# Patient Record
Sex: FEMALE | Race: WHITE | Employment: UNEMPLOYED | URBAN - METROPOLITAN AREA
[De-identification: names, ages, dates, MRNs, and addresses within clinical notes are randomized per-mention and may not be internally consistent; named-entity substitution may affect disease eponyms.]

---

## 2018-03-28 ENCOUNTER — HOSPITAL ENCOUNTER (OUTPATIENT)
Dept: PREADMISSION TESTING | Age: 58
Discharge: HOME | DRG: 454 | End: 2018-03-28
Payer: MEDICARE

## 2018-03-28 ENCOUNTER — ANESTHESIA EVENT (OUTPATIENT)
Dept: SURGERY | Age: 58
DRG: 454 | End: 2018-03-28
Payer: MEDICARE

## 2018-03-28 LAB
ALBUMIN SERPL-MCNC: 3.9 G/DL (ref 3.4–5)
ALBUMIN/GLOB SERPL: 1.6 {RATIO} (ref 0.8–1.7)
ALP SERPL-CCNC: 129 U/L (ref 45–117)
ALT SERPL-CCNC: 20 U/L (ref 13–56)
ANION GAP SERPL CALC-SCNC: 4 MMOL/L (ref 3–18)
APPEARANCE UR: CLEAR
APTT PPP: 31.5 SEC (ref 23–36.4)
AST SERPL-CCNC: 14 U/L (ref 15–37)
BACTERIA URNS QL MICRO: NEGATIVE /HPF
BASOPHILS # BLD: 0 K/UL (ref 0–0.06)
BASOPHILS NFR BLD: 0 % (ref 0–2)
BILIRUB SERPL-MCNC: 0.2 MG/DL (ref 0.2–1)
BILIRUB UR QL: NEGATIVE
BUN SERPL-MCNC: 16 MG/DL (ref 7–18)
BUN/CREAT SERPL: 24 (ref 12–20)
CALCIUM SERPL-MCNC: 9.3 MG/DL (ref 8.5–10.1)
CAOX CRY URNS QL MICRO: ABNORMAL
CHLORIDE SERPL-SCNC: 104 MMOL/L (ref 100–108)
CO2 SERPL-SCNC: 32 MMOL/L (ref 21–32)
COLOR UR: ABNORMAL
CREAT SERPL-MCNC: 0.67 MG/DL (ref 0.6–1.3)
DIFFERENTIAL METHOD BLD: ABNORMAL
EOSINOPHIL # BLD: 0.4 K/UL (ref 0–0.4)
EOSINOPHIL NFR BLD: 7 % (ref 0–5)
EPITH CASTS URNS QL MICRO: ABNORMAL /LPF (ref 0–5)
ERYTHROCYTE [DISTWIDTH] IN BLOOD BY AUTOMATED COUNT: 16.2 % (ref 11.6–14.5)
GLOBULIN SER CALC-MCNC: 2.5 G/DL (ref 2–4)
GLUCOSE SERPL-MCNC: 78 MG/DL (ref 74–99)
GLUCOSE UR STRIP.AUTO-MCNC: NEGATIVE MG/DL
HCT VFR BLD AUTO: 35.4 % (ref 35–45)
HGB BLD-MCNC: 10.9 G/DL (ref 12–16)
HGB UR QL STRIP: NEGATIVE
INR PPP: 1 (ref 0.8–1.2)
KETONES UR QL STRIP.AUTO: ABNORMAL MG/DL
LEUKOCYTE ESTERASE UR QL STRIP.AUTO: ABNORMAL
LYMPHOCYTES # BLD: 1.7 K/UL (ref 0.9–3.6)
LYMPHOCYTES NFR BLD: 28 % (ref 21–52)
MCH RBC QN AUTO: 27.6 PG (ref 24–34)
MCHC RBC AUTO-ENTMCNC: 30.8 G/DL (ref 31–37)
MCV RBC AUTO: 89.6 FL (ref 74–97)
MONOCYTES # BLD: 0.4 K/UL (ref 0.05–1.2)
MONOCYTES NFR BLD: 8 % (ref 3–10)
NEUTS SEG # BLD: 3.3 K/UL (ref 1.8–8)
NEUTS SEG NFR BLD: 57 % (ref 40–73)
NITRITE UR QL STRIP.AUTO: NEGATIVE
PH UR STRIP: 5 [PH] (ref 5–8)
PLATELET # BLD AUTO: 327 K/UL (ref 135–420)
PMV BLD AUTO: 9 FL (ref 9.2–11.8)
POTASSIUM SERPL-SCNC: 3.9 MMOL/L (ref 3.5–5.5)
PROT SERPL-MCNC: 6.4 G/DL (ref 6.4–8.2)
PROT UR STRIP-MCNC: NEGATIVE MG/DL
PROTHROMBIN TIME: 12.7 SEC (ref 11.5–15.2)
RBC # BLD AUTO: 3.95 M/UL (ref 4.2–5.3)
RBC #/AREA URNS HPF: ABNORMAL /HPF (ref 0–5)
SODIUM SERPL-SCNC: 140 MMOL/L (ref 136–145)
SP GR UR REFRACTOMETRY: >1.03 (ref 1–1.03)
UROBILINOGEN UR QL STRIP.AUTO: 1 EU/DL (ref 0.2–1)
WBC # BLD AUTO: 5.8 K/UL (ref 4.6–13.2)
WBC URNS QL MICRO: ABNORMAL /HPF (ref 0–4)

## 2018-03-28 PROCEDURE — 85730 THROMBOPLASTIN TIME PARTIAL: CPT | Performed by: NEUROLOGICAL SURGERY

## 2018-03-28 PROCEDURE — 87086 URINE CULTURE/COLONY COUNT: CPT | Performed by: NEUROLOGICAL SURGERY

## 2018-03-28 PROCEDURE — 87077 CULTURE AEROBIC IDENTIFY: CPT | Performed by: NEUROLOGICAL SURGERY

## 2018-03-28 PROCEDURE — 86901 BLOOD TYPING SEROLOGIC RH(D): CPT | Performed by: NEUROLOGICAL SURGERY

## 2018-03-28 PROCEDURE — 81001 URINALYSIS AUTO W/SCOPE: CPT | Performed by: NEUROLOGICAL SURGERY

## 2018-03-28 PROCEDURE — 85025 COMPLETE CBC W/AUTO DIFF WBC: CPT | Performed by: NEUROLOGICAL SURGERY

## 2018-03-28 PROCEDURE — 87186 SC STD MICRODIL/AGAR DIL: CPT | Performed by: NEUROLOGICAL SURGERY

## 2018-03-28 PROCEDURE — 80053 COMPREHEN METABOLIC PANEL: CPT | Performed by: NEUROLOGICAL SURGERY

## 2018-03-28 PROCEDURE — 86920 COMPATIBILITY TEST SPIN: CPT | Performed by: NEUROLOGICAL SURGERY

## 2018-03-28 PROCEDURE — 85610 PROTHROMBIN TIME: CPT | Performed by: NEUROLOGICAL SURGERY

## 2018-03-28 PROCEDURE — 36415 COLL VENOUS BLD VENIPUNCTURE: CPT | Performed by: NEUROLOGICAL SURGERY

## 2018-03-28 NOTE — PERIOP NOTES
PAT - SURGICAL PRE-ADMISSION INSTRUCTIONS    NAME:  Tramaine Lewis                                                          TODAY'S DATE:  3/28/2018    SURGERY DATE:  3/29/2018                                  SURGERY ARRIVAL TIME:   0630    1. Do NOT eat or drink anything, including candy or gum, after MIDNIGHT on 03/28/18 , unless you have specific instructions from your Surgeon or Anesthesia Provider to do so. 2. No smoking on the day of surgery. 3. No alcohol 24 hours prior to the day of surgery. 4. No recreational drugs for one week prior to the day of surgery. 5. Leave all valuables, including money/purse, at home. 6. Remove all jewelry, nail polish, makeup (including mascara); no lotions, powders, deodorant, or perfume/cologne/after shave. 7. Glasses/Contact lenses and Dentures may be worn to the hospital.  They will be removed prior to surgery. 8. Call your doctor if symptoms of a cold or illness develop within 24 ours prior to surgery. 9. AN ADULT MUST DRIVE YOU HOME AFTER OUTPATIENT SURGERY. 10. If you are having an OUTPATIENT procedure, please make arrangements for a responsible adult to be with you for 24 hours after your surgery. 11. If you are admitted to the hospital, you will be assigned to a bed after surgery is complete. Normally a family member will not be able to see you until you are in your assigned bed. 15. Family is encouraged to accompany you to the hospital.  We ask visitors in the treatment area to be limited to ONE person at a time to ensure patient privacy. EXCEPTIONS WILL BE MADE AS NEEDED. 15. Children under 12 are discouraged from entering the treatment area and need to be supervised by an adult when in the waiting room. Special Instructions: Take these medications the morning of surgery with a sip of water:  Toprol , Cymbalta, Oxycodone    Patient Prep:    use CHG solution    These surgical instructions were reviewed with Demi during the PAT visit.    A printed copy of the instructions was provided to [facility]. Directions: On the morning of surgery, please go to the [address] [facility]. Enter the building from the [facility] entrance, 1st floor (next to the Emergency Room entrance). Take the elevator to the 2nd floor. Sign in at the Registration Desk.     If you have any questions and/or concerns, please do not hesitate to call:  (Prior to the day of surgery)  [facility] unit:  [phone]  (Day of surgery)  [facility] unit:  [phone]

## 2018-03-29 ENCOUNTER — APPOINTMENT (OUTPATIENT)
Dept: GENERAL RADIOLOGY | Age: 58
DRG: 454 | End: 2018-03-29
Attending: NEUROLOGICAL SURGERY
Payer: MEDICARE

## 2018-03-29 ENCOUNTER — HOSPITAL ENCOUNTER (INPATIENT)
Age: 58
LOS: 12 days | Discharge: HOME HEALTH | DRG: 454 | End: 2018-04-10
Attending: NEUROLOGICAL SURGERY | Admitting: NEUROLOGICAL SURGERY
Payer: MEDICARE

## 2018-03-29 ENCOUNTER — ANESTHESIA (OUTPATIENT)
Dept: SURGERY | Age: 58
DRG: 454 | End: 2018-03-29
Payer: MEDICARE

## 2018-03-29 DIAGNOSIS — G89.29 CHRONIC MIDLINE LOW BACK PAIN WITH SCIATICA, SCIATICA LATERALITY UNSPECIFIED: ICD-10-CM

## 2018-03-29 DIAGNOSIS — M54.42 CHRONIC MIDLINE LOW BACK PAIN WITH BILATERAL SCIATICA: Primary | ICD-10-CM

## 2018-03-29 DIAGNOSIS — M51.37 DEGENERATION OF LUMBAR OR LUMBOSACRAL INTERVERTEBRAL DISC: ICD-10-CM

## 2018-03-29 DIAGNOSIS — M54.40 CHRONIC MIDLINE LOW BACK PAIN WITH SCIATICA, SCIATICA LATERALITY UNSPECIFIED: ICD-10-CM

## 2018-03-29 DIAGNOSIS — M54.41 CHRONIC MIDLINE LOW BACK PAIN WITH BILATERAL SCIATICA: Primary | ICD-10-CM

## 2018-03-29 DIAGNOSIS — G89.29 CHRONIC MIDLINE LOW BACK PAIN WITH BILATERAL SCIATICA: Primary | ICD-10-CM

## 2018-03-29 PROBLEM — M54.16 LUMBAR RADICULOPATHY: Status: ACTIVE | Noted: 2018-03-29

## 2018-03-29 PROBLEM — M54.14 RADICULOPATHY, THORACIC REGION: Status: ACTIVE | Noted: 2018-03-29

## 2018-03-29 PROBLEM — M40.30: Status: ACTIVE | Noted: 2018-03-29

## 2018-03-29 LAB
HCT VFR BLD AUTO: 25.8 % (ref 35–45)
HGB BLD-MCNC: 8.4 G/DL (ref 12–16)

## 2018-03-29 PROCEDURE — 77030004391 HC BUR FLUT MEDT -C: Performed by: NEUROLOGICAL SURGERY

## 2018-03-29 PROCEDURE — 74011250636 HC RX REV CODE- 250/636: Performed by: ANESTHESIOLOGY

## 2018-03-29 PROCEDURE — 0SH304Z INSERTION OF INTERNAL FIXATION DEVICE INTO LUMBOSACRAL JOINT, OPEN APPROACH: ICD-10-PCS | Performed by: NEUROLOGICAL SURGERY

## 2018-03-29 PROCEDURE — C1713 ANCHOR/SCREW BN/BN,TIS/BN: HCPCS | Performed by: NEUROLOGICAL SURGERY

## 2018-03-29 PROCEDURE — 77030035236 HC SUT PDS STRATFX BARB J&J -B: Performed by: NEUROLOGICAL SURGERY

## 2018-03-29 PROCEDURE — 77030003028 HC SUT VCRL J&J -A: Performed by: NEUROLOGICAL SURGERY

## 2018-03-29 PROCEDURE — 0SP004Z REMOVAL OF INTERNAL FIXATION DEVICE FROM LUMBAR VERTEBRAL JOINT, OPEN APPROACH: ICD-10-PCS | Performed by: NEUROLOGICAL SURGERY

## 2018-03-29 PROCEDURE — 72100 X-RAY EXAM L-S SPINE 2/3 VWS: CPT

## 2018-03-29 PROCEDURE — 77030031359 HC BLD BN MILL DISP STRY -E: Performed by: NEUROLOGICAL SURGERY

## 2018-03-29 PROCEDURE — 77030034694 HC SCPL CANADY PLSM DISP USMD -E: Performed by: NEUROLOGICAL SURGERY

## 2018-03-29 PROCEDURE — 74011250636 HC RX REV CODE- 250/636: Performed by: NURSE ANESTHETIST, CERTIFIED REGISTERED

## 2018-03-29 PROCEDURE — 77030003029 HC SUT VCRL J&J -B: Performed by: NEUROLOGICAL SURGERY

## 2018-03-29 PROCEDURE — 77030018719 HC DRSG PTCH ANTIMIC J&J -A: Performed by: NEUROLOGICAL SURGERY

## 2018-03-29 PROCEDURE — 77030002946 HC SUT NRLN J&J -B: Performed by: NEUROLOGICAL SURGERY

## 2018-03-29 PROCEDURE — 74011000272 HC RX REV CODE- 272: Performed by: NEUROLOGICAL SURGERY

## 2018-03-29 PROCEDURE — 77030018836 HC SOL IRR NACL ICUM -A: Performed by: NEUROLOGICAL SURGERY

## 2018-03-29 PROCEDURE — 0RPA04Z REMOVAL OF INTERNAL FIXATION DEVICE FROM THORACOLUMBAR VERTEBRAL JOINT, OPEN APPROACH: ICD-10-PCS | Performed by: NEUROLOGICAL SURGERY

## 2018-03-29 PROCEDURE — 74011250637 HC RX REV CODE- 250/637: Performed by: NEUROLOGICAL SURGERY

## 2018-03-29 PROCEDURE — 77030012406 HC DRN WND PENRS BARD -A: Performed by: NEUROLOGICAL SURGERY

## 2018-03-29 PROCEDURE — 77030011640 HC PAD GRND REM COVD -A: Performed by: NEUROLOGICAL SURGERY

## 2018-03-29 PROCEDURE — 77030002987 HC SUT PROL J&J -B: Performed by: NEUROLOGICAL SURGERY

## 2018-03-29 PROCEDURE — 0SG00KJ FUSION OF LUMBAR VERTEBRAL JOINT WITH NONAUTOLOGOUS TISSUE SUBSTITUTE, POSTERIOR APPROACH, ANTERIOR COLUMN, OPEN APPROACH: ICD-10-PCS | Performed by: NEUROLOGICAL SURGERY

## 2018-03-29 PROCEDURE — 0RH604Z INSERTION OF INTERNAL FIXATION DEVICE INTO THORACIC VERTEBRAL JOINT, OPEN APPROACH: ICD-10-PCS | Performed by: NEUROLOGICAL SURGERY

## 2018-03-29 PROCEDURE — P9016 RBC LEUKOCYTES REDUCED: HCPCS | Performed by: NEUROLOGICAL SURGERY

## 2018-03-29 PROCEDURE — 74011000250 HC RX REV CODE- 250: Performed by: NEUROLOGICAL SURGERY

## 2018-03-29 PROCEDURE — 77030029372 HC ADH SKN CLSR PRINEO J&J -C: Performed by: NEUROLOGICAL SURGERY

## 2018-03-29 PROCEDURE — 0SH004Z INSERTION OF INTERNAL FIXATION DEVICE INTO LUMBAR VERTEBRAL JOINT, OPEN APPROACH: ICD-10-PCS | Performed by: NEUROLOGICAL SURGERY

## 2018-03-29 PROCEDURE — 74011000250 HC RX REV CODE- 250

## 2018-03-29 PROCEDURE — 77030018846 HC SOL IRR STRL H20 ICUM -A: Performed by: NEUROLOGICAL SURGERY

## 2018-03-29 PROCEDURE — 0QH304Z INSERTION OF INTERNAL FIXATION DEVICE INTO LEFT PELVIC BONE, OPEN APPROACH: ICD-10-PCS | Performed by: NEUROLOGICAL SURGERY

## 2018-03-29 PROCEDURE — 01NB0ZZ RELEASE LUMBAR NERVE, OPEN APPROACH: ICD-10-PCS | Performed by: NEUROLOGICAL SURGERY

## 2018-03-29 PROCEDURE — 74011250636 HC RX REV CODE- 250/636

## 2018-03-29 PROCEDURE — 76060000046 HC ANESTHESIA 7.5 TO 8 HR: Performed by: NEUROLOGICAL SURGERY

## 2018-03-29 PROCEDURE — 77030003160 HC GRFT DURA COLGN MEDT -E: Performed by: NEUROLOGICAL SURGERY

## 2018-03-29 PROCEDURE — 77030005518 HC CATH URETH FOL 2W BARD -B: Performed by: NEUROLOGICAL SURGERY

## 2018-03-29 PROCEDURE — 77030033138 HC SUT PGA STRATFX J&J -B: Performed by: NEUROLOGICAL SURGERY

## 2018-03-29 PROCEDURE — 85018 HEMOGLOBIN: CPT | Performed by: NEUROLOGICAL SURGERY

## 2018-03-29 PROCEDURE — P9047 ALBUMIN (HUMAN), 25%, 50ML: HCPCS

## 2018-03-29 PROCEDURE — 00UT0KZ SUPPLEMENT SPINAL MENINGES WITH NONAUTOLOGOUS TISSUE SUBSTITUTE, OPEN APPROACH: ICD-10-PCS | Performed by: NEUROLOGICAL SURGERY

## 2018-03-29 PROCEDURE — 0QH204Z INSERTION OF INTERNAL FIXATION DEVICE INTO RIGHT PELVIC BONE, OPEN APPROACH: ICD-10-PCS | Performed by: NEUROLOGICAL SURGERY

## 2018-03-29 PROCEDURE — 0SG10K1 FUSION OF 2 OR MORE LUMBAR VERTEBRAL JOINTS WITH NONAUTOLOGOUS TISSUE SUBSTITUTE, POSTERIOR APPROACH, POSTERIOR COLUMN, OPEN APPROACH: ICD-10-PCS | Performed by: NEUROLOGICAL SURGERY

## 2018-03-29 PROCEDURE — 76010000182 HC OR TIME 7.5 TO 8 HR INTENSV-TIER 1: Performed by: NEUROLOGICAL SURGERY

## 2018-03-29 PROCEDURE — 77030032490 HC SLV COMPR SCD KNE COVD -B: Performed by: NEUROLOGICAL SURGERY

## 2018-03-29 PROCEDURE — 77030020271 HC MISC IMPL NEURO: Performed by: NEUROLOGICAL SURGERY

## 2018-03-29 PROCEDURE — 77030020256 HC SOL INJ NACL 0.9%  500ML

## 2018-03-29 PROCEDURE — 0RHA04Z INSERTION OF INTERNAL FIXATION DEVICE INTO THORACOLUMBAR VERTEBRAL JOINT, OPEN APPROACH: ICD-10-PCS | Performed by: NEUROLOGICAL SURGERY

## 2018-03-29 PROCEDURE — 74011000258 HC RX REV CODE- 258

## 2018-03-29 PROCEDURE — 0RP604Z REMOVAL OF INTERNAL FIXATION DEVICE FROM THORACIC VERTEBRAL JOINT, OPEN APPROACH: ICD-10-PCS | Performed by: NEUROLOGICAL SURGERY

## 2018-03-29 PROCEDURE — 77030037731 HC PTTY BN HEMOSRB ABRY -H: Performed by: NEUROLOGICAL SURGERY

## 2018-03-29 PROCEDURE — 76210000002 HC OR PH I REC 3 TO 3.5 HR: Performed by: NEUROLOGICAL SURGERY

## 2018-03-29 PROCEDURE — 77030012602 HC SPNG PTTY NEUR J&J -B: Performed by: NEUROLOGICAL SURGERY

## 2018-03-29 PROCEDURE — 77030014650 HC SEAL MTRX FLOSEL BAXT -C: Performed by: NEUROLOGICAL SURGERY

## 2018-03-29 PROCEDURE — 74011250637 HC RX REV CODE- 250/637: Performed by: NURSE ANESTHETIST, CERTIFIED REGISTERED

## 2018-03-29 PROCEDURE — 77030029716 HC SEAL SPN HEMSTAT DURASL KT INLC -F: Performed by: NEUROLOGICAL SURGERY

## 2018-03-29 PROCEDURE — 77030018723 HC ELCTRD BLD COVD -A: Performed by: NEUROLOGICAL SURGERY

## 2018-03-29 PROCEDURE — 36415 COLL VENOUS BLD VENIPUNCTURE: CPT | Performed by: NEUROLOGICAL SURGERY

## 2018-03-29 PROCEDURE — 65610000006 HC RM INTENSIVE CARE

## 2018-03-29 PROCEDURE — 77030018673: Performed by: NEUROLOGICAL SURGERY

## 2018-03-29 PROCEDURE — 77010033678 HC OXYGEN DAILY

## 2018-03-29 PROCEDURE — 74011250636 HC RX REV CODE- 250/636: Performed by: NEUROLOGICAL SURGERY

## 2018-03-29 PROCEDURE — 77030029099 HC BN WAX SSPC -A: Performed by: NEUROLOGICAL SURGERY

## 2018-03-29 PROCEDURE — 77030033263 HC DRSG MEPILEX 16-48IN BORD MOLN -B

## 2018-03-29 DEVICE — CREO® THREADED 7.5 X 45MM POLYAXIAL SCREW
Type: IMPLANTABLE DEVICE | Site: SPINE LUMBAR | Status: FUNCTIONAL
Brand: CREO

## 2018-03-29 DEVICE — THREADED LOCKING CAP, CREO
Type: IMPLANTABLE DEVICE | Site: SPINE LUMBAR | Status: FUNCTIONAL
Brand: CREO

## 2018-03-29 DEVICE — SCREW SPNL DIA5.5MM OPN TULIP LOK RELINE: Type: IMPLANTABLE DEVICE | Site: SPINE LUMBAR | Status: FUNCTIONAL

## 2018-03-29 DEVICE — SCREW SPNL POST THORACOLUMBOSACRAL LCK CLOSE TULIP RELINE: Type: IMPLANTABLE DEVICE | Site: SPINE LUMBAR | Status: FUNCTIONAL

## 2018-03-29 DEVICE — IMPLANTABLE DEVICE: Type: IMPLANTABLE DEVICE | Site: SPINE LUMBAR | Status: FUNCTIONAL

## 2018-03-29 DEVICE — 5.5MM CURVED ROD, TITANIUM ALLOY, 55MM LENGTH
Type: IMPLANTABLE DEVICE | Site: SPINE LUMBAR | Status: FUNCTIONAL
Brand: CREO

## 2018-03-29 DEVICE — CREO® THREADED 6.5 X 40MM POLYAXIAL SCREW
Type: IMPLANTABLE DEVICE | Site: SPINE LUMBAR | Status: FUNCTIONAL
Brand: CREO

## 2018-03-29 DEVICE — 5.5MM CURVED ROD, TITANIUM ALLOY, 85MM LENGTH
Type: IMPLANTABLE DEVICE | Site: SPINE LUMBAR | Status: FUNCTIONAL
Brand: CREO

## 2018-03-29 DEVICE — CREO® THREADED 6.5 X 50MM POLYAXIAL SCREW
Type: IMPLANTABLE DEVICE | Site: SPINE LUMBAR | Status: FUNCTIONAL
Brand: CREO

## 2018-03-29 DEVICE — GRAFT BONE SUB 5CC CA PHOS VIT E ACETT HEMSTAT COHESIVE: Type: IMPLANTABLE DEVICE | Site: SPINE LUMBAR | Status: FUNCTIONAL

## 2018-03-29 DEVICE — CREO® THREADED 5.5 X 45MM POLYAXIAL SCREW
Type: IMPLANTABLE DEVICE | Site: SPINE LUMBAR | Status: FUNCTIONAL
Brand: CREO

## 2018-03-29 DEVICE — 5.5MM CURVED ROD, TITANIUM ALLOY, 75MM LENGTH
Type: IMPLANTABLE DEVICE | Site: SPINE LUMBAR | Status: FUNCTIONAL
Brand: CREO

## 2018-03-29 DEVICE — CREO® THREADED 6.5 X 45MM POLYAXIAL SCREW
Type: IMPLANTABLE DEVICE | Site: SPINE LUMBAR | Status: FUNCTIONAL
Brand: CREO

## 2018-03-29 DEVICE — DURA 62100 SUBSTITUTE DUREPAIR 2X2IN NCE
Type: IMPLANTABLE DEVICE | Site: SPINE LUMBAR | Status: FUNCTIONAL
Brand: DUREPAIR®

## 2018-03-29 RX ORDER — LORAZEPAM 2 MG/ML
1 INJECTION INTRAMUSCULAR
Status: COMPLETED | OUTPATIENT
Start: 2018-03-29 | End: 2018-03-29

## 2018-03-29 RX ORDER — FAMOTIDINE 20 MG/1
20 TABLET, FILM COATED ORAL ONCE
Status: COMPLETED | OUTPATIENT
Start: 2018-03-29 | End: 2018-03-29

## 2018-03-29 RX ORDER — CYCLOBENZAPRINE HCL 10 MG
10 TABLET ORAL
Status: DISCONTINUED | OUTPATIENT
Start: 2018-03-29 | End: 2018-03-29 | Stop reason: SDUPTHER

## 2018-03-29 RX ORDER — ADHESIVE BANDAGE
30 BANDAGE TOPICAL DAILY
Status: DISCONTINUED | OUTPATIENT
Start: 2018-03-30 | End: 2018-04-10 | Stop reason: HOSPADM

## 2018-03-29 RX ORDER — MORPHINE SULFATE 10 MG/ML
INJECTION, SOLUTION INTRAMUSCULAR; INTRAVENOUS AS NEEDED
Status: DISCONTINUED | OUTPATIENT
Start: 2018-03-29 | End: 2018-03-29 | Stop reason: HOSPADM

## 2018-03-29 RX ORDER — SODIUM CHLORIDE 0.9 % (FLUSH) 0.9 %
5-10 SYRINGE (ML) INJECTION AS NEEDED
Status: DISCONTINUED | OUTPATIENT
Start: 2018-03-29 | End: 2018-04-10 | Stop reason: HOSPADM

## 2018-03-29 RX ORDER — LORAZEPAM 2 MG/ML
INJECTION INTRAMUSCULAR
Status: COMPLETED
Start: 2018-03-29 | End: 2018-03-29

## 2018-03-29 RX ORDER — OXYCODONE HYDROCHLORIDE 5 MG/1
20 TABLET ORAL
Status: CANCELLED | OUTPATIENT
Start: 2018-03-29

## 2018-03-29 RX ORDER — MORPHINE SULFATE 4 MG/ML
INJECTION, SOLUTION INTRAMUSCULAR; INTRAVENOUS
Status: COMPLETED
Start: 2018-03-29 | End: 2018-03-29

## 2018-03-29 RX ORDER — CYCLOBENZAPRINE HCL 10 MG
10 TABLET ORAL
Status: DISCONTINUED | OUTPATIENT
Start: 2018-03-29 | End: 2018-04-10 | Stop reason: HOSPADM

## 2018-03-29 RX ORDER — MORPHINE SULFATE 2 MG/ML
2 INJECTION, SOLUTION INTRAMUSCULAR; INTRAVENOUS
Status: DISCONTINUED | OUTPATIENT
Start: 2018-03-29 | End: 2018-03-29

## 2018-03-29 RX ORDER — ONDANSETRON 2 MG/ML
4 INJECTION INTRAMUSCULAR; INTRAVENOUS ONCE
Status: DISCONTINUED | OUTPATIENT
Start: 2018-03-29 | End: 2018-03-29 | Stop reason: HOSPADM

## 2018-03-29 RX ORDER — METOPROLOL SUCCINATE 25 MG/1
25 TABLET, EXTENDED RELEASE ORAL DAILY
Status: DISCONTINUED | OUTPATIENT
Start: 2018-03-30 | End: 2018-03-31

## 2018-03-29 RX ORDER — MORPHINE SULFATE 4 MG/ML
2 INJECTION, SOLUTION INTRAMUSCULAR; INTRAVENOUS
Status: DISCONTINUED | OUTPATIENT
Start: 2018-03-29 | End: 2018-03-29 | Stop reason: HOSPADM

## 2018-03-29 RX ORDER — VANCOMYCIN HYDROCHLORIDE 1 G/20ML
INJECTION, POWDER, LYOPHILIZED, FOR SOLUTION INTRAVENOUS AS NEEDED
Status: DISCONTINUED | OUTPATIENT
Start: 2018-03-29 | End: 2018-03-29 | Stop reason: HOSPADM

## 2018-03-29 RX ORDER — CLINDAMYCIN PHOSPHATE 900 MG/50ML
900 INJECTION INTRAVENOUS EVERY 8 HOURS
Status: COMPLETED | OUTPATIENT
Start: 2018-03-29 | End: 2018-04-06

## 2018-03-29 RX ORDER — DEXAMETHASONE SODIUM PHOSPHATE 4 MG/ML
INJECTION, SOLUTION INTRA-ARTICULAR; INTRALESIONAL; INTRAMUSCULAR; INTRAVENOUS; SOFT TISSUE
Status: COMPLETED
Start: 2018-03-29 | End: 2018-03-29

## 2018-03-29 RX ORDER — SODIUM CHLORIDE 0.9 % (FLUSH) 0.9 %
5-10 SYRINGE (ML) INJECTION EVERY 8 HOURS
Status: DISCONTINUED | OUTPATIENT
Start: 2018-03-29 | End: 2018-04-10 | Stop reason: HOSPADM

## 2018-03-29 RX ORDER — MORPHINE SULFATE 10 MG/ML
5 INJECTION, SOLUTION INTRAMUSCULAR; INTRAVENOUS
Status: CANCELLED | OUTPATIENT
Start: 2018-03-29

## 2018-03-29 RX ORDER — ONDANSETRON 2 MG/ML
4 INJECTION INTRAMUSCULAR; INTRAVENOUS
Status: DISCONTINUED | OUTPATIENT
Start: 2018-03-29 | End: 2018-04-10 | Stop reason: HOSPADM

## 2018-03-29 RX ORDER — SUCCINYLCHOLINE CHLORIDE 20 MG/ML
INJECTION INTRAMUSCULAR; INTRAVENOUS AS NEEDED
Status: DISCONTINUED | OUTPATIENT
Start: 2018-03-29 | End: 2018-03-29 | Stop reason: HOSPADM

## 2018-03-29 RX ORDER — FENTANYL CITRATE 50 UG/ML
INJECTION, SOLUTION INTRAMUSCULAR; INTRAVENOUS AS NEEDED
Status: DISCONTINUED | OUTPATIENT
Start: 2018-03-29 | End: 2018-03-29 | Stop reason: HOSPADM

## 2018-03-29 RX ORDER — PROPOFOL 10 MG/ML
VIAL (ML) INTRAVENOUS
Status: DISCONTINUED | OUTPATIENT
Start: 2018-03-29 | End: 2018-03-29 | Stop reason: HOSPADM

## 2018-03-29 RX ORDER — CEFAZOLIN SODIUM 2 G/50ML
SOLUTION INTRAVENOUS
Status: DISCONTINUED
Start: 2018-03-29 | End: 2018-03-29

## 2018-03-29 RX ORDER — CLINDAMYCIN PHOSPHATE 900 MG/50ML
900 INJECTION INTRAVENOUS ONCE
Status: COMPLETED | OUTPATIENT
Start: 2018-03-29 | End: 2018-03-29

## 2018-03-29 RX ORDER — FENTANYL CITRATE 50 UG/ML
50 INJECTION, SOLUTION INTRAMUSCULAR; INTRAVENOUS AS NEEDED
Status: COMPLETED | OUTPATIENT
Start: 2018-03-29 | End: 2018-03-29

## 2018-03-29 RX ORDER — DEXAMETHASONE SODIUM PHOSPHATE 4 MG/ML
4 INJECTION, SOLUTION INTRA-ARTICULAR; INTRALESIONAL; INTRAMUSCULAR; INTRAVENOUS; SOFT TISSUE EVERY 8 HOURS
Status: COMPLETED | OUTPATIENT
Start: 2018-03-29 | End: 2018-03-30

## 2018-03-29 RX ORDER — ACETAMINOPHEN 325 MG/1
650 TABLET ORAL
Status: DISCONTINUED | OUTPATIENT
Start: 2018-03-29 | End: 2018-04-10 | Stop reason: HOSPADM

## 2018-03-29 RX ORDER — MIDAZOLAM HYDROCHLORIDE 1 MG/ML
INJECTION, SOLUTION INTRAMUSCULAR; INTRAVENOUS AS NEEDED
Status: DISCONTINUED | OUTPATIENT
Start: 2018-03-29 | End: 2018-03-29 | Stop reason: HOSPADM

## 2018-03-29 RX ORDER — SODIUM CHLORIDE 9 MG/ML
INJECTION, SOLUTION INTRAVENOUS
Status: DISCONTINUED | OUTPATIENT
Start: 2018-03-29 | End: 2018-03-29 | Stop reason: HOSPADM

## 2018-03-29 RX ORDER — SODIUM CHLORIDE, SODIUM LACTATE, POTASSIUM CHLORIDE, CALCIUM CHLORIDE 600; 310; 30; 20 MG/100ML; MG/100ML; MG/100ML; MG/100ML
25 INJECTION, SOLUTION INTRAVENOUS CONTINUOUS
Status: DISCONTINUED | OUTPATIENT
Start: 2018-03-29 | End: 2018-03-29 | Stop reason: HOSPADM

## 2018-03-29 RX ORDER — DEXAMETHASONE SODIUM PHOSPHATE 4 MG/ML
INJECTION, SOLUTION INTRA-ARTICULAR; INTRALESIONAL; INTRAMUSCULAR; INTRAVENOUS; SOFT TISSUE AS NEEDED
Status: DISCONTINUED | OUTPATIENT
Start: 2018-03-29 | End: 2018-03-29 | Stop reason: HOSPADM

## 2018-03-29 RX ORDER — CLINDAMYCIN PHOSPHATE 900 MG/50ML
INJECTION INTRAVENOUS
Status: COMPLETED
Start: 2018-03-29 | End: 2018-03-29

## 2018-03-29 RX ORDER — SODIUM CHLORIDE, SODIUM LACTATE, POTASSIUM CHLORIDE, CALCIUM CHLORIDE 600; 310; 30; 20 MG/100ML; MG/100ML; MG/100ML; MG/100ML
INJECTION, SOLUTION INTRAVENOUS
Status: DISCONTINUED | OUTPATIENT
Start: 2018-03-29 | End: 2018-03-29 | Stop reason: HOSPADM

## 2018-03-29 RX ORDER — PROPOFOL 10 MG/ML
INJECTION, EMULSION INTRAVENOUS AS NEEDED
Status: DISCONTINUED | OUTPATIENT
Start: 2018-03-29 | End: 2018-03-29 | Stop reason: HOSPADM

## 2018-03-29 RX ORDER — SODIUM CHLORIDE 0.9 % (FLUSH) 0.9 %
5-10 SYRINGE (ML) INJECTION AS NEEDED
Status: DISCONTINUED | OUTPATIENT
Start: 2018-03-29 | End: 2018-03-29 | Stop reason: HOSPADM

## 2018-03-29 RX ORDER — DULOXETIN HYDROCHLORIDE 60 MG/1
60 CAPSULE, DELAYED RELEASE ORAL 2 TIMES DAILY
Status: DISCONTINUED | OUTPATIENT
Start: 2018-03-29 | End: 2018-04-10 | Stop reason: HOSPADM

## 2018-03-29 RX ORDER — ALBUMIN HUMAN 250 G/1000ML
SOLUTION INTRAVENOUS AS NEEDED
Status: DISCONTINUED | OUTPATIENT
Start: 2018-03-29 | End: 2018-03-29 | Stop reason: HOSPADM

## 2018-03-29 RX ORDER — LIDOCAINE HYDROCHLORIDE 10 MG/ML
0.1 INJECTION INFILTRATION; PERINEURAL AS NEEDED
Status: DISCONTINUED | OUTPATIENT
Start: 2018-03-29 | End: 2018-03-29 | Stop reason: HOSPADM

## 2018-03-29 RX ORDER — LIDOCAINE HYDROCHLORIDE 20 MG/ML
INJECTION, SOLUTION EPIDURAL; INFILTRATION; INTRACAUDAL; PERINEURAL AS NEEDED
Status: DISCONTINUED | OUTPATIENT
Start: 2018-03-29 | End: 2018-03-29 | Stop reason: HOSPADM

## 2018-03-29 RX ORDER — SODIUM CHLORIDE 9 MG/ML
250 INJECTION, SOLUTION INTRAVENOUS AS NEEDED
Status: DISCONTINUED | OUTPATIENT
Start: 2018-03-29 | End: 2018-04-10 | Stop reason: HOSPADM

## 2018-03-29 RX ORDER — REMIFENTANIL HYDROCHLORIDE 1 MG/ML
INJECTION, POWDER, LYOPHILIZED, FOR SOLUTION INTRAVENOUS
Status: DISCONTINUED | OUTPATIENT
Start: 2018-03-29 | End: 2018-03-29 | Stop reason: HOSPADM

## 2018-03-29 RX ORDER — ONDANSETRON 2 MG/ML
INJECTION INTRAMUSCULAR; INTRAVENOUS AS NEEDED
Status: DISCONTINUED | OUTPATIENT
Start: 2018-03-29 | End: 2018-03-29 | Stop reason: HOSPADM

## 2018-03-29 RX ORDER — SODIUM CHLORIDE, SODIUM LACTATE, POTASSIUM CHLORIDE, CALCIUM CHLORIDE 600; 310; 30; 20 MG/100ML; MG/100ML; MG/100ML; MG/100ML
75 INJECTION, SOLUTION INTRAVENOUS CONTINUOUS
Status: DISPENSED | OUTPATIENT
Start: 2018-03-29 | End: 2018-03-29

## 2018-03-29 RX ORDER — NALOXONE HYDROCHLORIDE 0.4 MG/ML
0.4 INJECTION, SOLUTION INTRAMUSCULAR; INTRAVENOUS; SUBCUTANEOUS AS NEEDED
Status: DISCONTINUED | OUTPATIENT
Start: 2018-03-29 | End: 2018-04-10 | Stop reason: HOSPADM

## 2018-03-29 RX ADMIN — CLINDAMYCIN PHOSPHATE 900 MG: 18 INJECTION, SOLUTION INTRAMUSCULAR; INTRAVENOUS at 22:10

## 2018-03-29 RX ADMIN — Medication 5 MCG/KG/MIN: at 09:25

## 2018-03-29 RX ADMIN — LORAZEPAM 1 MG: 2 INJECTION INTRAMUSCULAR; INTRAVENOUS at 17:20

## 2018-03-29 RX ADMIN — HYDROMORPHONE HYDROCHLORIDE: 10 INJECTION, SOLUTION INTRAMUSCULAR; INTRAVENOUS; SUBCUTANEOUS at 17:34

## 2018-03-29 RX ADMIN — PROPOFOL 150 MG: 10 INJECTION, EMULSION INTRAVENOUS at 08:46

## 2018-03-29 RX ADMIN — DEXAMETHASONE SODIUM PHOSPHATE 4 MG: 4 INJECTION, SOLUTION INTRAMUSCULAR; INTRAVENOUS at 20:59

## 2018-03-29 RX ADMIN — FENTANYL CITRATE 25 MCG: 50 INJECTION, SOLUTION INTRAMUSCULAR; INTRAVENOUS at 15:57

## 2018-03-29 RX ADMIN — MORPHINE SULFATE 2 MG: 4 INJECTION, SOLUTION INTRAMUSCULAR; INTRAVENOUS at 17:23

## 2018-03-29 RX ADMIN — SODIUM CHLORIDE: 9 INJECTION, SOLUTION INTRAVENOUS at 15:09

## 2018-03-29 RX ADMIN — MORPHINE SULFATE 3 MG: 10 INJECTION, SOLUTION INTRAMUSCULAR; INTRAVENOUS at 12:49

## 2018-03-29 RX ADMIN — SODIUM CHLORIDE, SODIUM LACTATE, POTASSIUM CHLORIDE, AND CALCIUM CHLORIDE: 600; 310; 30; 20 INJECTION, SOLUTION INTRAVENOUS at 09:20

## 2018-03-29 RX ADMIN — ALBUMIN HUMAN 250 ML: 250 SOLUTION INTRAVENOUS at 11:21

## 2018-03-29 RX ADMIN — SODIUM CHLORIDE, SODIUM LACTATE, POTASSIUM CHLORIDE, AND CALCIUM CHLORIDE 25 ML/HR: 600; 310; 30; 20 INJECTION, SOLUTION INTRAVENOUS at 07:26

## 2018-03-29 RX ADMIN — FENTANYL CITRATE 50 MCG: 50 INJECTION, SOLUTION INTRAMUSCULAR; INTRAVENOUS at 16:47

## 2018-03-29 RX ADMIN — CLINDAMYCIN PHOSPHATE 900 MG: 900 INJECTION INTRAVENOUS at 08:42

## 2018-03-29 RX ADMIN — SODIUM CHLORIDE, SODIUM LACTATE, POTASSIUM CHLORIDE, CALCIUM CHLORIDE: 600; 310; 30; 20 INJECTION, SOLUTION INTRAVENOUS at 14:06

## 2018-03-29 RX ADMIN — MORPHINE SULFATE 2 MG: 4 INJECTION, SOLUTION INTRAMUSCULAR; INTRAVENOUS at 17:13

## 2018-03-29 RX ADMIN — SODIUM CHLORIDE, SODIUM LACTATE, POTASSIUM CHLORIDE, CALCIUM CHLORIDE: 600; 310; 30; 20 INJECTION, SOLUTION INTRAVENOUS at 11:12

## 2018-03-29 RX ADMIN — LORAZEPAM 1 MG: 2 INJECTION INTRAMUSCULAR; INTRAVENOUS at 17:35

## 2018-03-29 RX ADMIN — FENTANYL CITRATE 100 MCG: 50 INJECTION, SOLUTION INTRAMUSCULAR; INTRAVENOUS at 08:46

## 2018-03-29 RX ADMIN — ALBUMIN HUMAN 250 ML: 250 SOLUTION INTRAVENOUS at 13:54

## 2018-03-29 RX ADMIN — Medication 10 ML: at 21:05

## 2018-03-29 RX ADMIN — SUCCINYLCHOLINE CHLORIDE 100 MG: 20 INJECTION INTRAMUSCULAR; INTRAVENOUS at 08:46

## 2018-03-29 RX ADMIN — SODIUM CHLORIDE, SODIUM LACTATE, POTASSIUM CHLORIDE, CALCIUM CHLORIDE: 600; 310; 30; 20 INJECTION, SOLUTION INTRAVENOUS at 08:50

## 2018-03-29 RX ADMIN — MORPHINE SULFATE 4 MG: 10 INJECTION, SOLUTION INTRAMUSCULAR; INTRAVENOUS at 10:02

## 2018-03-29 RX ADMIN — MORPHINE SULFATE 2 MG: 4 INJECTION, SOLUTION INTRAMUSCULAR; INTRAVENOUS at 17:03

## 2018-03-29 RX ADMIN — DEXAMETHASONE SODIUM PHOSPHATE 10 MG: 4 INJECTION, SOLUTION INTRA-ARTICULAR; INTRALESIONAL; INTRAMUSCULAR; INTRAVENOUS; SOFT TISSUE at 08:48

## 2018-03-29 RX ADMIN — DEXAMETHASONE SODIUM PHOSPHATE 10 MG: 4 INJECTION, SOLUTION INTRA-ARTICULAR; INTRALESIONAL; INTRAMUSCULAR; INTRAVENOUS; SOFT TISSUE at 15:31

## 2018-03-29 RX ADMIN — MORPHINE SULFATE 2 MG: 4 INJECTION, SOLUTION INTRAMUSCULAR; INTRAVENOUS at 16:53

## 2018-03-29 RX ADMIN — Medication 10 ML: at 21:06

## 2018-03-29 RX ADMIN — MIDAZOLAM HYDROCHLORIDE 2 MG: 1 INJECTION, SOLUTION INTRAMUSCULAR; INTRAVENOUS at 08:42

## 2018-03-29 RX ADMIN — FENTANYL CITRATE 25 MCG: 50 INJECTION, SOLUTION INTRAMUSCULAR; INTRAVENOUS at 16:02

## 2018-03-29 RX ADMIN — FENTANYL CITRATE 50 MCG: 50 INJECTION, SOLUTION INTRAMUSCULAR; INTRAVENOUS at 16:42

## 2018-03-29 RX ADMIN — MORPHINE SULFATE 3 MG: 10 INJECTION, SOLUTION INTRAMUSCULAR; INTRAVENOUS at 09:39

## 2018-03-29 RX ADMIN — ONDANSETRON 4 MG: 2 INJECTION INTRAMUSCULAR; INTRAVENOUS at 15:31

## 2018-03-29 RX ADMIN — LIDOCAINE HYDROCHLORIDE 50 MG: 20 INJECTION, SOLUTION EPIDURAL; INFILTRATION; INTRACAUDAL; PERINEURAL at 08:46

## 2018-03-29 RX ADMIN — DULOXETINE HYDROCHLORIDE 60 MG: 60 CAPSULE, DELAYED RELEASE ORAL at 20:58

## 2018-03-29 RX ADMIN — SODIUM CHLORIDE: 9 INJECTION, SOLUTION INTRAVENOUS at 12:12

## 2018-03-29 RX ADMIN — CYCLOBENZAPRINE HYDROCHLORIDE 10 MG: 10 TABLET, FILM COATED ORAL at 20:58

## 2018-03-29 RX ADMIN — FAMOTIDINE 20 MG: 20 TABLET ORAL at 07:15

## 2018-03-29 RX ADMIN — SODIUM CHLORIDE, SODIUM LACTATE, POTASSIUM CHLORIDE, AND CALCIUM CHLORIDE: 600; 310; 30; 20 INJECTION, SOLUTION INTRAVENOUS at 14:26

## 2018-03-29 RX ADMIN — FENTANYL CITRATE 50 MCG: 50 INJECTION, SOLUTION INTRAMUSCULAR; INTRAVENOUS at 16:07

## 2018-03-29 RX ADMIN — REMIFENTANIL HYDROCHLORIDE 0.03 MCG/KG/MIN: 1 INJECTION, POWDER, LYOPHILIZED, FOR SOLUTION INTRAVENOUS at 09:25

## 2018-03-29 NOTE — BRIEF OP NOTE
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  Postoperative Diagnosis: thoracic lumbar radiculopathy   m54.16  m54.14  -flat back syndrome junctional failure  Procedure(s):  exposure of previous t10 - l3 fusion/ removal of hardware l3 pedicle subtraction osteotomy  instrumentation ilium tto t10, bilateral duralrepair,  Surgeon(s) and Role:     * Mena Heredia MD - Assisting     * Stevie Sunshine MD - Primary         Assistant Staff: None      Surgical Staff:  Circ-1: Alexia Dias RN  Circ-2: Ross Troo RN  Circ-Relief: Britni Guevara RN  Scrub Tech-1: Johanna Neely; Gabriel Pryor  Scrub Tech-Relief: Sheri Sullivan  Surg Asst-1: Pushpa Leone  Event Time In   Incision Start 0278   Incision Close      Anesthesia: General   Estimated Blood Loss: 1145  Specimens: * No specimens in log *   Findings: flatten spine   Complications: none  Implants:   Implant Name Type Inv.  Item Serial No.  Lot No. LRB No. Used Action   [employer] Graft Bone   [id number] [employer] N/A N/A 1 Implanted   [employer] Graft Bone   [id number] [employer] N/A N/A 1 Implanted   [employer] Graft Bone   [id number] [employer] N/A N/A 1 Implanted   [employer] Graft Bone   [id number]  N/A N/A 1 Implanted   PUTTY BNE HEMOSTAT RESORB 5CC -- MONTAGE - [id number]  PUTTY BNE HEMOSTAT RESORB 5CC -- MONTAGE [id number] [employer] [id number] N/A 1 Implanted   GRAFT DURA CLLGN DURPAIR 2X2IN --  - [id number]  GRAFT DURA CLLGN DURPAIR 2X2IN --   [employer] [id number] N/A 1 Implanted   PUTTY BNE HEMOSTAT RESORB 5CC -- MONTAGE - [id number]  PUTTY BNE HEMOSTAT RESORB 5CC -- MONTAGE [id number] [employer] [id number] N/A 1 Implanted   [place] Bone Graft Strip   N/A [employer] [id number] N/A 1 Implanted                1 Implanted   fluids: 5000 crystalloid, 500 albumin  375 autologous

## 2018-03-29 NOTE — PERIOP NOTES
PACU Summary  Patient arrived to PACU at   Bedside/Verbal report received from [address] CRNA and FREYA Uribe RN  Vitals:    03/29/18 1727 03/29/18 1735 03/29/18 1742 03/29/18 1757   BP: 113/79  114/57 109/77   Pulse: (!) 102 (!) 103 (!) 111 (!) 106   Resp: 16 19 16 17   Temp:       SpO2: 93% 94% 94% 99%   Weight:       Height:         Cardiac rhythm: Sinus Tachycardia     Lines and Drains  Peripheral Intravenous Line:    Chavira Cather:    Wound  Wound Spine (Active)   DRESSING STATUS Clean, dry, and intact 3/29/2018  5:00 PM   DRESSING TYPE Topical skin adhesive/glue 3/29/2018  5:00 PM   Incision site well approximated? Yes 3/29/2018  5:00 PM   Drainage Amount  Scant 3/29/2018  5:00 PM   Drainage Color Sanguinous 3/29/2018  5:00 PM   Wound Odor None 3/29/2018  5:00 PM   Number of days:0            Intake and Output    Intake/Output Summary (Last 24 hours) at 03/29/18 1831  Last data filed at 03/29/18 1811   Gross per 24 hour   Intake             5375 ml   Output             1750 ml   Net             3625 ml     Bedside report given to [employer], RN at [facility].         Anh Quintana RN

## 2018-03-29 NOTE — PERIOP NOTES
Dr. Tiara Cotto paged regarding pt's cont signs of severe pain despite Dilaudid PCA. According to pt's  and sister when pt was at [address] [facility] did not work. Dr. Harvey Night notified. Pt rests quietly but appears to have spasm and wakes up thrashing about. Will cont to monitor. Awaiting return call from Dr. Tiara Cotto. 1927  Dr. Tiara Cotto had not returned call despite 2 pages. Checked with neurovascular answering service and was informed Dr. Marylene Birkenhead was [place] on call for tonight. Awaiting Dr. Sofia Pino return call. Pt's  updated to same. Karlis Force Dr. Marylene Birkenhead returned my call. Given update on pt's pain control situation. Stated at this time he is not going to increase her pain medication. Will cont to monitor.

## 2018-03-29 NOTE — ANESTHESIA PREPROCEDURE EVALUATION
Anesthetic History     Increased risk of difficult airway          Review of Systems / Medical History  Patient summary reviewed and pertinent labs reviewed    Pulmonary  Within defined limits                 Neuro/Psych   Within defined limits           Cardiovascular    Hypertension: well controlled        Dysrhythmias       Exercise tolerance: >4 METS     GI/Hepatic/Renal  Within defined limits              Endo/Other        Arthritis     Other Findings   Comments: Documentation of current medication  Current medications obtained, documented and obtained? YES      Risk Factors for Postoperative nausea/vomiting:       History of postoperative nausea/vomiting? NO       Female? YES       Motion sickness? NO       Intended opioid administration for postoperative analgesia? YES      Smoking Abstinence:  Current Smoker? NO  Elective Surgery? YES  Seen preoperatively by anesthesiologist or proxy prior to day of surgery? YES  Pt abstained from smoking 24 hours prior to anesthesia?  N/A    Preventive care/screening for High Blood Pressure:  Aged 18 years and older: YES  Screened for high blood pressure: YES  Patients with high blood pressure referred to primary care provider   for BP management: YES                 Physical Exam    Airway  Mallampati: III  TM Distance: 4 - 6 cm  Neck ROM: decreased range of motion   Mouth opening: Normal     Cardiovascular    Rhythm: regular  Rate: normal         Dental  No notable dental hx       Pulmonary  Breath sounds clear to auscultation               Abdominal  GI exam deferred       Other Findings            Anesthetic Plan    ASA: 2  Anesthesia type: general          Induction: Intravenous  Anesthetic plan and risks discussed with: Patient

## 2018-03-29 NOTE — INTERVAL H&P NOTE
H&P Update:  Byron Anderson was seen and examined. History and physical has been reviewed. The patient has been examined.  There have been no significant clinical changes since the completion of the originally dated History and Physical.    Signed By: Ventura Giles MD     March 29, 2018 7:46 AM

## 2018-03-29 NOTE — PERIOP NOTES
ORALIAB for Ancef from Dr. Abran Pina. N Allergy.   [facility] Hermelinda Mathew

## 2018-03-29 NOTE — IP AVS SNAPSHOT
Cindy Lane 
 
 
 [address] [place] [address] [id number] [place] [address] Patient: Ross Hollins MRN: [id number] About your hospitalization You were admitted on:  March 29, 2018 You last received care in the:  [address] [place] [address],[address] You were discharged on:  April 10, 2018 Why you were hospitalized Your primary diagnosis was:  Lumbar Radiculopathy Your diagnoses also included:  Radiculopathy, Thoracic Region, Acquired Flat Back Syndrome Follow-up Information Follow up With Details Comments Contact Info [address] [facility] ([facility])   [address] [place], [place] [address] [phone] Slava Leach MD Schedule an appointment as soon as possible for a visit As needed [address] [place] [address] [phone] Shira Lu MD Schedule an appointment as soon as possible for a visit in 2 weeks  915 [facility] [address] [phone] Discharge Orders None A check rosendo indicates which time of day the medication should be taken. My Medications START taking these medications Instructions Each Dose to Equal  
 Morning Noon Evening Bedtime  
 gabapentin 400 mg capsule Commonly known as:  NEURONTIN Your last dose was: Your next dose is: Take 2 Caps by mouth three (3) times daily. 800 mg HYDROmorphone 8 mg tablet Commonly known as:  DILAUDID Your last dose was: Your next dose is: Take 1 Tab by mouth every eight (8) hours as needed. Max Daily Amount: 24 mg.  
 8 mg  
    
   
   
   
  
 naloxone 0.4 mg/mL injection Commonly known as:  [employer] Your last dose was: Your next dose is:     
   
   
 Take 1 mL by inhalation as needed for Other (Give if breaths per minute are less than 10, may repeat dose in 15 min and contact MD).  
 0.4 mg  
    
   
   
   
  
  
 CHANGE how you take these medications Instructions Each Dose to Equal  
 Morning Noon Evening Bedtime  
 cyclobenzaprine 10 mg tablet Commonly known as:  FLEXERIL What changed:  reasons to take this Your last dose was: Your next dose is: Take 1 Tab by mouth three (3) times daily as needed for Muscle Spasm(s). Indications: Muscle Spasm  
 10 mg  
    
   
   
   
  
 oxyCODONE IR 30 mg immediate release tablet Commonly known as:  Bg Benson What changed:   
- medication strength 
- how much to take - when to take this 
- reasons to take this Your last dose was: Your next dose is: Take 1 Tab by mouth every four (4) hours as needed. Max Daily Amount: 180 mg.  
 30 mg CONTINUE taking these medications Instructions Each Dose to Equal  
 Morning Noon Evening Bedtime CYMBALTA 60 mg capsule Generic drug:  DULoxetine Your last dose was: Your next dose is: Take 60 mg by mouth two (2) times a day. Indications: NEUROPATHIC PAIN  
 60 mg  
    
   
   
   
  
 TOPROL XL 25 mg XL tablet Generic drug:  metoprolol succinate Your last dose was: Your next dose is: Take 25 mg by mouth daily. Indications: Heart palpitation 25 mg Where to Get Your Medications Information on where to get these meds will be given to you by the nurse or doctor. ! Ask your nurse or doctor about these medications  
  cyclobenzaprine 10 mg tablet  
 gabapentin 400 mg capsule HYDROmorphone 8 mg tablet  
 naloxone 0.4 mg/mL injection  
 oxyCODONE IR 30 mg immediate release tablet Opioid Education  Prescription Opioids: What You Need to Know: 
 
Prescription opioids can be used to help relieve moderate-to-severe pain and are often prescribed following a surgery or injury, or for certain health conditions. These medications can be an important part of treatment but also come with serious risks. Opioids are strong pain medicines. Examples include hydrocodone, oxycodone, fentanyl, and morphine. Heroin is an example of an illegal opioid. It is important to work with your health care provider to make sure you are getting the safest, most effective care. WHAT ARE THE RISKS AND SIDE EFFECTS OF OPIOID USE? Prescription opioids carry serious risks of addiction and overdose, especially with prolonged use. An opioid overdose, often marked by slow breathing, can cause sudden death. The use of prescription opioids can have a number of side effects as well, even when taken as directed. · Tolerance-meaning you might need to take more of a medication for the same pain relief · Physical dependence-meaning you have symptoms of withdrawal when the medication is stopped. Withdrawal symptoms can include nausea, sweating, chills, diarrhea, stomach cramps, and muscle aches. Withdrawal can last up to several weeks, depending on which drug you took and how long you took it. · Increased sensitivity to pain · Constipation · Nausea, vomiting, and dry mouth · Sleepiness and dizziness · Confusion · Depression · Low levels of testosterone that can result in lower sex drive, energy, and strength · Itching and sweating RISKS ARE GREATER WITH:      
· History of drug misuse, substance use disorder, or overdose · Mental health conditions (such as depression or anxiety) · Sleep apnea · Older age (72 years or older) · Pregnancy Avoid alcohol while taking prescription opioids. Also, unless specifically advised by your health care provider, medications to avoid include: · Benzodiazepines (such as Xanax or Valium) · Muscle relaxants (such as Soma or Flexeril) · Hypnotics (such as Ambien or Lunesta) · Other prescription opioids KNOW YOUR OPTIONS Talk to your health care provider about ways to manage your pain that don't involve prescription opioids. Some of these options may actually work better and have fewer risks and side effects. Options may include: 
· Pain relievers such as acetaminophen, ibuprofen, and naproxen · Some medications that are also used for depression or seizures · Physical therapy and exercise · Counseling to help patients learn how to cope better with triggers of pain and stress. · Application of heat or cold compress · Massage therapy · Relaxation techniques Be Informed Make sure you know the name of your medication, how much and how often to take it, and its potential risks & side effects. IF YOU ARE PRESCRIBED OPIOIDS FOR PAIN: 
· Never take opioids in greater amounts or more often than prescribed. Remember the goal is not to be pain-free but to manage your pain at a tolerable level. · Follow up with your primary care provider to: · Work together to create a plan on how to manage your pain. · Talk about ways to help manage your pain that don't involve prescription opioids. · Talk about any and all concerns and side effects. · Help prevent misuse and abuse. · Never sell or share prescription opioids · Help prevent misuse and abuse. · Store prescription opioids in a secure place and out of reach of others (this may include visitors, children, friends, and family). · Safely dispose of unused/unwanted prescription opioids: Find your community drug take-back program or your pharmacy mail-back program, or flush them down the toilet, following guidance from the Food and Drug Administration (www.fda.gov/Drugs/ResourcesForYou). · Visit www.cdc.gov/drugoverdose to learn about the risks of opioid abuse and overdose. · If you believe you may be struggling with addiction, tell your health care provider and ask for guidance or call [facility] at 4-004-382-VSKL. Discharge Instructions Learning About Degenerative Disc Disease What is degenerative disc disease? Degenerative disc disease is not really a disease. It's a term used to describe the normal changes in your spinal discs as you age. Spinal discs are small, spongy discs that separate the bones (vertebrae) that make up the spine. The discs act as shock absorbers for the spine, so it can flex, bend, and twist. 
Degenerative disc disease can take place in one or more places along the spine. It most often occurs in the discs in the lower back and the neck. What causes it? As we age, our spinal discs break down, or degenerate. This breakdown causes the symptoms of degenerative disc disease in some people. When the discs break down, they can lose fluid and dry out, and their outer layers can have tiny cracks or tears. This leads to less padding and less space between the bones in the spine. The body reacts to this by making bony growths on the spine called bone spurs. These spurs can press on the spinal nerve roots or spinal cord. This can cause pain and can affect how well the nerves work. What are the symptoms? Many people with degenerative disc disease have no pain. But others have severe pain or other symptoms that limit their activities. Some of the most common symptoms are: 
· Pain in the back or neck. Where the pain occurs depends on which discs are affected. · Pain that gets worse when you move, such as bending over, reaching up, or twisting. · Pain that may occur in the rear end (buttocks), arm, or leg if a nerve is pinched. · Numbness or tingling in your arm or leg. How is it diagnosed? A doctor can often diagnose degenerative disc disease while doing a physical exam. If your exam shows no signs of a serious condition, imaging tests (such as an X-ray) aren't likely to help your doctor find the cause of your symptoms. Sometimes degenerative disc disease is found when an X-ray is taken for another reason, such as an injury or other health problem. But even if the doctor finds degenerative disc disease, that doesn't always mean that you will have symptoms. How is it treated? There are several things you can do at home to manage pain from this problem. · To relieve pain, use ice or heat (whichever feels better) on the affected area. ¨ Put ice or a cold pack on the area for 10 to 20 minutes at a time. Put a thin cloth between the ice and your skin. ¨ Put a warm water bottle, a heating pad set on low, or a warm cloth on your back. Put a thin cloth between the heating pad and your skin. Do not go to sleep with a heating pad on your skin. · Ask your doctor if you can take acetaminophen (such as Tylenol) or nonsteroidal anti-inflammatory drugs, such as ibuprofen or naproxen. Your doctor can prescribe stronger medicines if needed. Be safe with medicines. Read and follow all instructions on the label. · Get some exercise every day. Exercise is one of the best ways to help your back feel better and stay better. It's best to start each exercise slowly. You may notice a little soreness, and that's okay. But if an exercise makes your pain worse, stop doing it. Here are things you can try: ¨ Walking. It's the simplest and maybe the best activity for your back. It gets your blood moving and helps your muscles stay strong. ¨ Exercises that gently stretch and strengthen your stomach, back, and leg muscles. The stronger those muscles are, the better they're able to protect your back. If you have constant or severe pain in your back or spine, you may need other treatments, such as physical therapy. In some cases, your doctor may suggest surgery. Follow-up care is a key part of your treatment and safety.  Be sure to make and go to all appointments, and call your doctor if you are having problems. It's also a good idea to know your test results and keep a list of the medicines you take. Where can you learn more? Go to http://sridhar-neftali.info/. Enter M281 in the search box to learn more about \"Learning About Degenerative Disc Disease. \" Current as of: March 21, 2017 Content Version: 11.5 © 1956-1230 [employer]. Care instructions adapted under license by [employer] (which disclaims liability or warranty for this information). If you have questions about a medical condition or this instruction, always ask your healthcare professional. [place] [address] any warranty or liability for your use of this information. Gabapentin (By mouth) Gabapentin (young-a-PEN-tin) Treats seizures and pain caused by shingles. Brand Name(s): ACTIVE-PAC with Gabapentin, Convenience Marcin, Cyclo/Richy 10/300 Pack, DIRECTV, Richy-V, Gralise, Gralise Starter Pack, Neurontin, [employer] Kit There may be other brand names for this medicine. When This Medicine Should Not Be Used: This medicine is not right for everyone. Do not use it if you had an allergic reaction to gabapentin. How to Use This Medicine:  
Capsule, Liquid, Tablet · Take your medicine as directed. Your dose may need to be changed several times to find what works best for you. If you have epilepsy, do not allow more than 12 hours to pass between doses. · Capsule: Swallow the capsule whole with plenty of water. Do not open, crush, or chew it. · Gralise® tablet: Swallow the tablet whole . Do not crush, break, or chew it. · Neurontin® tablet: If you break a tablet into 2 pieces, use the second half as your next dose. If you don't use it within 28 days, throw it away. · Measure the oral liquid medicine with a marked measuring spoon, oral syringe, or medicine cup. · This medicine should come with a Medication Guide. Ask your pharmacist for a copy if you do not have one. · Missed dose: Take a dose as soon as you remember. If it is almost time for your next dose, wait until then and take a regular dose. Do not take extra medicine to make up for a missed dose. · Store the medicine in a closed container at room temperature, away from heat, moisture, and direct light. Store the Neurontin® oral liquid in the refrigerator. Do not freeze. Drugs and Foods to Avoid: Ask your doctor or pharmacist before using any other medicine, including over-the-counter medicines, vitamins, and herbal products. · Some medicines can affect how gabapentin works. Tell your doctor if you also use any of the following: ¨ Hydrocodone ¨ Morphine · If you take an antacid, wait at least 2 hours before you take gabapentin. · Tell your doctor if you use anything else that makes you sleepy. Some examples are allergy medicine, narcotic pain medicine, and alcohol. Warnings While Using This Medicine: · Tell your doctor if you are pregnant or breastfeeding, or if you have kidney problems or are receiving dialysis. Tell your doctor if you have a history of depression or mental health problems. · This medicine may increase depression or thoughts of suicide. Tell your doctor right away if you start to feel more depressed or think about hurting yourself. · This medicine may cause a serious allergic reaction called multiorgan hypersensitivity, which can damage organs and be life-threatening. · Do not stop using this medicine suddenly. Your doctor will need to slowly decrease your dose before you stop it completely. If you take this medicine to prevent seizures, your seizures may return or occur more often if you stop this medicine suddenly. · This medicine may make you dizzy or drowsy. Do not drive or do anything else that could be dangerous until you know how this medicine affects you.  
· Tell any doctor or dentist who treats you that you are using this medicine. This medicine may affect certain medical test results. · Your doctor will check your progress and the effects of this medicine at regular visits. Keep all appointments. · Keep all medicine out of the reach of children. Never share your medicine with anyone. Possible Side Effects While Using This Medicine:  
Call your doctor right away if you notice any of these side effects: · Allergic reaction: Itching or hives, swelling in your face or hands, swelling or tingling in your mouth or throat, chest tightness, trouble breathing · Behavior problems, aggression, restlessness, trouble concentrating, moodiness (especially in children) · Blistering, peeling, red skin rash · Change in how much or how often you urinate, bloody or cloudy urine, 
· Chest pain, fast heartbeat, trouble breathing · Dark urine or pale stools, nausea, vomiting, loss of appetite, stomach pain, yellow skin or eyes · Fever, rash, swollen or tender glands in the neck, armpit, or groin · Problems with coordination, shakiness, unsteadiness · Rapid weight gain, swelling in your hands, ankles, or feet · Unusual moods or behaviors, thoughts of hurting yourself, feeling depressed If you notice these less serious side effects, talk with your doctor: · Dizziness, drowsiness, sleepiness, tiredness If you notice other side effects that you think are caused by this medicine, tell your doctor. Call your doctor for medical advice about side effects. You may report side effects to FDA at 4-753-FDA-2419 © 2017 [facility] Information is for End User's use only and may not be sold, redistributed or otherwise used for commercial purposes. The above information is an  only. It is not intended as medical advice for individual conditions or treatments. Talk to your doctor, nurse or pharmacist before following any medical regimen to see if it is safe and effective for you. Back Pain: Care Instructions Your Care Instructions Back pain has many possible causes. It is often related to problems with muscles and ligaments of the back. It may also be related to problems with the nerves, discs, or bones of the back. Moving, lifting, standing, sitting, or sleeping in an awkward way can strain the back. Sometimes you don't notice the injury until later. Arthritis is another common cause of back pain. Although it may hurt a lot, back pain usually improves on its own within several weeks. Most people recover in 12 weeks or less. Using good home treatment and being careful not to stress your back can help you feel better sooner. Follow-up care is a key part of your treatment and safety. Be sure to make and go to all appointments, and call your doctor if you are having problems. It's also a good idea to know your test results and keep a list of the medicines you take. How can you care for yourself at home? · Sit or lie in positions that are most comfortable and reduce your pain. Try one of these positions when you lie down: ¨ Lie on your back with your knees bent and supported by large pillows. ¨ Lie on the floor with your legs on the seat of a sofa or chair. Sherle Plants on your side with your knees and hips bent and a pillow between your legs. ¨ Lie on your stomach if it does not make pain worse. · Do not sit up in bed, and avoid soft couches and twisted positions. Bed rest can help relieve pain at first, but it delays healing. Avoid bed rest after the first day of back pain. · Change positions every 30 minutes. If you must sit for long periods of time, take breaks from sitting. Get up and walk around, or lie in a comfortable position. · Try using a heating pad on a low or medium setting for 15 to 20 minutes every 2 or 3 hours. Try a warm shower in place of one session with the heating pad. · You can also try an ice pack for 10 to 15 minutes every 2 to 3 hours. Put a thin cloth between the ice pack and your skin. · Take pain medicines exactly as directed. ¨ If the doctor gave you a prescription medicine for pain, take it as prescribed. ¨ If you are not taking a prescription pain medicine, ask your doctor if you can take an over-the-counter medicine. · Take short walks several times a day. You can start with 5 to 10 minutes, 3 or 4 times a day, and work up to longer walks. Walk on level surfaces and avoid hills and stairs until your back is better. · Return to work and other activities as soon as you can. Continued rest without activity is usually not good for your back. · To prevent future back pain, do exercises to stretch and strengthen your back and stomach. Learn how to use good posture, safe lifting techniques, and proper body mechanics. When should you call for help? Call your doctor now or seek immediate medical care if: 
? · You have new or worsening numbness in your legs. ? · You have new or worsening weakness in your legs. (This could make it hard to stand up.) ? · You lose control of your bladder or bowels. ? Watch closely for changes in your health, and be sure to contact your doctor if: 
? · Your pain gets worse. ? · You are not getting better after 2 weeks. Where can you learn more? Go to http://sridhar-neftali.info/. Enter Y647 in the search box to learn more about \"Back Pain: Care Instructions. \" Current as of: March 21, 2017 Content Version: 11.4 © 7674-1458 [employer]. Care instructions adapted under license by [employer] (which disclaims liability or warranty for this information). If you have questions about a medical condition or this instruction, always ask your healthcare professional. [address] any warranty or liability for your use of this information. Cyclobenzaprine (Flexeril, Amrix, Fexmid, FusePaq Tabradol) - (By mouth) Why this medicine is used:  
Treats pain and stiffness caused by muscle spasms. Contact a nurse or doctor right away if you have: · Anxiety, restlessness, twitching · Seeing or hearing things that are not there · Fast, pounding, or uneven heartbeat · Fever, sweating, nausea, vomiting, diarrhea Common side effects: · Dry mouth · Dizziness, drowsiness © 2017 [address] Information is for End User's use only and may not be sold, redistributed or otherwise used for commercial purposes. Hydromorphone (By mouth) Hydromorphone (lav-edma-FXZ-fone) Treats moderate to severe pain. This medicine is a narcotic pain reliever. Brand Name(s): Dilaudid, Exalgo There may be other brand names for this medicine. When This Medicine Should Not Be Used: This medicine is not right for everyone. Do not use it if you had an allergic reaction to hydromorphone or sulfites, or if you have severe lung or breathing problems, or stomach or bowel blockage (including paralytic ileus). How to Use This Medicine:  
Long Acting Capsule, Liquid, Tablet, Long Acting Tablet · Take your medicine as directed. Your dose may need to be changed several times to find what works best for you. An overdose can be dangerous. Follow directions carefully so you do not get too much medicine at one time. · Oral liquid: Measure the oral liquid medicine with a marked measuring spoon, oral syringe, or medicine cup. If you get any of the oral liquid on your skin, rinse it with cool water right away. · Swallow the extended-release capsule whole. Do not crush, break, or chew it. · Swallow the extended-release tablet whole. Do not crush, break, or chew it. · If you take the extended-release tablet, part of the tablet may pass into your stools. This is normal and is nothing to worry about. · Hydromorphone extended-release capsules or tablets work differently than regular hydromorphone tablets, even at the same dose. Do not switch from one form to another unless your doctor tells you to. · This medicine should come with a Medication Guide. Ask your pharmacist for a copy if you do not have one. · Missed dose:  
¨ Extended-release capsules or tablets: If you miss a dose, skip the missed dose and take your next dose at the usual time the next day. Do not double doses. ¨ Oral liquid: Take a dose as soon as you remember. If it is almost time for your next dose, wait until then and take a regular dose. Do not take extra medicine to make up for a missed dose. · Store the medicine in a closed container at room temperature, away from heat, moisture, and direct light. Store the medicine in a safe and secure place. Do not throw unused medicine in the trash. Ask your pharmacist about the best way to dispose of medicine you do not use. Drugs and Foods to Avoid: Ask your doctor or pharmacist before using any other medicine, including over-the-counter medicines, vitamins, and herbal products. · Do not use this medicine if you are using or have used an MAO inhibitor within the past 14 days. · Some medicines can affect how hydromorphone works. Tell your doctor if you are using any of the following: ¨ Blood pressure medicine ¨ Diuretic (water pill) ¨ Medicine to treat depression ¨ Phenothiazine medicine · Do not drink alcohol while you are using this medicine. · Tell your doctor if you use anything else that makes you sleepy. Some examples are allergy medicine, narcotic pain medicine, and alcohol. Tell your doctor if you are also using buprenorphine, butorphanol, nalbuphine, pentazocine, or a muscle relaxer. Warnings While Using This Medicine: · Tell your doctor if you are pregnant or breastfeeding, or if you have kidney disease, liver disease, lung disease or breathing problems (such as asthma or COPD), an underactive thyroid, adrenal problems, cystic fibrosis, pancreas problems, gallbladder problems, an enlarged prostate, trouble urinating, or stomach or bowel problems.  Tell your doctor if you have a history of head injury, brain tumor, seizures, depression, or alcohol or drug abuse. · This medicine may cause the following problems: 
¨ High risk of overdose, which can lead to death ¨ Respiratory depression (serious breathing problem that can be life-threatening) ¨ Serotonin syndrome, when used with certain medicines · This medicine can be habit-forming. Do not use more than your prescribed dose. Call your doctor if you think your medicine is not working. · Do not stop using this medicine suddenly. Your doctor will need to slowly decrease your dose before you stop it completely. · This medicine may make you dizzy, drowsy, or lightheaded. Do not drive or do anything else that could be dangerous until you know how this medicine affects you. Sit or lie down if you feel dizzy. Stand up carefully. · This medicine could cause infertility. Talk with your doctor before using this medicine if you plan to have children. · This medicine may cause constipation, especially with long-term use. Ask your doctor if you should use a laxative to prevent and treat constipation. · Keep all medicine out of the reach of children. Never share your medicine with anyone. Possible Side Effects While Using This Medicine:  
Call your doctor right away if you notice any of these side effects: · Allergic reaction: Itching or hives, swelling in your face or hands, swelling or tingling in your mouth or throat, chest tightness, trouble breathing · Anxiety, restlessness, fast heartbeat, fever, sweating, muscle spasms, twitching, nausea, vomiting, diarrhea, seeing or hearing things that are not there · Blue lips, fingernails, or skin · Extreme dizziness or weakness, shallow breathing, slow or uneven heartbeat, sweating, cold or clammy skin, seizures · Severe confusion, lightheadedness, dizziness, fainting · Severe constipation, stomach pain, or vomiting · Trouble breathing or slow breathing If you notice these less serious side effects, talk with your doctor: · Mild constipation, nausea, or vomiting · Mild sleepiness or tiredness If you notice other side effects that you think are caused by this medicine, tell your doctor. Call your doctor for medical advice about side effects. You may report side effects to FDA at 4-389-FDA-6398 © 2017 [address] Information is for End User's use only and may not be sold, redistributed or otherwise used for commercial purposes. The above information is an  only. It is not intended as medical advice for individual conditions or treatments. Talk to your doctor, nurse or pharmacist before following any medical regimen to see if it is safe and effective for you. Oxycodone, Rapid Release (ETH-Oxydose, Oxy IR, Roxicodone) - (By mouth) Why this medicine is used:  
Treats moderate to severe pain. This medicine is a narcotic pain reliever. Contact a nurse or doctor right away if you have: 
· Fast or slow heart beat, shallow breathing, blue lips, skin or fingernails · Anxiety, restlessness, fever, sweating, muscle spasms, twitching, seeing or hearing things that are not there · Extreme weakness, shallow breathing, slow heartbeat · Severe confusion, lightheadedness, dizziness, fainting · Sweating or cold, clammy skin, seizures · Severe constipation, stomach pain, nausea, vomiting Common side effects: · Mild constipation · Sleepiness, tiredness © 2017 [address] Information is for End User's use only and may not be sold, redistributed or otherwise used for commercial purposes. Learning About Naloxone for Opioid Overdose What is naloxone? Opioids are strong pain medicines. Examples include hydrocodone, oxycodone, fentanyl, and morphine. Heroin is an example of an illegal opioid. Taking too much of an opioid can cause death. An overdose is an emergency. Naloxone is a medicine that reverses the effects of an overdose. If you take it soon enough after an overdose, it can save your life. Naloxone comes in a rescue kit you can carry with you. You may hear it called a Narcan kit for short. The rescue kit may contain: · The medicine. · Syringes and needles. · A nasal adapter for the syringes. · A separate nasal spray device. Your doctor can give you a prescription for a rescue kit and show you how to use it. In some places you can buy Narcan kits without a prescription. When is naloxone used? Naloxone is used when a person shows signs of an opioid overdose. A person may have overdosed if he or she is: · Sleepy or hard to wake up. · Confused. · Not breathing normally. Make sure your family and friends know about these signs of an overdose. If someone appears to have overdosed, call 911. A drug overdose is an emergency. How do you use it? If you overdose, you may not be able to give yourself the medicine. So it's very important to teach friends and family how and when to give it to you. Rescue kits come with instructions. There are two ways to give the medicine. Many rescue kits can be used for either method. The methods are: · Injection with needle and syringe. ¨ Training may be needed in order to use this method correctly. ¨ Some rescue kits come with automatic injectors that don't require special training. ¨ The medicine can be injected through clothing. · Nasal spray. ¨ Many rescue kits come with a nasal adapter. It attaches to the syringe and turns the medicine into a mist. 
¨ The mist is sprayed into the nose of a person who has overdosed. The person needs to be lying down when the mist is sprayed. Overdose symptoms may return a few minutes after the first dose from the rescue kit. If that happens, a second dose is needed. Rescue kits come with two doses for that reason. Keep your rescue kit with you always.  You never know when you might need it. 
If you think you or someone else may have overdosed but you're not sure, use the kit anyway. Aside from going through withdrawal, which may be uncomfortable, there is no downside to using this medicine. Always go to the emergency room after using naloxone. Doctors will want to make sure the overdose has been reversed. Follow-up care is a key part of your treatment and safety. Be sure to make and go to all appointments, and call your doctor if you are having problems. It's also a good idea to know your test results and keep a list of the medicines you take. Where can you learn more? Go to http://sridhar-neftali.info/. Enter K333 in the search box to learn more about \"Learning About Naloxone for Opioid Overdose. \" Current as of: November 3, 2016 Content Version: 11.4 © 7044-9899 [employer]. Care instructions adapted under license by [employer] (which disclaims liability or warranty for this information). If you have questions about a medical condition or this instruction, always ask your healthcare professional. [place] [address] any warranty or liability for your use of this information. Patient armband removed and shredded [email or web address] Announcement We are excited to announce that we are making your provider's discharge notes available to you in [email or web address]. You will see these notes when they are completed and signed by the physician that discharged you from your recent hospital stay. If you have any questions or concerns about any information you see in [email or web address], please call the Health Information Department where you were seen or reach out to your Primary Care Provider for more information about your plan of care. Introducing [facility]! [facility] introduces [email or web address] patient portal. Now you can access parts of your medical record, email your doctor's office, and request medication refills online. 1. In your internet browser, go to [email or web address] 2. Click on the First Time User? Click Here link in the Sign In box. You will see the New Member Sign Up page. 3. Enter your [facility] Access Code exactly as it appears below. You will not need to use this code after youve completed the sign-up process. If you do not sign up before the expiration date, you must request a new code. · [facility] Access Code: [email or web address] Expires: 6/25/2018  4:16 PM 
 
4. Enter the last four digits of your Social Security Number (xxxx) and Date of Birth (mm/dd/yyyy) as indicated and click Submit. You will be taken to the next sign-up page. 5. Create a [employer] [place] [employer] ID. This will be your [facility] login ID and cannot be changed, so think of one that is secure and easy to remember. 6. Create a [facility] password. You can change your password at any time. 7. Enter your Password Reset Question and Answer. This can be used at a later time if you forget your password. 8. Enter your e-mail address. You will receive e-mail notification when new information is available in [address] 9. Click Sign Up. You can now view and download portions of your medical record. 10. Click the Download Summary menu link to download a portable copy of your medical information. If you have questions, please visit the Frequently Asked Questions section of the [facility] website. Remember, [facility] is NOT to be used for urgent needs. For medical emergencies, dial 911. Now available from your iPhone and Android! Introducing Bryant Noel As a [employer] patient, I wanted to make you aware of our electronic visit tool called Bryant Noel. [employer] 24/7 allows you to connect within minutes with a medical provider 24 hours a day, seven days a week via a mobile device or tablet or logging into a secure website from your computer. You can access Bryant Noel from anywhere in the [place]. A virtual visit might be right for you when you have a simple condition and feel like you just dont want to get out of bed, or cant get away from work for an appointment, when your regular [facility] provider is not available (evenings, weekends or holidays), or when youre out of town and need minor care. Electronic visits cost only $49 and if the [employer] 24/7 provider determines a prescription is needed to treat your condition, one can be electronically transmitted to a nearby pharmacy*. Please take a moment to enroll today if you have not already done so. The enrollment process is free and takes just a few minutes. To enroll, please download the FIRE1/Sensorist alessandra to your tablet or phone, or visit [email or web address] to enroll on your computer. And, as an [address] [place] [address] patient with a [employer] account, the results of your visits will be scanned into your electronic medical record and your primary care provider will be able to view the scanned results. We urge you to continue to see your regular [facility] provider for your ongoing medical care. And while your primary care provider may not be the one available when you seek a [facility] virtual visit, the peace of mind you get from getting a real diagnosis real time can be priceless. For more information on [facility], view our Frequently Asked Questions (FAQs) at [email or web address]. Sincerely, 
 
Rani Garcia MD 
Chief Medical Officer [employer] *:  certain medications cannot be prescribed via [facility] Unresulted Labs-Please follow up with your PCP about these lab tests Order Current Status XR SPINE LUMB 2 OR 3 V In process Providers Seen During Your Hospitalization Provider Specialty Primary office phone Osmar Mansfield MD Neurosurgery [phone] Your Primary Care Physician (PCP) Primary Care Physician Office Phone Office Fax [place] [phone] You are allergic to the following Allergen Reactions Pcn (Penicillins) Rash As a child. Recent Documentation Height Weight BMI OB Status Smoking Status 1.676 m 58.4 kg 20.79 kg/m2 Postmenopausal Never Smoker Emergency Contacts Name Discharge Info Relation Home Work Mobile Steven Ann DISCHARGE CAREGIVER [3] Spouse [3]   [phone] Veldasha Slot CAREGIVER [3] Sister [23]   [phone] Patient Belongings The following personal items are in your possession at time of discharge: 
  Dental Appliances: None  Visual Aid: None      Home Medications: None   Jewelry: Other (comment)  Clothing: Jacket/Coat, Footwear, Pants, Shirt, Socks, Undergarments    Other Valuables: Purse, Cell Phone Discharge Instructions Attachments/References LUMBAR SPINAL FUSION: POST-OP (ENGLISH) Patient Handouts Lumbar Spinal Fusion: What to Expect at Home Your Recovery After surgery, you can expect your back to feel stiff and sore. You may have trouble sitting or standing in one position for very long and may need pain medicine in the weeks after your surgery. It may take 4 to 6 weeks to get back to doing simple activities, such as light housework. It may take 6 months to a year for your back to get better completely. You may need to wear a back brace while your back heals. And your doctor may have you go to physical therapy. If your job doesn't require physical labor, you will probably be able to go back to work after 1 or 2 months. If your job involves light physical labor, it may take 3 to 6 months. Most people whose jobs involved heavy labor can never return to those jobs. This care sheet gives you a general idea about how long it will take for you to recover. But each person recovers at a different pace. Follow the steps below to get better as quickly as possible. How can you care for yourself at home? Activity ? · Rest when you feel tired. Getting enough sleep will help you recover. ? · Try to walk each day. Start by walking a little more than you did the day before. Bit by bit, increase the amount you walk. Walking boosts blood flow and helps prevent pneumonia and constipation. Walking may also decrease your muscle soreness after surgery. ? · If advised by your doctor, you may need to avoid lifting anything that would cause excessive strain on your back. This may include a child, heavy grocery bags and milk containers, a heavy briefcase or backpack, cat litter or dog food bags, or a vacuum . ? · Avoid strenuous activities, such as bicycle riding, jogging, weight lifting, or aerobic exercise, until your doctor says it is okay. ? · Do not drive for 2 to 4 weeks after your surgery or until your doctor says it is okay. ? · Avoid riding in a car for more than 30 minutes at a time for 2 to 4 weeks after surgery. If you must ride in a car for a longer distance, stop often to walk and stretch your legs. ? · Try to change your position about every 30 minutes while sitting or standing. This will help decrease your back pain while you are healing. ? · You will probably need to take at least 4 to 6 weeks off from work. It depends on the type of work you do and how you feel. ? · You may have sex as soon as you feel able, but avoid positions that put stress on your back or cause pain. Diet ? · You can eat your normal diet. If your stomach is upset, try bland, low-fat foods like plain rice, broiled chicken, toast, and yogurt. ? · Drink plenty of fluids (unless your doctor tells you not to). ? · You may notice that your bowel movements are not regular right after your surgery. This is common. Try to avoid constipation and straining with bowel movements. You may want to take a fiber supplement every day.  If you have not had a bowel movement after a couple of days, ask your doctor about taking a mild laxative. Medicines ? · Be safe with medicines. Take pain medicines exactly as directed. ¨ If the doctor gave you a prescription medicine for pain, take it as prescribed. ¨ If you are not taking a prescription pain medicine, ask your doctor if you can take an over-the-counter medicine. ? · If your doctor prescribed antibiotics, take them as directed. Do not stop taking them just because you feel better. You need to take the full course of antibiotics. ? · If you think your pain medicine is making you sick to your stomach: 
¨ Take your medicine after meals (unless your doctor has told you not to). ¨ Ask your doctor for a different pain medicine. Incision care ? · You will be given specific instructions about how to care for the cuts (incisions) the doctor made. The instructions will depend on the type of materials used to close the cut. Exercise ? · Do back exercises as instructed by your doctor. ? · Your doctor may advise you to work with a physical therapist to improve the strength and flexibility of your back. Other instructions ? · To reduce stiffness and help sore muscles, use a warm water bottle, a heating pad set on low, or a warm cloth on your back. Do not put heat right over the incision. Do not go to sleep with a heating pad on your skin. Follow-up care is a key part of your treatment and safety. Be sure to make and go to all appointments, and call your doctor if you are having problems. It's also a good idea to know your test results and keep a list of the medicines you take. When should you call for help? Call 911 anytime you think you may need emergency care. For example, call if: 
? · You passed out (lost consciousness). ? · You have sudden chest pain and shortness of breath, or you cough up blood. ? · You are unable to move a leg at all. ?Call your doctor now or seek immediate medical care if: 
? · You have pain that does not get better after you take pain pills. ? · You have new or worse symptoms in your legs or buttocks. Symptoms may include: ¨ Numbness or tingling. ¨ Weakness. ¨ Pain. ? · You lose bladder or bowel control. ? · You have loose stitches, or your incision comes open. ? · You have blood or fluid draining from the incision. ? · You have signs of infection, such as: 
¨ Increased pain, swelling, warmth, or redness. ¨ Pus draining from the incision. ¨ A fever. ? Watch closely for any changes in your health, and be sure to contact your doctor if: 
? · You do not have a bowel movement after taking a laxative. ? · You are not getting better as expected. Where can you learn more? Go to http://sridhar-neftali.info/. Enter V944 in the search box to learn more about \"Lumbar Spinal Fusion: What to Expect at Home. \" Current as of: March 21, 2017 Content Version: 11.4 © 2743-3897 SynapticMash. Care instructions adapted under license by [employer] (which disclaims liability or warranty for this information). If you have questions about a medical condition or this instruction, always ask your healthcare professional. [address] any warranty or liability for your use of this information. Please provide this summary of care documentation to your next provider. Signatures-by signing, you are acknowledging that this After Visit Summary has been reviewed with you and you have received a copy. Patient Signature:  ____________________________________________________________ Date:  ____________________________________________________________  
  
Cristal Solis Provider Signature:  ____________________________________________________________ Date:  ____________________________________________________________

## 2018-03-29 NOTE — CONSULTS
[facility]  Hospitalist Division    Consult Note    Patient: Marie Mota MRN: [id number]  [facility]: [id number]    YOB: 1960  Age: 60 y.o. Sex: female    DOA: 3/29/2018 LOS:  LOS: 0 days        Requesting Physician:  Dr. Ibeth Funk  Reason for Consultation:  Medical Management    Chief Complaint:  Thoracic Lumbar Radiculopathy    Assessment/Plan     Patient Active Problem List   Diagnosis Code    Chronic low back pain M54.5, G89.29    Degeneration of lumbar or lumbosacral intervertebral disc M51.37    Encounter for long-term (current) use of high-risk medication Z79.899    Myalgia and myositis, unspecified [id number]    Spasm of muscle M62.838    Lumbar stenosis M48.061    Hypertension I10    Osteoarthritis of right hip M16.11    Osteoarthritis of left hip M16.12    Lumbar radiculopathy M54.16    Radiculopathy, thoracic region M54.14    Acquired flat back syndrome M40.30       A/P:  Thoracic Lumbar Radiculopathy  - s/p exposure of previous T10-L3 fusion / removal of hardware L3 pedicle subtraction osteotomy instrumentaion ilium tto t10, bilateral dural repair  - IVF's  - Neuro checks every hour  - complete bedrest, flat in bed   - drain care Q8H  - suspect pain control will be an issue- pt was in pain management PTA  - Narcan PRN  - Clindamycin TID  - Decadron 4 mg IV TID  - Encourage pulmonary toilet, incentive spirometer    Hx of heart palpitations  - on Metoprolol - XL 25 mg daily    DVT Prophylaxis  - SCD's BLE  - anticoagulation contraindicated - neuraxial anesthesia has been administered      We appreciate the consultation for medical management and appreciate being able to be involved with their care during hospitalization.     HPI:     Marie Mota is a 60 y.o. female with a hx of lumbar radiculopathy, chronic back pain and flat back syndrome. She is followed by Dr. Ibeth Funk, last seen by Dr. Jenifer Hopper on 2/21/2018 for presurgical consultation.   Pt with report of pain in two regions of her back, mid thoracic pain described as aching, burning and stabbing and then a low lumbar pain that is roughly in the \"L4-L5 area. \"  Pt has prior hx of cervical fusion by Dr. Melany Finley from C4-C5 down to C6-C7, hx of thoracolumbar fusion from T11 to L2. She's had ALIF at L5-S1 and lumbar laminectomy at L2-L3. Spinal pelvic parameters pelvic tilts 27 degrees with pelvic incidence of 61 degrees and lumbar lordorsis of 43 for a mismatch of 18 degrees. Pt is followed by Dr. Elda Noriega for pain management. Pt examined shortly after being sent to PACU- moaning out in pain. Per nurse, EBL 1135 ml, received 325 ml back as well as IV Albumin. For stat CBC. Past Medical History:   Diagnosis Date    Acute sinusitis     Adverse effect of anesthesia     chronic pain patient with difficulty controlling pain postoperatively    Arrhythmia     palpatation    Chronic pain     low back    DDD (degenerative disc disease)     Heart palpitations     Neuropathy     Osteoarthritis of left hip 2/4/2016    Osteoarthritis of right hip 8/30/2015       Past Surgical History:   Procedure Laterality Date   [facility] SURGERY  1985,1987,2002,2005 ,,[id number]    x8    HX BUNIONECTOMY Left 2011    HX DILATION AND CURETTAGE      HX ORTHOPAEDIC  9-2015    right hip replacement    TOTAL HIP ARTHROPLASTY  2016    left hip       History reviewed. No pertinent family history. Social History     Social History    Marital status:      Spouse name: N/A    Number of children: N/A    Years of education: N/A     Social History Main Topics    Smoking status: Never Smoker    Smokeless tobacco: Never Used    Alcohol use No    Drug use: No    Sexual activity: Yes     Birth control/ protection: IUD     Other Topics Concern    None     Social History Narrative       Prior to Admission medications    Medication Sig Start Date End Date Taking?  Authorizing Provider   cyclobenzaprine (FLEXERIL) 10 mg tablet Take 10 mg by mouth three (3) times daily as needed. Indications: MUSCLE SPASM   Yes Historical Provider   DULoxetine (CYMBALTA) 60 mg capsule Take 60 mg by mouth two (2) times a day. Indications: NEUROPATHIC PAIN   Yes Historical Provider   metoprolol-XL (TOPROL XL) 25 mg XL tablet Take 25 mg by mouth daily. Indications: Heart palpitation   Yes Historical Provider   oxyCODONE IR (ROXICODONE) 20 mg immediate release tablet Take 1 Tab by mouth every three (3) hours as needed for Pain. Max Daily Amount: 160 mg. Patient taking differently: Take 30 mg by mouth every four (4) hours. 2/10/16   Rafaela Mason MD       Allergies   Allergen Reactions    Pcn [Penicillins] Rash     As a child. Review of Systems  - fever, - chills, - fatigue, - weight loss, - night sweats   - sore throat, - sinus congestion, - lymphadenopathy, - vision changes  - CP, -  palpitations  - dyspnea on exertion, - dyspnea at rest, - cough, - hemoptysis  - nausea, - vomiting, - diarrhea, - abdominal pain, - reflux, - dysphagia  - dysuria, - hematuria, - urinary frequency  - rash, - pruritis  - back pain, - neck pain, - myalgia, - arthralgia  - H/A, - numbness, - tingling, - weakness, - slurred speech    Physical Exam:      Visit Vitals    /76 (BP 1 Location: Left arm, BP Patient Position: At rest)    Pulse 72    Temp 97.2 °F (36.2 °C)    Resp 16    Ht 5' 6\" (1.676 m)    Wt 58.4 kg (128 lb 12.8 oz)    SpO2 100%    BMI 20.79 kg/m2       Physical Exam:  Gen: In general, this is a well nourished  female, in distress, moaning out in pain, note left and right drain intact  HEENT: Mild facial edema noted presume from surgery  Neck: Supple with midline trachea. CV: tachycardia, without murmur or rub appreciated. Resp: Respirations are unlabored without use of accessory muscles. Lung fields bilaterally without wheezes or rhonchi  Abd: Soft, nontender, nondistended.   Extrem: Extremities are warm, without cyanosis or clubbing.  +1 edema noted to bilateral forearms. No edema BLE. Palpable distal pulses X 4.   Skin: Warm, no visible rashes. Unable to view dressing at this time, significant pain, moaning  Neuro: Patient is alert, oriented, and cooperative. No obvious focal defects. Moves all 4 extremities. Sensation intact.           [facility]  Hospitalist Division  Pager:  [phone]  Office:  [phone]

## 2018-03-29 NOTE — PERIOP NOTES
Dr. Padmini Ramos at bedside. Ordered Dilaudid PCA. D/C oral pain medication at this time. Will order when patient is appropriate to be off the PCA.

## 2018-03-30 LAB
BASOPHILS # BLD: 0 K/UL (ref 0–0.06)
BASOPHILS NFR BLD: 0 % (ref 0–2)
DIFFERENTIAL METHOD BLD: ABNORMAL
EOSINOPHIL # BLD: 0 K/UL (ref 0–0.4)
EOSINOPHIL NFR BLD: 0 % (ref 0–5)
ERYTHROCYTE [DISTWIDTH] IN BLOOD BY AUTOMATED COUNT: 15.5 % (ref 11.6–14.5)
HCT VFR BLD AUTO: 26.5 % (ref 35–45)
HGB BLD-MCNC: 8.7 G/DL (ref 12–16)
LYMPHOCYTES # BLD: 0.9 K/UL (ref 0.9–3.6)
LYMPHOCYTES NFR BLD: 8 % (ref 21–52)
MCH RBC QN AUTO: 27.9 PG (ref 24–34)
MCHC RBC AUTO-ENTMCNC: 32.8 G/DL (ref 31–37)
MCV RBC AUTO: 84.9 FL (ref 74–97)
MONOCYTES # BLD: 0.8 K/UL (ref 0.05–1.2)
MONOCYTES NFR BLD: 7 % (ref 3–10)
NEUTS SEG # BLD: 10.8 K/UL (ref 1.8–8)
NEUTS SEG NFR BLD: 85 % (ref 40–73)
PLATELET # BLD AUTO: 212 K/UL (ref 135–420)
PMV BLD AUTO: 8.1 FL (ref 9.2–11.8)
RBC # BLD AUTO: 3.12 M/UL (ref 4.2–5.3)
WBC # BLD AUTO: 12.5 K/UL (ref 4.6–13.2)

## 2018-03-30 PROCEDURE — 77030027138 HC INCENT SPIROMETER -A

## 2018-03-30 PROCEDURE — 77030020256 HC SOL INJ NACL 0.9%  500ML

## 2018-03-30 PROCEDURE — 74011250637 HC RX REV CODE- 250/637: Performed by: NEUROLOGICAL SURGERY

## 2018-03-30 PROCEDURE — 36415 COLL VENOUS BLD VENIPUNCTURE: CPT | Performed by: NEUROLOGICAL SURGERY

## 2018-03-30 PROCEDURE — 85025 COMPLETE CBC W/AUTO DIFF WBC: CPT | Performed by: NEUROLOGICAL SURGERY

## 2018-03-30 PROCEDURE — 77010033678 HC OXYGEN DAILY

## 2018-03-30 PROCEDURE — 65610000006 HC RM INTENSIVE CARE

## 2018-03-30 PROCEDURE — 74011250636 HC RX REV CODE- 250/636: Performed by: NEUROLOGICAL SURGERY

## 2018-03-30 RX ORDER — OXYCODONE HYDROCHLORIDE 5 MG/1
20 TABLET ORAL
Status: DISCONTINUED | OUTPATIENT
Start: 2018-03-30 | End: 2018-03-31

## 2018-03-30 RX ORDER — OXYCODONE HYDROCHLORIDE 5 MG/1
10 TABLET ORAL
Status: DISCONTINUED | OUTPATIENT
Start: 2018-03-30 | End: 2018-03-30

## 2018-03-30 RX ORDER — GABAPENTIN 300 MG/1
300 CAPSULE ORAL 3 TIMES DAILY
Status: DISCONTINUED | OUTPATIENT
Start: 2018-03-30 | End: 2018-04-01

## 2018-03-30 RX ORDER — SODIUM CHLORIDE 9 MG/ML
100 INJECTION, SOLUTION INTRAVENOUS CONTINUOUS
Status: DISCONTINUED | OUTPATIENT
Start: 2018-03-30 | End: 2018-04-08

## 2018-03-30 RX ADMIN — Medication 10 ML: at 05:05

## 2018-03-30 RX ADMIN — Medication 10 ML: at 13:52

## 2018-03-30 RX ADMIN — OXYCODONE HYDROCHLORIDE 20 MG: 5 TABLET ORAL at 16:24

## 2018-03-30 RX ADMIN — OXYCODONE HYDROCHLORIDE 20 MG: 5 TABLET ORAL at 11:58

## 2018-03-30 RX ADMIN — DULOXETINE HYDROCHLORIDE 60 MG: 60 CAPSULE, DELAYED RELEASE ORAL at 18:35

## 2018-03-30 RX ADMIN — OXYCODONE HYDROCHLORIDE 10 MG: 5 TABLET ORAL at 08:03

## 2018-03-30 RX ADMIN — CLINDAMYCIN PHOSPHATE 900 MG: 18 INJECTION, SOLUTION INTRAMUSCULAR; INTRAVENOUS at 05:04

## 2018-03-30 RX ADMIN — Medication 10 ML: at 22:10

## 2018-03-30 RX ADMIN — GABAPENTIN 300 MG: 300 CAPSULE ORAL at 22:09

## 2018-03-30 RX ADMIN — DEXAMETHASONE SODIUM PHOSPHATE 4 MG: 4 INJECTION, SOLUTION INTRAMUSCULAR; INTRAVENOUS at 05:04

## 2018-03-30 RX ADMIN — CLINDAMYCIN PHOSPHATE 900 MG: 18 INJECTION, SOLUTION INTRAMUSCULAR; INTRAVENOUS at 13:51

## 2018-03-30 RX ADMIN — SODIUM CHLORIDE 100 ML/HR: 900 INJECTION, SOLUTION INTRAVENOUS at 13:51

## 2018-03-30 RX ADMIN — MAGNESIUM HYDROXIDE 30 ML: 400 SUSPENSION ORAL at 09:29

## 2018-03-30 RX ADMIN — DEXAMETHASONE SODIUM PHOSPHATE 4 MG: 4 INJECTION, SOLUTION INTRAMUSCULAR; INTRAVENOUS at 13:51

## 2018-03-30 RX ADMIN — CYCLOBENZAPRINE HYDROCHLORIDE 10 MG: 10 TABLET, FILM COATED ORAL at 05:04

## 2018-03-30 RX ADMIN — OXYCODONE HYDROCHLORIDE 20 MG: 5 TABLET ORAL at 22:09

## 2018-03-30 RX ADMIN — DULOXETINE HYDROCHLORIDE 60 MG: 60 CAPSULE, DELAYED RELEASE ORAL at 09:29

## 2018-03-30 RX ADMIN — CYCLOBENZAPRINE HYDROCHLORIDE 10 MG: 10 TABLET, FILM COATED ORAL at 14:14

## 2018-03-30 RX ADMIN — Medication 10 ML: at 22:11

## 2018-03-30 RX ADMIN — METOPROLOL SUCCINATE 25 MG: 25 TABLET, EXTENDED RELEASE ORAL at 09:29

## 2018-03-30 RX ADMIN — GABAPENTIN 300 MG: 300 CAPSULE ORAL at 15:42

## 2018-03-30 NOTE — ACP (ADVANCE CARE PLANNING)
Patient has designated ____spouse____________________ to participate in his/her discharge plan and to receive any needed information.      Name: Sage Graham  Address: same as pt  Phone number: [phone]

## 2018-03-30 NOTE — PROGRESS NOTES
Medicine Progress Note    Patient: Rylan Roca   Age:  60 y.o.  DOA: 3/29/2018   Admit Dx / CC: thoracic lumbar radiculopathy   m54.16  m54.14  Radiculopathy, thoracic region  Lumbar radiculopathy  Acquired flat back syndrome  LOS:  LOS: 1 day     Assessment/Plan   Active Problems:    Lumbar radiculopathy (3/29/2018)      Radiculopathy, thoracic region (3/29/2018)      Acquired flat back syndrome (3/29/2018)        Additional Plan notes     Thoracic Lumbar Radiculopathy  - s/p exposure of previous T10-L3 fusion / removal of hardware L3 pedicle subtraction osteotomy instrumentaion ilium tto t10, bilateral dural repair  - IVF's  - Neuro checks   - decadron and clinda    HTN  - metoprolol      DISPO   Anticipated Date of Discharge: per primary  Anticipated Disposition (home, SNF) : Home vs snf    Subjective:   Patient seen and examined. Patient is tired but appears in NAD    Objective:     Visit Vitals    BP 96/53    Pulse 89    Temp 100.1 °F (37.8 °C)    Resp 14    Ht 5' 6\" (1.676 m)    Wt 58.4 kg (128 lb 12.8 oz)    SpO2 95%    BMI 20.79 kg/m2       Physical Exam:  General appearance: tired cooperative, no distress, appears stated age  Head: Normocephalic, without obvious abnormality, atraumatic  Neck: supple, trachea midline  Lungs: clear to auscultation bilaterally  Heart: regular rate and rhythm, S1, S2 normal, no murmur, click, rub or gallop  Abdomen: soft, non-tender.  Bowel sounds normal. No masses,  no organomegaly  Extremities: extremities normal, atraumatic, no cyanosis or edema  Skin: Skin color, texture, turgor normal. No rashes or lesions    Intake and Output:  Current Shift:  03/30 0701 - 03/30 1900  In: -   Out: 622 [Urine:362; Drains:260]  Last three shifts:  03/28 1901 - 03/30 0700  In: 5964.2 [P.O.:90; I.V.:5140]  Out: 0113 [Urine:955; Drains:795]    Lab/Data Reviewed:  CMP: No results found for: NA, K, CL, CO2, AGAP, GLU, BUN, CREA, GFRAA, GFRNA, CA, MG, PHOS, ALB, TBIL, TP, ALB, GLOB, AGRAT, SGOT, ALT, GPT  CBC:   Lab Results   Component Value Date/Time    WBC 12.5 03/30/2018 02:15 AM    HGB 8.7 (L) 03/30/2018 02:15 AM    HCT 26.5 (L) 03/30/2018 02:15 AM     03/30/2018 02:15 AM     All Cardiac Markers in the last 24 hours: No results found for: CPK, CK, CKMMB, CKMB, RCK3, CKMBT, CKNDX, CKND1, OMARI, TROPT, TROIQ, ROD, TROPT, TNIPOC, BNP, BNPP    Medications Reviewed:  Current Facility-Administered Medications   Medication Dose Route Frequency    oxyCODONE IR (ROXICODONE) tablet 20 mg  20 mg Oral Q4H PRN    gabapentin (NEURONTIN) capsule 300 mg  300 mg Oral TID    0.9% sodium chloride infusion  100 mL/hr IntraVENous CONTINUOUS    cyclobenzaprine (FLEXERIL) tablet 10 mg  10 mg Oral TID PRN    DULoxetine (CYMBALTA) capsule 60 mg  60 mg Oral BID    metoprolol succinate (TOPROL-XL) XL tablet 25 mg  25 mg Oral DAILY    sodium chloride (NS) flush 5-10 mL  5-10 mL IntraVENous Q8H    sodium chloride (NS) flush 5-10 mL  5-10 mL IntraVENous PRN    acetaminophen (TYLENOL) tablet 650 mg  650 mg Oral Q4H PRN    naloxone (NARCAN) injection 0.4 mg  0.4 mg IntraVENous PRN    ondansetron (ZOFRAN) injection 4 mg  4 mg IntraVENous Q4H PRN    phenol throat spray (CHLORASEPTIC) 1 Spray  1 Spray Oral PRN    magnesium hydroxide (MILK OF MAGNESIA) 400 mg/5 mL oral suspension 30 mL  30 mL Oral DAILY    sodium chloride (NS) flush 5-10 mL  5-10 mL IntraVENous Q8H    sodium chloride (NS) flush 5-10 mL  5-10 mL IntraVENous PRN    HYDROmorphone (PF) (DILAUDID) 30 mg / 30 mL PCA   IntraVENous CONTINUOUS    0.9% sodium chloride infusion 250 mL  250 mL IntraVENous PRN    clindamycin (CLEOCIN) 900mg D5W 50mL IVPB (premix)  900 mg IntraVENous Q8H    influenza vaccine 2017-18 (3 yrs+)(PF) (FLUZONE QUAD/FLUARIX QUAD) injection 0.5 mL  0.5 mL IntraMUSCular PRIOR TO DISCHARGE       Adolfo Estrada MD    March 30, 2018

## 2018-03-30 NOTE — PROGRESS NOTES
Neurosurgery Progress Note;post op day one  As expected pain is very difficult to control, reports diffuse burning, headache soreness diffusely  O; hct 26.5% after one unite of prbc  Vitals are stable. Drain gvinug897 and 55 over the last shift motor function in the lower extremities 5.5  A; doing as expected. P: observe in icu largely because of dose of narcotics being taken       Bedrest for now        Monitor hct.

## 2018-03-30 NOTE — PROGRESS NOTES
Problem: Falls - Risk of  Goal: *Absence of Falls  Document Ivania Fall Risk and appropriate interventions in the flowsheet.    Outcome: Progressing Towards Goal  Fall Risk Interventions:            Medication Interventions: Bed/chair exit alarm    Elimination Interventions: Bed/chair exit alarm

## 2018-03-30 NOTE — PROGRESS NOTES
attempted to conduct an initial consultation and spiritual assessment for Tonya Harrell, who is a 60 y. o.female. However, patient was asleep. Her  was at bedside, and, after introducing myself, I assured him of our prayers and of another visit attempt another time. Patients primary language is: [place]. According to the patients EMR, her Restorationist affiliation is: Non [employer].     The  provided the following interventions:  Initiated a relationship of care and support with patient's . Provided information about Spiritual Care Services. Offered silent prayer and, to the , assurance of continued prayers on patient's behalf. Reviewed chart. Plan:  Chaplains will continue our attempts to connect with patient and then provide pastoral care on an as-needed/requested basis.     [facility] Certified [address] [place] [address]    [phone]

## 2018-03-30 NOTE — CDMP QUERY
Please clarify if this patient is being treated/managed for:    =>Acute blood loss anemia post op  =>Other Explanation of clinical findings  =>Unable to Determine (no explanation of clinical findings)    The medical record reflects the following:    Risk:Extensive sx for Flat back syndrome. thoracolumbar scoliosis. EBL 1100 cc    Clinical Indicators: Preop H/H 10.9/35.8  Post op 3/29- 8.4/25.8  POD 1 3/30- 8.7/26.5    Treatment: 5000 mL of crystalloid, 375 mL of autologous BRAT blood, and 500 mL of albumin. Please clarify and document your clinical opinion in the progress notes and discharge.    Thank you,  [facility],[address], [address] [facility]

## 2018-03-30 NOTE — PROGRESS NOTES
1730 Patient flat in bed for duration of shift. Patient drowsy but opens eyes and follows commands when woken with voice. Patient  continues to experience pain in lower back, hips and sometimes \"burning pain\" in legs. Patient ARGUELLO to command and denies any numbness or tingling. Dilaudid PCA still infusing and  Oxycodone added to pain regimen at start of shift and dosage increased due to continuous pain. Patient is able to turn self in bed with minimal assistance (mostly protecting drains and lines.) Rep brought back brace and educated patient and her sister on how to use. Bedside and Verbal shift change report given to Soco Zabala RN (oncoming nurse) by Lucila Alexander RN   (offgoing nurse). Report included the following information SBAR, Kardex, Intake/Output, MAR and Cardiac Rhythm NS.

## 2018-03-30 NOTE — PROGRESS NOTES
Problem: Falls - Risk of  Goal: *Absence of Falls  Document Ivania Fall Risk and appropriate interventions in the flowsheet.    Outcome: Progressing Towards Goal  Fall Risk Interventions:            Medication Interventions: Bed/chair exit alarm    Elimination Interventions: Bed/chair exit alarm             Problem: Surgical Pathway Day of Surgery  Goal: *Optimal pain control at patient's stated goal  Outcome: Not Met  Pt with chronic, extreme pain

## 2018-03-30 NOTE — OP NOTES
[address] REPORT    Ana Arreguin  MR#: [id number]  : 1960  ACCOUNT #: [de-identified]   DATE OF SERVICE: 2018    PREOPERATIVE DIAGNOSES:  1. Sagittal plane deformity. 2.  Adjacent segment disease. 3.  Intractable back pain. POSTOPERATIVE DIAGNOSES:  1. Sagittal plane deformity. 2.  Adjacent segment disease. 3.  Intractable back pain. PROCEDURE:  1. Removal of T11 to L2 pedicle screw instrumentation. 2.  T10 to S1 pedicle screw instrumentation. 3.  Iliac bolt instrumentation. 4.  Exploration of prior fusion. 5.  L3 pedicle subtraction osteotomy. 6.  Repair of durotomy. 7.  L2 to L4 posterolateral fusion. 8.  Fluoroscopy. SURGEON:  HEBER KEY MD    CO-SURGEON:  Germania Avery MD    ASSISTANT:  Heber llanes and Chaparro Victoria. ANESTHESIA:  General endotracheal.    BLOOD LOSS:  650 mL. FLUIDS:  Please see Dr. Lino Colindres' dictation for fluids. COMPLICATIONS:  Dural dehisc. IMPLANTS:  Globus and Nuvasive. SPECIMEN REMOVED:  none. HISTORY OF PRESENT ILLNESS:  The patient is a 58-year-old female with a history of multiple prior fusions. The most recent fusion was T11 to L2. She has intractable back pain at the proximal junction and in the low back as well. Sagittally, she is out of balance. A decision was made to proceed with sagittal correction and extension above the proximal level. Of note, she did not halo the top screws and there was a proximal junctional kyphosis. She had pain with degenerative changes and responded partially to blocks at this level. PROCEDURE:  After informed consent was obtained, the patient was taken back to the operating room and placed under general anesthesia without event. She was given preoperative antibiotics and a Chavira catheter was placed. Impulse provided within the monitoring service and placed the electrodes. She was placed on a Hai table with the arms in the up position.   All pressure points were padded. Pillows were placed underneath her extended thighs and the knees were slightly flexed. Her back was prepped in the usual manner. The old incision was opened and extended slightly. A subperiosteal takedown was carried out and the old hardware was exposed. The area around the L3, from L2 to L4 was denuded of scar. She had solid fusion mass which was explored and she was solidly fused up through T11, down to S1 with large flowing bone masses. There was no potential to use more limited Smith-Montgomery osteotomies to correct and a decision was made to proceed with pedicle subtraction osteotomies. Holes were drilled for the screws at T10 and then from L4 to S1. The holes were drilled and then a gearshift was used to create the trajectories and then these paths were probed with no evidence of any breach. A 6.5 x 45 mm Globus CREO screws were used for this; 6.5 x 45 mm screws were placed at T10; 6.5 x 40 mm screws were placed at T11 and T12. At L1, a 5.5 x 50 mm screw was placed on the left and a 4.5 x 45 mm screw was placed on the right. At L2, a 6.5 x 45 mm screw was placed at L2 bilaterally. No screws were placed at L3; 6.5 x 50 mm screw was placed on the left at L4 and 6.5 x 45 mm screw was placed on the right; 6.5 x 40 mm screws were placed bilaterally at L5; and 7.5 x 45 mm screws were placed bilaterally at S1. There is excellent purchase at each of the screws and all screws stimulated satisfactorily. AP and lateral fluoroscopy showed good position and instrumentation. The  was slightly lateral on the left at T10. This was angled down. Again, it stimulated with good numbers and the AP then showed very satisfactory screw. Again, there was good purchase of all the screws. The posterior-superior iliac spine was then exposed and this was notched about a cm cephalad. A gearshift was used to create trajectories for the bolts and these were probed.   There was no evidence of any breach, 70 mm probes were chosen. These were placed and countersunk so that they did not protrude past the bone. Offset connectors were placed from the more primary in the wound through the muscle and into the heads. This was locked down with locking caps. The iliac bolts were NuVasive as were the offset connectors. AP and lateral fluoroscopy as well as pelvic inlet views showed good position of this instrumentation. There was excellent purchase. The dura was dissected away from the edge of the L3 and was exceedingly friable. In doing this, dissection for the PSO dural breaches occurred bilaterally. Dura was stripped away and then closed primarily. A small piece of Gelfoam was placed inside the dura to prevent snaring of the nerve roots. There was no evidence of any neural injury. These closed very nicely. At this point, the pedicles were drilled down and removed. The inner portion of vertebral body was decancellated and shaped in a wedge-type fashion. The lateral walls were removed. Provisional rods were placed. The anterior surface of the dura was completely dissected away without issue and then scissors were used to remove this. Lateral fluoroscopy was used to  the adequacy of the removal of the anterior vertebral body. At this point, montage was mixed and then placed on the anterior vertebral body defect. When this was done, the locking caps were slightly loosened and in a controlled manner, the osteotomy closed very nicely with good effect. There was good bony apposition bilaterally and nice closure. The new lei foramen was probed. There was no evidence of any impingement of the nerve roots. Free run EMGs were quiet. Floseal was applied and excess was irrigated away around the dura. DuraSeal was then applied bilaterally and over the top of the dura. Of note, after closure of the dura, even before the DuraSeal with Valsalva, there was no leak.   The wound was copiously irrigated with bacitracin laden irrigation. The bony edges were decorticated from L2 to L4 and Via Form and Troy graft material packed out on this area. Rods were sized, cut, and prebent. They were placed first in the iliac bolts and then  reduced all the way out. Over the last 2 levels, all of the lordosis was bent out so that there was no reductive pressure. Locking caps were placed and tightened a final time just by using the counter torque technique. ASF offset connectors from the [facility] set were used for jump rods bilaterally. These were placed and then the rods were placed. All caps were tightened a final tightness using the torque device. Multiple irrigations were performed throughout the procedure and one was performed just prior to placement of the graft. Vancomycin powder was sprayed over the wound and a deep drain was placed. The mid fascial layer was closed. We had to make a few fascial slits because of the tension on the fascia, but this nicely relaxed it and we were able to close in a watertight fashion with multiple figure-of-eight interrupted 0 Vicryl sutures. The subcutaneous layer was irrigated with bacitracin laden irrigation. Vancomycin powder was sprayed around this part of the wound. The wound was closed in a layered fashion and finally, a 3-0 Stratafix was used to close the skin in a subcuticular fashion. Prineo was applied and allowed to dry. The collin-incisional area was blocked with 0.25% Marcaine with epinephrine. Aspirations were performed before each injection to rule out intravascular placement, which there was none. The patient tolerated the procedure well. AP and lateral fluoroscopy showed good position of instrumentation and nice correction. Free run EMGs were quiet. SSEP and motor evokes were stable and present throughout the case. All screws stimulated satisfactorily.       MD JERMAN Lamas / SANDY  D: 03/29/2018 17:55     T: 03/30/2018 12:29  JOB #: [id number]

## 2018-03-30 NOTE — PROGRESS NOTES
Progress Note    Patient: Jolly Pinzon MRN: [id number]  SSN: [id number]    YOB: 1960  Age: 60 y.o. Sex: female      Admit Date: 3/29/2018    LOS: 1 day     Subjective:   Pain control an issue overnight. Roxicodone titration started this AM.  Had a headache overnight- minimal now. Denies radiating leg pain and weakness. Burning pain in back. Objective:     Vitals:    03/30/18 0600 03/30/18 0700 03/30/18 0800 03/30/18 0900   BP: 140/73 (!) 121/100 130/69 137/65   Pulse: (!) 105 (!) 108 (!) 123 97   Resp: 20 20 25 17   Temp:       SpO2: 97% 96% 98% 94%   Weight:       Height:            Intake and Output:  Current Shift: 03/30 0701 - 03/30 1900  In: -   Out: 140 [Urine:40; Drains:100]  Last three shifts: 03/28 1901 - 03/30 0700  In: 5964.2 [P.O.:90; I.V.:5140]  Out: 5806 [Urine:955; Drains:795]    Physical Exam:   Alert and appropriate. Motor 5/5 for all, sensory intact- no detectable motor deficit or sensory deficit. .  Wound c/d/i- flat. Thighs and calves non-tender to deep palpation. Lab/Data Review:  Recent Results (from the past 12 hour(s))   CBC WITH AUTOMATED DIFF    Collection Time: 03/30/18  2:15 AM   Result Value Ref Range    WBC 12.5 4.6 - 13.2 K/uL    RBC 3.12 (L) 4.20 - 5.30 M/uL    HGB 8.7 (L) 12.0 - 16.0 g/dL    HCT 26.5 (L) 35.0 - 45.0 %    MCV 84.9 74.0 - 97.0 FL    MCH 27.9 24.0 - 34.0 PG    MCHC 32.8 31.0 - 37.0 g/dL    RDW 15.5 (H) 11.6 - 14.5 %    PLATELET 982 393 - 634 K/uL    MPV 8.1 (L) 9.2 - 11.8 FL    NEUTROPHILS 85 (H) 40 - 73 %    LYMPHOCYTES 8 (L) 21 - 52 %    MONOCYTES 7 3 - 10 %    EOSINOPHILS 0 0 - 5 %    BASOPHILS 0 0 - 2 %    ABS. NEUTROPHILS 10.8 (H) 1.8 - 8.0 K/UL    ABS. LYMPHOCYTES 0.9 0.9 - 3.6 K/UL    ABS. MONOCYTES 0.8 0.05 - 1.2 K/UL    ABS. EOSINOPHILS 0.0 0.0 - 0.4 K/UL    ABS.  BASOPHILS 0.0 0.0 - 0.06 K/UL    DF AUTOMATED         Assessment:     Active Problems:    Lumbar radiculopathy (3/29/2018)      Radiculopathy, thoracic region (3/29/2018)      Acquired flat back syndrome (3/29/2018)        Plan:   1. Slowly titrate roxicodone to optimize pain control  2. Following drain outputs  3.   Following H/H  Signed By: Charles العراقي MD     March 30, 2018

## 2018-03-30 NOTE — PROGRESS NOTES
2014 - Pt arrived on unit. Orders, medications, labs, skin, and neuro exam reviewed. 46 - Dr. Garcia Links paged for pt complaints of pain unresolved by all other ordered methods. Order received for Roxicodone 10 mg tablet every 4 hours PRN. 8160 - Report given to Kim Latif RN. Orders, medications, labs, skin, and neuro exam reviewed.

## 2018-03-30 NOTE — MANAGEMENT PLAN
Shireen   Discharge Planning/ Assessment    Reasons for Intervention:   Care Management Interventions  PCP Verified by CM: Yes (Dr Rivera Box)  Last Visit to PCP: 01/28/18  Mode of Transport at Discharge: Other (see comment)  Transition of Care Consult (CM Consult): Discharge Planning  Current Support Network: Lives with Spouse, Own Home (2 stories)  Confirm Follow Up Transport: Family  Plan discussed with Pt/Family/Caregiver: Yes  Discharge Location  Discharge Placement: Home   Interviewed patient. Pt asked myself to speak with  due to pain. Verified demographics listed on face sheet wt; all information correct. Pt with [employer] Medicare. Patient stated their PCP is Dr Rivera Bxo and last appt 3 months ago. Sees Dr Yomaira Reyes for pain management. Also sees Dr Gemma Hoskins. Patient lives in 2 story home with spouse. Reports difficulty with stairs recently  Patient's NOK is spouse, Chicho Rushing at [phone]. Patient independent with ADLs prior to admission. No use of DME prior to admission. Discharge plan : Discussed Rehab, Outpt therapy, HH.  Pt/spouse would like to decide once ambulating  Care Management will continue to follow  Rupesh Pierce RN BSN  Outcomes Manager  Pager # [phone]    High Risk Criteria  [] Yes  [x]No   Physician Referral  [] Yes  []No        Date    Nursing Referral  [] Yes  []No        Date    Patient/Family Request  [] Yes  []No        Date       Resources:    Medicare  [] Yes  []No   Medicaid  [] Yes  []No   No Resources  [] Yes  []No   Private Insurance  [x] Yes  []No    Name/Phone Number    Other  [] Yes  []No        (i.e. Workman's Comp)         Prior Services:    Prior Services  [] Yes  [x]No   Home Health  [] Yes  []No   [address]  [] Yes  []No        Number of [address]  [] Yes  []No       Meals on Wheels  [] Yes  []No   Office on Aging  [] Yes  []No   Transportation Services  [] Yes  []No   Nursing Home  [] Yes  []No        Nursing Home Name    [facility]  [] Yes  []No        [address] Name    Other       Information Source:      Information obtained from  [x] Patient  [] Parent   [] Prosper Grumbling  [] Child  [] Spouse   [] Significant Other/Partner   [] Friend      [] EMS    [] Nursing Home Chart          [] Other:   Chart Review  [x] Yes  []No     Family/Support System:    Patient lives with  [] Alone    [x] Spouse   [] Significant Other  [] Children  [] Caretaker   [] Parent  [] Sibling     [] Other       Other Support System:    Is the patient responsible for care of others  [] Yes  [x]No   Information of person caring for patient on  discharge    Managers financial affairs independently  [x] Yes  []No   If no, explain:      Status Prior to Admission:    Mental Status  [x] Awake  [x] Alert  [x] Oriented  [] Quiet/Calm [] Lethargic/Sedated   [] Disoriented  [] Restless/Anxious  [] Combative   Personal Care  [] Dependent  [x] [address]  [] Requires Assistance   Meal Preparation Ability  [x] Independent   [] Standby Assistance   [] Minimal Assistance   [] Moderate Assistance  [] Maximum Assistance     [] Total Assistance   Chores  [x] Independent with Chores   [] N/A Nursing Home Resident   [] Requires Assistance   Bowel/Bladder  [x] Continent  [] Catheter  [] Incontinent  [] Ostomy Self-Care    [] Urine Diversion Self-Care  [] Maximum Assistance     [] Total Assistance   Number of Persons needed for assistance    DME at home  [] [address]cre, Valentino Ip  [] [address], Straight   [] Commode    [] Bathroom/Grab Bars  [] Hospital Bed  [] Nebulizer  [] Oxygen           [] Raised Toilet Seat  [] Shower Chair  [] Side Rails for Bed   [] Tub Transfer Bench   [] Ada Serdarrell  [] Eamon Blackmon      [] Other:   Vendor      Treatment Presently Receiving:    Current Treatments  [] Chemotherapy  [] Dialysis  [] Insulin  [] IVAB [x] IVF   [] O2  [x] PCA   [] PT   [] RT   [] Tube Feedings   [] Wound Care     Psychosocial Evaluation:    Verbalized Knowledge of Disease Process  [] Patient  []Family   Coping with Disease Process  [] Patient  []Family   Requires Further Counseling Coping with Disease Process  [] Patient  []Family     Identified Projected Needs:    [address]  [] Yes  []No   Transportation  [] Yes  []No   Education  [] Yes  []No        Specific Education     Financial Counseling  [] Yes  []No   Inability to Care for Self/Will Require 24 hour care  [] Yes  []No   Pain Management  [] Yes  []No   Home Infusion Therapy  [] Yes  []No   Oxygen Therapy  [] Yes  []No   DME  [] Yes  []No   Long Term Care Placement  [] Yes  []No   Rehab  [] Yes  []No   Physical Therapy  [x] Yes  []No   Needs Anticipated At This Time  [x] Yes  []No     Intra-Hospital Referral:    [facility]  [] Yes  []No     [] Yes  []No   Patient Representative  [] Yes  []No   Staff for Teaching Needs  [] Yes  []No   Specialty Teaching Needs     Diabetic Educator  [] Yes  []No   Referral for Diabetic Educator Needed  [] Yes  []No  If Yes, place order for Nutritionist or Diabetic Consult     Tentative Discharge Plan:    Home with No Services  [] Yes  [x]No   Home with Home Health Follow-up  [] Yes  []No        If Yes, specify type    Home Care Program  [x] Yes  []No        If Yes, specify type tbd   Meals on Wheels  [] Yes  []No   Office of Aging  [] Yes  []No   NHP  [] Yes  []No   Return to the Nursing Home  [] Yes  []No   Rehab Therapy  [] Yes  []No   Acute Rehab  [] Yes  []No   Subacute Rehab  [] Yes  []No   Private Care  [] Yes  []No   Substance Abuse Referral  [] Yes  []No   Transportation  [] Yes  []No   Chore Service  [] Yes  []No   Inpatient Hospice  [] Yes  []No   OP RT  [] Yes  [] No   OP Hemo  [] Yes  [] No   OP PT  [] Yes  []No   Support Group  [] Yes  []No   Reach to Recovery  [] Yes  []No   OP Oncology Clinic  [] Yes  []No   Clinic Appointment  [] Yes  []No   DME  [] Yes  []No   Comments    Name of D/C Planner or  Given to Patient or Family Silvia Alegria RN BSN  Outcomes Manager  Pager # [phone]   Phone Number         Extension    Date 3/30/2018   Time 0915   If you are discharged home, whom do you designate to participate in your discharge plan and receive any information needed?      Enter name of designee spouse        Phone # of designee         Address of designee         Updated         Patient refused to designate any           individual

## 2018-03-30 NOTE — ANESTHESIA POSTPROCEDURE EVALUATION
Post-Anesthesia Evaluation and Assessment    Patient: Aleksandar Valverde MRN: [id number]  SSN: [id number]    YOB: 1960  Age: 60 y.o. Sex: female       Cardiovascular Function/Vital Signs  Visit Vitals    /81    Pulse (!) 103    Temp 37.1 °C (98.8 °F)    Resp 19    Ht 5' 6\" (1.676 m)    Wt 58.4 kg (128 lb 12.8 oz)    SpO2 100%    BMI 20.79 kg/m2       Patient is status post general anesthesia for Procedure(s):  exposure of previous t10 - l3 fusion/ removal of hardware l3 pedicle subtraction osteotomy instrumentation ilium tto t10, bilateral duralrepair  instrumentation ilium to possible t4. Nausea/Vomiting: None    Postoperative hydration reviewed and adequate. Pain:  Pain Scale 1: Numeric (0 - 10) (03/29/18 2100)  Pain Intensity 1: 10 (03/29/18 2100)   Managed    Neurological Status:   Neuro (WDL): Within Defined Limits (03/29/18 1639)  Neuro  Neurologic State: Eyes open spontaneously (03/29/18 2014)  Orientation Level: Oriented X4 (03/29/18 2014)  Cognition: Follows commands (03/29/18 2014)  Speech: Tavo Keen (03/29/18 2014)  Assessment L Pupil: Brisk;Round (03/29/18 2100)  Size L Pupil (mm): 4 (03/29/18 2100)  Assessment R Pupil: Brisk;Round (03/29/18 2100)  Size R Pupil (mm): 4 (03/29/18 2100)  LUE Motor Response: Purposeful;Spontaneous  (03/29/18 2014)  LLE Motor Response: Purposeful;Spontaneous  (03/29/18 2014)  RUE Motor Response: Purposeful;Spontaneous  (03/29/18 2014)  RLE Motor Response: Purposeful;Spontaneous  (03/29/18 2014)   At baseline    Mental Status and Level of Consciousness: Arousable    Pulmonary Status:   O2 Device: Nasal cannula (03/29/18 1750)   Adequate oxygenation and airway patent    Complications related to anesthesia: None    Post-anesthesia assessment completed.  No concerns    Signed By: Radhika Singletary MD     March 29, 2018

## 2018-03-30 NOTE — OP NOTES
[address] REPORT    Lulu Kinney  MR#: [id number]  : 1960  ACCOUNT #: [de-identified]   DATE OF SERVICE: 2018    PREOPERATIVE DIAGNOSIS:  Flat back syndrome. thoracolumbar scoliosis. POSTOPERATIVE DIAGNOSIS:  Flat back syndrome. thoracolumbar scoliosis. SURGICAL PROCEDURES  1. Exposure of previous T11-L2 instrumented spinal fusion with removal of hardware and exploration of fusion. 2.  Pedicle subtraction osteotomy, L3 pedicle. 3.  L2 to L4 fusion after pedicle subtraction osteotomy. 4.  Intervertebral body and posterolateral fusion, L3.  5.  Spinal instrumentation. 6.  Intraoperative neuromonitoring (SSEP, motor evoked potentials ,and running continuous EMGs). SURGEONS:  Feng Knowles MD and Kyler Ocampo MD    ASSISTANT:  Paulo Millard. ANESTHESIA:  General.    BLOOD LOSS:  Estimated to be 1100 mL. SPECIMENS REMOVED: none    FLUID REPLACEMENT:  5000 mL of crystalloid, 375 mL of autologous BRAT blood, and 500 mL of albumin. COMPLICATIONS:  None. IMPLANTS: see below    DRAINS:  Epidural and subgaleal, cutaneous. HISTORY:  The patient is an unfortunate 62-year-old female who presents with a long history of chronic low back pain over the years. I had operated on her on a number of different circumstances for a number of different things. With time, she has also developed a cervical myelopathy, which approximately 2 months ago, she underwent surgical treatment for this. Continuing to suffer from pain, taking 60 mg of extended oxycodone a day, different treatment options were discussed and the patient opted to proceed with surgery. DESCRIPTION OF PROCEDURE:  The patient was brought into the hospital, prepared and brought to the operating room where she was anesthetized and intubated. Chavira and cannulas were placed. We placed the electrodes for the extremity EMG and the SSEPs and motor evoked potentials.   The patient was then very gently turned onto the open portion of the [facility] table and was positioned. A wide field that started at the lower cervical spine and extended down to the distal sacrum was prepared. Patient was given Decadron and antibiotics at induction. A timeout was done. The films were in the room. The initial skin incision ran from the spinous process of T9 to the sacrum on the midline. Deepened with the Bovie knife, we reflected the muscles off the spine. The spine was exposed from T10 down to the sacrum. We initially resected the upper portion of the scar at the L3 laminotomy defect in anticipation of the pedicle subtraction osteotomies. We had encountered bilaterally, a very atretic dura which had to be repaired primarily without difficulty. The patient had her spine exposed. We irrigated frequently throughout the case. PROCEDURE  PEDICLE SUBTRACTION OSTEOTOMY:  Having completed the exposure and repair of the atretic dura bilaterally, attention was turned to the pedicles. We were able to mobilize the anterior dura at the level of the L3 pedicles. The patient had had a vigorous posterolateral fusion mass and we were able to drill through this bilaterally. The tools for doing the pedicle subtraction initially involved corkscrew types of gouges which were done. Following this, we then were able to further thin the planned subtraction area with the drill. Edges were very carefully controlled and monitored in terms of bony removal to both allow adequate neural decompression, but also to allow bony apposition. It should be noted that we had placed the screws before and placed temporary rods prior to completing the osteotomy. We had the [facility] form [place] type of putty that was going to be used in the construct within the vertebral body and this was prepared. Having mobilized the dura, the final impaction was utilized and we were able to drive a fair amount of bone deep into the created space.   The temporary radha was then allowed to relax and with really minimal additional compression, the spine relaxed on itself and we had beautiful closure. Following the closure, we then palpated the common foramen, that is the L2-L4 foramen was nicely patent for both nerves, bilaterally. The area of the bony apposition was further decorticated and lateral to this as well and this was fused as well. It should be noted that prior to the pedicle subtraction, we placed pedicle screws utilizing lateral fluoroscopy and anatomic landmarks. We placed pedicle screws in T10, replaced pedicle screws in T11, T12, L1 and L2. We put new screws into L4-L5 and S1 and put an iliac screw which measured 8.5 x 70 into the ileum. The radha was then carefully constructed to fit the tulip heads utilizing the radha benders for in situ bending. The set screws were then applied without difficulty. Iliac bolts were secured as well. We were able to complete the construct. We irrigated with antibiotic-containing saline. We did place a drain in the submuscular area and brought it out through a superior stab incision. We placed a second drain in a subgaleal type of position. The muscle was closed with interrupted 0 Vicryl, fascia was closed with 0 Vicryl, deep subcutaneous was closed with 0 Vicryl, superficial subcutaneous was closed with 2-0 Vicryl and the skin was closed with running subcuticular 3-0 Monocryl and Dermabond type dressing. Both drains were secured. Intraoperatively, we had electrophysiologic testing and we tested SSEPs, motor evoked potentials and running continuous EMG with certain manipulations of the nerves. We did get some transient responses and they were at times sustained but nothing was permanent at the end of the procedure, we ran all of our potentials and readings remained stable to baseline. Also, we had used the [employer] and did give the patient back at 375 mL of her own blood.   We repeated the Decadron. She had received Cleocin at induction. Subsequently, the patient was returned to the operating room stretcher where she was allowed to awake and extubate without trouble and was taken to the recovery room tolerating her procedure quite well. All counts were correct and there were no apparent complications.       MD HANNAH Cannon / Kat Lepe  D: 03/29/2018 17:25     T: 03/30/2018 12:06  JOB #: [id number]

## 2018-03-31 LAB
ANION GAP SERPL CALC-SCNC: 4 MMOL/L (ref 3–18)
BACTERIA SPEC CULT: ABNORMAL
BUN SERPL-MCNC: 14 MG/DL (ref 7–18)
BUN/CREAT SERPL: 30 (ref 12–20)
CA-I SERPL-SCNC: 1.2 MMOL/L (ref 1.12–1.32)
CALCIUM SERPL-MCNC: 7.4 MG/DL (ref 8.5–10.1)
CHLORIDE SERPL-SCNC: 108 MMOL/L (ref 100–108)
CO2 SERPL-SCNC: 30 MMOL/L (ref 21–32)
CREAT SERPL-MCNC: 0.46 MG/DL (ref 0.6–1.3)
ERYTHROCYTE [DISTWIDTH] IN BLOOD BY AUTOMATED COUNT: 15.4 % (ref 11.6–14.5)
GLUCOSE SERPL-MCNC: 97 MG/DL (ref 74–99)
HCT VFR BLD AUTO: 19.4 % (ref 35–45)
HCT VFR BLD AUTO: 23.4 % (ref 35–45)
HGB BLD-MCNC: 6.2 G/DL (ref 12–16)
HGB BLD-MCNC: 7.7 G/DL (ref 12–16)
MAGNESIUM SERPL-MCNC: 2.2 MG/DL (ref 1.6–2.6)
MCH RBC QN AUTO: 29.1 PG (ref 24–34)
MCHC RBC AUTO-ENTMCNC: 32.9 G/DL (ref 31–37)
MCV RBC AUTO: 88.3 FL (ref 74–97)
PHOSPHATE SERPL-MCNC: 2.1 MG/DL (ref 2.5–4.9)
PLATELET # BLD AUTO: 118 K/UL (ref 135–420)
PMV BLD AUTO: 9.6 FL (ref 9.2–11.8)
POTASSIUM SERPL-SCNC: 3.8 MMOL/L (ref 3.5–5.5)
RBC # BLD AUTO: 2.65 M/UL (ref 4.2–5.3)
SERVICE CMNT-IMP: ABNORMAL
SODIUM SERPL-SCNC: 142 MMOL/L (ref 136–145)
WBC # BLD AUTO: 7.4 K/UL (ref 4.6–13.2)

## 2018-03-31 PROCEDURE — 74011250636 HC RX REV CODE- 250/636: Performed by: NEUROLOGICAL SURGERY

## 2018-03-31 PROCEDURE — 36569 INSJ PICC 5 YR+ W/O IMAGING: CPT | Performed by: PHYSICIAN ASSISTANT

## 2018-03-31 PROCEDURE — 77010033678 HC OXYGEN DAILY

## 2018-03-31 PROCEDURE — 82330 ASSAY OF CALCIUM: CPT | Performed by: PHYSICIAN ASSISTANT

## 2018-03-31 PROCEDURE — 85027 COMPLETE CBC AUTOMATED: CPT | Performed by: PHYSICIAN ASSISTANT

## 2018-03-31 PROCEDURE — 36415 COLL VENOUS BLD VENIPUNCTURE: CPT | Performed by: NEUROLOGICAL SURGERY

## 2018-03-31 PROCEDURE — 76937 US GUIDE VASCULAR ACCESS: CPT | Performed by: PHYSICIAN ASSISTANT

## 2018-03-31 PROCEDURE — 36430 TRANSFUSION BLD/BLD COMPNT: CPT

## 2018-03-31 PROCEDURE — C1751 CATH, INF, PER/CENT/MIDLINE: HCPCS

## 2018-03-31 PROCEDURE — P9016 RBC LEUKOCYTES REDUCED: HCPCS | Performed by: NEUROLOGICAL SURGERY

## 2018-03-31 PROCEDURE — 85018 HEMOGLOBIN: CPT | Performed by: NEUROLOGICAL SURGERY

## 2018-03-31 PROCEDURE — 84100 ASSAY OF PHOSPHORUS: CPT | Performed by: PHYSICIAN ASSISTANT

## 2018-03-31 PROCEDURE — 74011000250 HC RX REV CODE- 250: Performed by: PHYSICIAN ASSISTANT

## 2018-03-31 PROCEDURE — 80048 BASIC METABOLIC PNL TOTAL CA: CPT | Performed by: PHYSICIAN ASSISTANT

## 2018-03-31 PROCEDURE — 83735 ASSAY OF MAGNESIUM: CPT | Performed by: PHYSICIAN ASSISTANT

## 2018-03-31 PROCEDURE — 74011250637 HC RX REV CODE- 250/637: Performed by: NEUROLOGICAL SURGERY

## 2018-03-31 PROCEDURE — 02HV33Z INSERTION OF INFUSION DEVICE INTO SUPERIOR VENA CAVA, PERCUTANEOUS APPROACH: ICD-10-PCS | Performed by: PHYSICIAN ASSISTANT

## 2018-03-31 PROCEDURE — 30233N1 TRANSFUSION OF NONAUTOLOGOUS RED BLOOD CELLS INTO PERIPHERAL VEIN, PERCUTANEOUS APPROACH: ICD-10-PCS | Performed by: NEUROLOGICAL SURGERY

## 2018-03-31 PROCEDURE — 74011250637 HC RX REV CODE- 250/637: Performed by: PHYSICIAN ASSISTANT

## 2018-03-31 PROCEDURE — 77030018786 HC NDL GD F/USND BARD -B

## 2018-03-31 PROCEDURE — 65610000006 HC RM INTENSIVE CARE

## 2018-03-31 RX ORDER — CIPROFLOXACIN 500 MG/1
500 TABLET ORAL EVERY 12 HOURS
Status: COMPLETED | OUTPATIENT
Start: 2018-03-31 | End: 2018-04-03

## 2018-03-31 RX ORDER — NOREPINEPHRINE BITARTRATE/D5W 8 MG/250ML
2-16 PLASTIC BAG, INJECTION (ML) INTRAVENOUS
Status: DISCONTINUED | OUTPATIENT
Start: 2018-03-31 | End: 2018-04-01 | Stop reason: CLARIF

## 2018-03-31 RX ORDER — SODIUM CHLORIDE 0.9 % (FLUSH) 0.9 %
20 SYRINGE (ML) INJECTION EVERY 24 HOURS
Status: DISCONTINUED | OUTPATIENT
Start: 2018-03-31 | End: 2018-04-10 | Stop reason: HOSPADM

## 2018-03-31 RX ORDER — SODIUM CHLORIDE 0.9 % (FLUSH) 0.9 %
10-40 SYRINGE (ML) INJECTION EVERY 8 HOURS
Status: DISCONTINUED | OUTPATIENT
Start: 2018-03-31 | End: 2018-04-10 | Stop reason: HOSPADM

## 2018-03-31 RX ORDER — POTASSIUM CHLORIDE 29.8 MG/ML
20 INJECTION INTRAVENOUS ONCE
Status: COMPLETED | OUTPATIENT
Start: 2018-03-31 | End: 2018-04-06

## 2018-03-31 RX ORDER — SODIUM CHLORIDE 0.9 % (FLUSH) 0.9 %
10-30 SYRINGE (ML) INJECTION AS NEEDED
Status: DISCONTINUED | OUTPATIENT
Start: 2018-03-31 | End: 2018-04-10 | Stop reason: HOSPADM

## 2018-03-31 RX ORDER — SODIUM,POTASSIUM PHOSPHATES 280-250MG
2 POWDER IN PACKET (EA) ORAL
Status: COMPLETED | OUTPATIENT
Start: 2018-03-31 | End: 2018-04-01

## 2018-03-31 RX ORDER — KETOROLAC TROMETHAMINE 30 MG/ML
30 INJECTION, SOLUTION INTRAMUSCULAR; INTRAVENOUS
Status: DISPENSED | OUTPATIENT
Start: 2018-03-31 | End: 2018-04-05

## 2018-03-31 RX ORDER — SODIUM CHLORIDE 0.9 % (FLUSH) 0.9 %
10 SYRINGE (ML) INJECTION AS NEEDED
Status: DISCONTINUED | OUTPATIENT
Start: 2018-03-31 | End: 2018-04-10 | Stop reason: HOSPADM

## 2018-03-31 RX ORDER — SODIUM CHLORIDE 9 MG/ML
250 INJECTION, SOLUTION INTRAVENOUS AS NEEDED
Status: DISCONTINUED | OUTPATIENT
Start: 2018-03-31 | End: 2018-04-10 | Stop reason: HOSPADM

## 2018-03-31 RX ORDER — BACITRACIN 500 UNIT/G
1 PACKET (EA) TOPICAL AS NEEDED
Status: DISCONTINUED | OUTPATIENT
Start: 2018-03-31 | End: 2018-04-10 | Stop reason: HOSPADM

## 2018-03-31 RX ORDER — OXYCODONE HYDROCHLORIDE 5 MG/1
30 TABLET ORAL
Status: DISCONTINUED | OUTPATIENT
Start: 2018-03-31 | End: 2018-04-03

## 2018-03-31 RX ORDER — SODIUM CHLORIDE 0.9 % (FLUSH) 0.9 %
10 SYRINGE (ML) INJECTION EVERY 24 HOURS
Status: DISCONTINUED | OUTPATIENT
Start: 2018-03-31 | End: 2018-04-10 | Stop reason: HOSPADM

## 2018-03-31 RX ADMIN — Medication 10 ML: at 18:07

## 2018-03-31 RX ADMIN — OXYCODONE HYDROCHLORIDE 20 MG: 5 TABLET ORAL at 08:10

## 2018-03-31 RX ADMIN — POTASSIUM & SODIUM PHOSPHATES POWDER PACK 280-160-250 MG 2 PACKET: 280-160-250 PACK at 21:12

## 2018-03-31 RX ADMIN — GABAPENTIN 300 MG: 300 CAPSULE ORAL at 17:49

## 2018-03-31 RX ADMIN — Medication 20 ML: at 18:07

## 2018-03-31 RX ADMIN — OXYCODONE HYDROCHLORIDE 30 MG: 5 TABLET ORAL at 12:18

## 2018-03-31 RX ADMIN — CYCLOBENZAPRINE HYDROCHLORIDE 10 MG: 10 TABLET, FILM COATED ORAL at 06:52

## 2018-03-31 RX ADMIN — GABAPENTIN 300 MG: 300 CAPSULE ORAL at 22:00

## 2018-03-31 RX ADMIN — MAGNESIUM HYDROXIDE 30 ML: 400 SUSPENSION ORAL at 08:11

## 2018-03-31 RX ADMIN — OXYCODONE HYDROCHLORIDE 20 MG: 5 TABLET ORAL at 04:19

## 2018-03-31 RX ADMIN — OXYCODONE HYDROCHLORIDE 30 MG: 5 TABLET ORAL at 22:00

## 2018-03-31 RX ADMIN — Medication 10 ML: at 22:03

## 2018-03-31 RX ADMIN — DULOXETINE HYDROCHLORIDE 60 MG: 60 CAPSULE, DELAYED RELEASE ORAL at 08:11

## 2018-03-31 RX ADMIN — POTASSIUM & SODIUM PHOSPHATES POWDER PACK 280-160-250 MG 2 PACKET: 280-160-250 PACK at 22:00

## 2018-03-31 RX ADMIN — CYCLOBENZAPRINE HYDROCHLORIDE 10 MG: 10 TABLET, FILM COATED ORAL at 20:02

## 2018-03-31 RX ADMIN — POTASSIUM CHLORIDE 20 MEQ: 400 INJECTION, SOLUTION INTRAVENOUS at 21:12

## 2018-03-31 RX ADMIN — CYCLOBENZAPRINE HYDROCHLORIDE 10 MG: 10 TABLET, FILM COATED ORAL at 13:57

## 2018-03-31 RX ADMIN — OXYCODONE HYDROCHLORIDE 30 MG: 5 TABLET ORAL at 17:49

## 2018-03-31 RX ADMIN — DULOXETINE HYDROCHLORIDE 60 MG: 60 CAPSULE, DELAYED RELEASE ORAL at 17:49

## 2018-03-31 RX ADMIN — OXYCODONE HYDROCHLORIDE 20 MG: 5 TABLET ORAL at 00:51

## 2018-03-31 RX ADMIN — CIPROFLOXACIN HYDROCHLORIDE 500 MG: 500 TABLET, FILM COATED ORAL at 22:00

## 2018-03-31 RX ADMIN — KETOROLAC TROMETHAMINE 30 MG: 30 INJECTION, SOLUTION INTRAMUSCULAR at 16:03

## 2018-03-31 RX ADMIN — Medication 4 MCG/MIN: at 18:06

## 2018-03-31 RX ADMIN — GABAPENTIN 300 MG: 300 CAPSULE ORAL at 08:11

## 2018-03-31 NOTE — PROGRESS NOTES
[facility]  03/30/18 03/30 1901 - 03/31 0700  In: 1200 [I.V.:1200]  Out: 505 [Urine:435; Drains:70]    Last 3 Recorded Weights in this Encounter    03/28/18 1334 03/29/18 0655   Weight: 56.7 kg (125 lb) 58.4 kg (128 lb 12.8 oz)       Overnight Shift Events:  1930:   · Bedside and verbal shift change report received from Anthony Escamilla, off-going RN. Provider's instructions/meds/plan for current shift reviewed with pt by this writer; ongoing education rendered at this time. Rate verify on IV fluids/gtts. · Dual RN bedside neuro exam completed. · Pt lying in bed, call bell within reach, bed in lowest position, side rails engaged x3 for pt safety; will continue to monitor pt.    2209:   · PRN medication administered for pt c/o pain. See eMAR & flowsheets. 0051:   · PRN medication administered for pt c/o pain. See eMAR & flowsheets. 0419:   · PRN medication administered for pt c/o pain. See eMAR & flowsheets. 2070:   · PRN medication administered as ordered for muscle spasms. 0730:   · Bedside and Verbal shift change report given to Alex Gomez RN (oncoming nurse) by Duong Goss RN(offgoing nurse). Report included the following information SBAR, Kardex, Procedure Summary, Intake/Output, MAR and Recent Results. Normal Sinus Rhythm. Date 03/30/18 0700 - 03/31/18 0659 03/31/18 0700 - 04/01/18 0659   Shift 8358-3905 3787-5957 24 Hour Total 0700-1859 1900-0659 24 Hour Total   I  N  T  A  K  E   P.O. 120  120         P. O. 120  120       I.V.  (mL/kg/hr) 315  (0.4) 1200 1515         Volume (0.9% sodium chloride infusion) 315 1200 1515       Other  0 0         Irrigation Volume Input (mL) (Urinary Catheter 03/29/18 Chavira)  0 0       Shift Total  (mL/kg) 435  (7.4) 1200  (20.5) 1635  (28)      O  U  T  P  U  T   Urine  (mL/kg/hr) 922  (1.3) 435 1357         Urine Output (mL) (Urinary Catheter 03/29/18 Chavira)        Drains 370 70 440         Output (ml) (Hemovac Left Back) 295 60 355         Output (ml) (Hemovac Right Back) 75 10 85       Shift Total  (mL/kg) 1292  (22.1) 505  (8.6) 1797  (30.8)      NET -857 695 -162      Weight (kg) 58.4 58.4 58.4 58.4 58.4 58.4

## 2018-03-31 NOTE — CONSULTS
[facility]  Pulmonary, Critical Care, and Sleep Medicine      Name: Nannette Vasquez MRN: [id number]   : 1960 Hospital: [address] [place] [address]   Date: 3/31/2018          Critical Care Initial Patient Consult    Requesting MD:  Dr. Juana Rivera                                                Reason for CC Consult: hypotension    IMPRESSION:   · Chronic back pain, lumbar radiculopathy, flat back syndrome s/p prolonged back surgery 3/29   · Acute blood loss anemia in setting of above surgery-hgb 6.2-receiving 2uPRBC  · Hypotension due to above as well as pain medication  · E. Faecalis UTI  · Hx palpitations      RECOMMENDATIONS:   · Transfusion continuing as ordered-currently finishing second unit () at this time. Repeat CBC after transfusion completed. · Continue with IVF maintenance NS @100cc/hr, s/p 500cc bolus  · Place PICC, start levophed if needed for MAP>60-65  · Wean O2 as tolerated for sat>94%  · Add incentive spirometer and mobilize as per neurosurgery, baseline CXR if able  · Check BMP, Mg, phos, Ca  · Holding Toprol for now, pt asymptomatic without palpitations, no tachycardia or arrhythmia noted  · Chavira in place per now as per NS, remove as soon as able  · S/p post op course clinda, completed 3/30  · Urine cx noted 30K E. Faecalis-will tx course cipro d/w pharmacy  · Continue with pain control-currently on dilaudid PCA, oxycodone 30q4 prn and toradol added  · Continue home doses neurontin and cymbalta, flexeril prn  · Other management per primary hospitalist team and neurosurgery   · Prophylaxis - DVT-SCD  · FULL CODE     Subjective/History: This patient has been seen and evaluated at the request of Dr. Juana Rivera for hypotension. Patient is a 60 y.o. female   With PMH chronic back pain and multiple back surgeries, palpitations on toprol, admitted for elective back surgery.  She is s/p exposure of previous T10-L3 fusion, removal of hardware L3 pedicle subtraction osteotomy, instrumentation ilium to T10, bilateral dural repair on 3/29/18. EBL was 1100 and she was given 5L crystalloid, 375 autologous blood, and 500mL albumin intraoperatively. Pain has been difficult to control postoperatively and she is currently on PCA dilaudid, oxycodone 30mg q4 prn and started on toradol as well. On 3/31, hgb was noted to be 6.2 and she was ordered for [facility]. Her SBP has been in the 's until this morning when she began having SBP in the 70's with MAP in the 50's. She was bolused 500ccNS without significant response. She has had good UO. Drains were removed this am. There is no WBC or BMP today. She had a temp of 99 this am but has been afebrile. Neurologically, she has had no significant changes. She is chronically on flexeril, cymbalta, oxycodone, neurontin. Her  at bedside states that she had similar hypotension after her last surgery in January 2018 and pt states her BP often \"runs low. \" Last dose toprol was yesterday morning. She received her first dose oxycodone 30mg at noon. Her SBP is currently in the 90's. She is asymptomatic other than back pain. She denies headache,chest pain , SOB, palpitations, n/v/d, fevers or chills, cough, dizziness, lightheadedness. O2 sat is 97% on 2L nc, HR in 80's, RR 15-18. Repeat labs post transfusion are ordered. PICC has been placed if she requires any pressor support.     Past Medical History:   Diagnosis Date    Acute sinusitis     Adverse effect of anesthesia     chronic pain patient with difficulty controlling pain postoperatively    Arrhythmia     palpatation    Chronic pain     low back    DDD (degenerative disc disease)     Heart palpitations     Neuropathy     Osteoarthritis of left hip 2/4/2016    Osteoarthritis of right hip 8/30/2015      Past Surgical History:   Procedure Laterality Date   [facility] SURGERY  1985,1987,2002,2005 ,,[id number]    x8    HX BUNIONECTOMY Left 2011    HX DILATION AND CURETTAGE      HX ORTHOPAEDIC [id number]    right hip replacement    TOTAL HIP ARTHROPLASTY  2016    left hip      Prior to Admission medications    Medication Sig Start Date End Date Taking? Authorizing Provider   cyclobenzaprine (FLEXERIL) 10 mg tablet Take 10 mg by mouth three (3) times daily as needed. Indications: MUSCLE SPASM   Yes Historical Provider   DULoxetine (CYMBALTA) 60 mg capsule Take 60 mg by mouth two (2) times a day. Indications: NEUROPATHIC PAIN   Yes Historical Provider   metoprolol-XL (TOPROL XL) 25 mg XL tablet Take 25 mg by mouth daily. Indications: Heart palpitation   Yes Historical Provider   oxyCODONE IR (ROXICODONE) 20 mg immediate release tablet Take 1 Tab by mouth every three (3) hours as needed for Pain. Max Daily Amount: 160 mg. Patient taking differently: Take 30 mg by mouth every four (4) hours. 2/10/16   Yuri Mccarty MD     Current Facility-Administered Medications   Medication Dose Route Frequency    NOREPINephrine (LEVOPHED) 8 mg in 5% dextrose 250mL infusion  2-16 mcg/min IntraVENous TITRATE    gabapentin (NEURONTIN) capsule 300 mg  300 mg Oral TID    0.9% sodium chloride infusion  100 mL/hr IntraVENous CONTINUOUS    DULoxetine (CYMBALTA) capsule 60 mg  60 mg Oral BID    sodium chloride (NS) flush 5-10 mL  5-10 mL IntraVENous Q8H    magnesium hydroxide (MILK OF MAGNESIA) 400 mg/5 mL oral suspension 30 mL  30 mL Oral DAILY    sodium chloride (NS) flush 5-10 mL  5-10 mL IntraVENous Q8H    HYDROmorphone (PF) (DILAUDID) 30 mg / 30 mL PCA   IntraVENous CONTINUOUS    influenza vaccine 2017-18 (3 yrs+)(PF) (FLUZONE QUAD/FLUARIX QUAD) injection 0.5 mL  0.5 mL IntraMUSCular PRIOR TO DISCHARGE     Allergies   Allergen Reactions    Pcn [Penicillins] Rash     As a child. Social History   Substance Use Topics    Smoking status: Never Smoker    Smokeless tobacco: Never Used    Alcohol use No      History reviewed. No pertinent family history.      Review of Systems:  A comprehensive review of systems was negative except for that written in the HPI. Objective:   Vital Signs:    Visit Vitals    /58    Pulse 73    Temp 97.9 °F (36.6 °C)    Resp 15    Ht 5' 6\" (1.676 m)    Wt 58.4 kg (128 lb 12.8 oz)    SpO2 100%    BMI 20.79 kg/m2       O2 Device: Nasal cannula   O2 Flow Rate (L/min): 2 l/min   Temp (24hrs), Av.6 °F (37 °C), Min:97.9 °F (36.6 °C), Max:99 °F (37.2 °C)       Intake/Output:   Last shift:       07 -  190  In: -   Out: 230 [Urine:230]  Last 3 shifts: 1901 -  0700  In: 2324.2 [P.O.:210;  I.V.:1755]  Out: 9613 [Urine:1767; Drains:960]    Intake/Output Summary (Last 24 hours) at 18 1523  Last data filed at 18 0800   Gross per 24 hour   Intake             1500 ml   Output             1355 ml   Net              145 ml       Physical Exam:  General/Neuro: Awake and alert, oriented, appropriately conversant, appears uncomfortable in bed, on L side, able to move all extremities, sensation intact  HEENT: NC/AT, sclera anicteric, OP clear, 2L nc in place  Neck: Supple  Lungs: Adequate AE B, no wheezes, rales or rhonchi  CV: Regular, no m  Abd: Soft, NT, ND, +BS  Back: Incision site appears to be healing well, no drainage, erythema, upper back gauze c/d/i (drains removed)  Ext: No edema, WWP, SCD in place  Skin: Warm and dry  : Chavira in place, clear yellow urine    Data:     Recent Results (from the past 24 hour(s))   HGB & HCT    Collection Time: 18  4:00 AM   Result Value Ref Range    HGB 6.2 (L) 12.0 - 16.0 g/dL    HCT 19.4 (L) 35.0 - 45.0 %             Telemetry:normal sinus rhythm    Imaging:  CXR Results  (Last 48 hours)    None          CT Results  (Last 48 hours)    None               ROMMEL Valencia

## 2018-03-31 NOTE — PROGRESS NOTES
0830 Dr Hardik Tellez notified of decrease H/H. 2 units of PRBC ordered. 0930 Primary RN at bedside, Dr Vishal Dillon at bedside, drains pulled. Pt tolerated well. Will continue to monitor. 1330 Dr Hardik Exon called in regards to hypotension. Verbal order received to \"bolus 250ml of NS reassess BP, provide additional 250 ml if needed. \"    1420 BP not responding to fl bolus Dr Hardik Tellez paged again to notify, Dr Hardik Tellez coming to assess pt.      [phone] PCCM consulted for line access and management of pressors. PICC RN and notified of PICC order. Dr Vishal Dillon paged. 1500 Dr Vishal Dillon notified of pt condition, okay for PICC placement and levo. Stat H/H ordered and 30 mg ketorolac IV now and Q6hr PRN. 1630 PICC RN at bedside. Orders by PCCM placed. 1800 Levo started to maintain MAP 65, will continue to monitor. 1915 Bedside and Verbal shift change report given to Mortimer Avers, RN (oncoming nurse) by Tiana Avina RN (offgoing nurse). Report included the following information SBAR, Kardex, Procedure Summary, Intake/Output, MAR, Recent Results, Cardiac Rhythm NSR and Alarm Parameters .

## 2018-03-31 NOTE — PROGRESS NOTES
Problem: Falls - Risk of  Goal: *Absence of Falls  Document Ivania Fall Risk and appropriate interventions in the flowsheet.    Outcome: Progressing Towards Goal  Fall Risk Interventions:  Mobility Interventions: Bed/chair exit alarm         Medication Interventions: Bed/chair exit alarm    Elimination Interventions: Bed/chair exit alarm, Call light in reach, Toileting schedule/hourly rounds

## 2018-03-31 NOTE — PROGRESS NOTES
Progress Note    Patient: Byron Anderson MRN: [id number]  SSN: [id number]    YOB: 1960  Age: 60 y.o. Sex: female      Admit Date: 3/29/2018    LOS: 2 days     Subjective:   Pain still an issue (not on preop dose of oxycodone yet). Denies leg pain,paresthesias, and weakness. HA resolved. Objective:     Vitals:    03/31/18 0600 03/31/18 0630 03/31/18 0700 03/31/18 0800   BP: 92/51 91/54 109/58    Pulse: 75 84 73    Resp: 14 17 15    Temp:    99 °F (37.2 °C)   SpO2: 100% 99% 100%    Weight:       Height:            Intake and Output:  Current Shift: 03/31 0701 - 03/31 1900  In: -   Out: 230 [Urine:230]  Last three shifts: 03/29 1901 - 03/31 0700  In: 2324.2 [P.O.:210; I.V.:1755]  Out: 9135 [Urine:1767; Drains:960]    Physical Exam:   Alert and appropriate.  present. Completely lucid. Motor 5/5 for all. Lt grossly intact. Wound c/d/i. Thighs and calves non-tender to deep palpation. Lab/Data Review:  Recent Results (from the past 24 hour(s))   HGB & HCT    Collection Time: 03/31/18  4:00 AM   Result Value Ref Range    HGB 6.2 (L) 12.0 - 16.0 g/dL    HCT 19.4 (L) 35.0 - 45.0 %       Assessment:     Active Problems:    Lumbar radiculopathy (3/29/2018)      Radiculopathy, thoracic region (3/29/2018)      Acquired flat back syndrome (3/29/2018)        Plan:   1. Transfuse 2 units PRBC for acute blood loss anemia  2. Increase oxycodone to preop dose (30 mg q 4 hours)  3. Drains pulled  4.   Up to chair and mobilize after transfusion of blood    Signed By: Abdifatah Walker MD     March 31, 2018

## 2018-03-31 NOTE — PROGRESS NOTES
Progress Note      Patient: Tramaine Lewis               Sex: female          DOA: 3/29/2018       YOB: 1960      Age:  60 y.o.        LOS:  LOS: 2 days               Subjective:   Pt is hypotensive and is also having problems with pain which is not well controlled ,theophylline pt has had multiple back surgeries and has chromnic pain . As she is hypotensive the pt was given a fluid bolus but this was not effective . The pt will need a picc line and will need to be on pressors before her pain meds can be increased . Will ask pulmonary to see the pt . Earlier the pt was given 2 units of rbcs  as her h/h dropped to   6.2/19.4    Objective:      Visit Vitals    BP 92/72    Pulse 80    Temp 97.9 °F (36.6 °C)    Resp 17    Ht 5' 6\" (1.676 m)    Wt 58.4 kg (128 lb 12.8 oz)    SpO2 97%    BMI 20.79 kg/m2       Physical Exam:  Pt was awake with her  at her side  Heart reg rte and rhythm   Lungs good breath sounds heard   Abdomen soft and  Bowel sounds decreased   Neuro  nonfocal         Lab/Data Reviewed:  BMP: No results found for: NA, K, CL, CO2, AGAP, GLU, BUN, CREA, GFRAA, GFRNA  CMP: No results found for: NA, K, CL, CO2, AGAP, GLU, BUN, CREA, GFRAA, GFRNA, CA, MG, PHOS, ALB, TBIL, TP, ALB, GLOB, AGRAT, SGOT, ALT, GPT  CBC:   Lab Results   Component Value Date/Time    HGB 6.2 (L) 03/31/2018 04:00 AM    HCT 19.4 (L) 03/31/2018 04:00 AM           Assessment/Plan     Active Problems:    Lumbar radiculopathy (3/29/2018)      Radiculopathy, thoracic region (3/29/2018)      Acquired flat back syndrome (3/29/2018)        Plan:pt is s/p extensive surgery as well documented by dr Jeanne Walker . tyhe pt has pain not yet controlled and is hypotensive . she will require pressors so that he pain can be controlled .  Will also check the h/h

## 2018-03-31 NOTE — ROUTINE PROCESS
Right PICC inserted utilizing 3CG for SVC tip verification. Released for use, RN Dee Dee's notified. 2ml 1% lidocaine injected SC at insertion site for local anesthetic.

## 2018-04-01 LAB
ABO + RH BLD: NORMAL
ALBUMIN SERPL-MCNC: 2.4 G/DL (ref 3.4–5)
ALBUMIN/GLOB SERPL: 1.1 {RATIO} (ref 0.8–1.7)
ALP SERPL-CCNC: 74 U/L (ref 45–117)
ALT SERPL-CCNC: 23 U/L (ref 13–56)
ANION GAP SERPL CALC-SCNC: 3 MMOL/L (ref 3–18)
AST SERPL-CCNC: 33 U/L (ref 15–37)
BASOPHILS # BLD: 0 K/UL (ref 0–0.06)
BASOPHILS NFR BLD: 0 % (ref 0–2)
BILIRUB SERPL-MCNC: 0.2 MG/DL (ref 0.2–1)
BLD PROD TYP BPU: NORMAL
BLOOD GROUP ANTIBODIES SERPL: NORMAL
BPU ID: NORMAL
BUN SERPL-MCNC: 8 MG/DL (ref 7–18)
BUN/CREAT SERPL: 22 (ref 12–20)
CALCIUM SERPL-MCNC: 7.8 MG/DL (ref 8.5–10.1)
CALLED TO:,BCALL1: NORMAL
CHLORIDE SERPL-SCNC: 109 MMOL/L (ref 100–108)
CO2 SERPL-SCNC: 31 MMOL/L (ref 21–32)
CREAT SERPL-MCNC: 0.36 MG/DL (ref 0.6–1.3)
CROSSMATCH RESULT,%XM: NORMAL
DIFFERENTIAL METHOD BLD: ABNORMAL
EOSINOPHIL # BLD: 0.6 K/UL (ref 0–0.4)
EOSINOPHIL NFR BLD: 7 % (ref 0–5)
ERYTHROCYTE [DISTWIDTH] IN BLOOD BY AUTOMATED COUNT: 15.4 % (ref 11.6–14.5)
GLOBULIN SER CALC-MCNC: 2.1 G/DL (ref 2–4)
GLUCOSE SERPL-MCNC: 101 MG/DL (ref 74–99)
HCT VFR BLD AUTO: 25.8 % (ref 35–45)
HGB BLD-MCNC: 8.4 G/DL (ref 12–16)
LYMPHOCYTES # BLD: 1.9 K/UL (ref 0.9–3.6)
LYMPHOCYTES NFR BLD: 24 % (ref 21–52)
MAGNESIUM SERPL-MCNC: 2.2 MG/DL (ref 1.6–2.6)
MCH RBC QN AUTO: 28.6 PG (ref 24–34)
MCHC RBC AUTO-ENTMCNC: 32.6 G/DL (ref 31–37)
MCV RBC AUTO: 87.8 FL (ref 74–97)
MONOCYTES # BLD: 0.8 K/UL (ref 0.05–1.2)
MONOCYTES NFR BLD: 10 % (ref 3–10)
NEUTS SEG # BLD: 4.7 K/UL (ref 1.8–8)
NEUTS SEG NFR BLD: 59 % (ref 40–73)
PHOSPHATE SERPL-MCNC: 3.6 MG/DL (ref 2.5–4.9)
PLATELET # BLD AUTO: 150 K/UL (ref 135–420)
PMV BLD AUTO: 8.1 FL (ref 9.2–11.8)
POTASSIUM SERPL-SCNC: 4.4 MMOL/L (ref 3.5–5.5)
PROT SERPL-MCNC: 4.5 G/DL (ref 6.4–8.2)
RBC # BLD AUTO: 2.94 M/UL (ref 4.2–5.3)
SODIUM SERPL-SCNC: 143 MMOL/L (ref 136–145)
SPECIMEN EXP DATE BLD: NORMAL
STATUS OF UNIT,%ST: NORMAL
UNIT DIVISION, %UDIV: 0
WBC # BLD AUTO: 8 K/UL (ref 4.6–13.2)

## 2018-04-01 PROCEDURE — 74011250637 HC RX REV CODE- 250/637: Performed by: PHYSICIAN ASSISTANT

## 2018-04-01 PROCEDURE — 74011250637 HC RX REV CODE- 250/637: Performed by: NEUROLOGICAL SURGERY

## 2018-04-01 PROCEDURE — 74011000250 HC RX REV CODE- 250: Performed by: PHYSICIAN ASSISTANT

## 2018-04-01 PROCEDURE — 80053 COMPREHEN METABOLIC PANEL: CPT | Performed by: PHYSICIAN ASSISTANT

## 2018-04-01 PROCEDURE — 65610000006 HC RM INTENSIVE CARE

## 2018-04-01 PROCEDURE — 77010033678 HC OXYGEN DAILY

## 2018-04-01 PROCEDURE — 77030020263 HC SOL INJ SOD CL0.9% LFCR 1000ML

## 2018-04-01 PROCEDURE — 74011250636 HC RX REV CODE- 250/636: Performed by: NEUROLOGICAL SURGERY

## 2018-04-01 PROCEDURE — 85025 COMPLETE CBC W/AUTO DIFF WBC: CPT | Performed by: PHYSICIAN ASSISTANT

## 2018-04-01 PROCEDURE — 83735 ASSAY OF MAGNESIUM: CPT | Performed by: PHYSICIAN ASSISTANT

## 2018-04-01 PROCEDURE — 84100 ASSAY OF PHOSPHORUS: CPT | Performed by: PHYSICIAN ASSISTANT

## 2018-04-01 RX ORDER — HYDROMORPHONE HYDROCHLORIDE 2 MG/ML
2 INJECTION, SOLUTION INTRAMUSCULAR; INTRAVENOUS; SUBCUTANEOUS
Status: DISCONTINUED | OUTPATIENT
Start: 2018-04-01 | End: 2018-04-03

## 2018-04-01 RX ORDER — NOREPINEPHRINE BIT/0.9 % NACL 8 MG/250ML
2-16 INFUSION BOTTLE (ML) INTRAVENOUS
Status: DISCONTINUED | OUTPATIENT
Start: 2018-04-01 | End: 2018-04-04

## 2018-04-01 RX ADMIN — Medication 10 ML: at 21:05

## 2018-04-01 RX ADMIN — Medication 10 ML: at 16:30

## 2018-04-01 RX ADMIN — CYCLOBENZAPRINE HYDROCHLORIDE 10 MG: 10 TABLET, FILM COATED ORAL at 04:20

## 2018-04-01 RX ADMIN — Medication 10 ML: at 06:09

## 2018-04-01 RX ADMIN — HYDROMORPHONE HYDROCHLORIDE 2 MG: 2 INJECTION INTRAMUSCULAR; INTRAVENOUS; SUBCUTANEOUS at 21:03

## 2018-04-01 RX ADMIN — CYCLOBENZAPRINE HYDROCHLORIDE 10 MG: 10 TABLET, FILM COATED ORAL at 16:26

## 2018-04-01 RX ADMIN — KETOROLAC TROMETHAMINE 30 MG: 30 INJECTION, SOLUTION INTRAMUSCULAR at 18:41

## 2018-04-01 RX ADMIN — DULOXETINE HYDROCHLORIDE 60 MG: 60 CAPSULE, DELAYED RELEASE ORAL at 08:09

## 2018-04-01 RX ADMIN — KETOROLAC TROMETHAMINE 30 MG: 30 INJECTION, SOLUTION INTRAMUSCULAR at 06:08

## 2018-04-01 RX ADMIN — KETOROLAC TROMETHAMINE 30 MG: 30 INJECTION, SOLUTION INTRAMUSCULAR at 11:44

## 2018-04-01 RX ADMIN — CIPROFLOXACIN HYDROCHLORIDE 500 MG: 500 TABLET, FILM COATED ORAL at 08:09

## 2018-04-01 RX ADMIN — GABAPENTIN 400 MG: 400 CAPSULE ORAL at 22:06

## 2018-04-01 RX ADMIN — Medication 10 ML: at 06:08

## 2018-04-01 RX ADMIN — KETOROLAC TROMETHAMINE 30 MG: 30 INJECTION, SOLUTION INTRAMUSCULAR at 00:19

## 2018-04-01 RX ADMIN — Medication 10 ML: at 16:29

## 2018-04-01 RX ADMIN — GABAPENTIN 300 MG: 300 CAPSULE ORAL at 08:09

## 2018-04-01 RX ADMIN — SODIUM CHLORIDE 100 ML/HR: 900 INJECTION, SOLUTION INTRAVENOUS at 06:30

## 2018-04-01 RX ADMIN — OXYCODONE HYDROCHLORIDE 30 MG: 5 TABLET ORAL at 02:00

## 2018-04-01 RX ADMIN — HYDROMORPHONE HYDROCHLORIDE 2 MG: 2 INJECTION INTRAMUSCULAR; INTRAVENOUS; SUBCUTANEOUS at 16:26

## 2018-04-01 RX ADMIN — OXYCODONE HYDROCHLORIDE 30 MG: 5 TABLET ORAL at 08:08

## 2018-04-01 RX ADMIN — HYDROMORPHONE HYDROCHLORIDE 2 MG: 2 INJECTION INTRAMUSCULAR; INTRAVENOUS; SUBCUTANEOUS at 10:11

## 2018-04-01 RX ADMIN — GABAPENTIN 400 MG: 400 CAPSULE ORAL at 16:26

## 2018-04-01 RX ADMIN — OXYCODONE HYDROCHLORIDE 30 MG: 5 TABLET ORAL at 19:45

## 2018-04-01 RX ADMIN — DULOXETINE HYDROCHLORIDE 60 MG: 60 CAPSULE, DELAYED RELEASE ORAL at 17:19

## 2018-04-01 RX ADMIN — NOREPINEPHRINE BITARTRATE 4 MCG/MIN: 1 INJECTION INTRAVENOUS at 14:36

## 2018-04-01 RX ADMIN — CYCLOBENZAPRINE HYDROCHLORIDE 10 MG: 10 TABLET, FILM COATED ORAL at 11:44

## 2018-04-01 RX ADMIN — Medication 10 ML: at 21:06

## 2018-04-01 RX ADMIN — MAGNESIUM HYDROXIDE 30 ML: 400 SUSPENSION ORAL at 08:09

## 2018-04-01 RX ADMIN — OXYCODONE HYDROCHLORIDE 30 MG: 5 TABLET ORAL at 14:25

## 2018-04-01 RX ADMIN — POTASSIUM & SODIUM PHOSPHATES POWDER PACK 280-160-250 MG 2 PACKET: 280-160-250 PACK at 00:19

## 2018-04-01 RX ADMIN — CIPROFLOXACIN HYDROCHLORIDE 500 MG: 500 TABLET, FILM COATED ORAL at 21:03

## 2018-04-01 RX ADMIN — OXYCODONE HYDROCHLORIDE 30 MG: 5 TABLET ORAL at 23:53

## 2018-04-01 NOTE — PROGRESS NOTES
Progress Note    Patient: Wen Hanson MRN: [id number]  SSN: [id number]    YOB: 1960  Age: 60 y.o. Sex: female      Admit Date: 3/29/2018    LOS: 3 days     Subjective:   Continued incisional pain issues but slowly improving. Denies HA. Denies Leg pain/paresthesias. Objective:     Vitals:    04/01/18 0600 04/01/18 0700 04/01/18 0800 04/01/18 0900   BP: 138/72 115/53 110/66 107/64   Pulse: 75 66 79 80   Resp: 16 13 19 13   Temp:   98.1 °F (36.7 °C)    SpO2: 96% 100% 97% 98%   Weight:       Height:            Intake and Output:  Current Shift: 04/01 0701 - 04/01 1900  In: 207.1 [I.V.:207.1]  Out: 325 [Urine:325]  Last three shifts: 03/30 1901 - 04/01 0700  In: 6057.2 [P.O.:890; I.V.:5167.2]  Out: 5058 [Urine:4410; Drains:70]    Physical Exam:   Alert and appropriate. Motor 5/5 for all. Lt grossly intact. Wound c/d/i; flat. Thighs and calves non-tender to deep palpation. Lab/Data Review:  Recent Results (from the past 24 hour(s))   METABOLIC PANEL, BASIC    Collection Time: 03/31/18  6:15 PM   Result Value Ref Range    Sodium 142 136 - 145 mmol/L    Potassium 3.8 3.5 - 5.5 mmol/L    Chloride 108 100 - 108 mmol/L    CO2 30 21 - 32 mmol/L    Anion gap 4 3.0 - 18 mmol/L    Glucose 97 74 - 99 mg/dL    BUN 14 7.0 - 18 MG/DL    Creatinine 0.46 (L) 0.6 - 1.3 MG/DL    BUN/Creatinine ratio 30 (H) 12 - 20      GFR est AA >60 >60 ml/min/1.73m2    GFR est non-AA >60 >60 ml/min/1.73m2    Calcium 7.4 (L) 8.5 - 10.1 MG/DL   MAGNESIUM    Collection Time: 03/31/18  6:15 PM   Result Value Ref Range    Magnesium 2.2 1.6 - 2.6 mg/dL   PHOSPHORUS    Collection Time: 03/31/18  6:15 PM   Result Value Ref Range    Phosphorus 2.1 (L) 2.5 - 4.9 MG/DL   CALCIUM, IONIZED    Collection Time: 03/31/18  6:15 PM   Result Value Ref Range    Ionized Calcium 1.20 1. 12 - 1.32 MMOL/L   CBC W/O DIFF    Collection Time: 03/31/18  6:15 PM   Result Value Ref Range    WBC 7.4 4.6 - 13.2 K/uL    RBC 2.65 (L) 4.20 - 5.30 M/uL    HGB 7.7 (L) 12.0 - 16.0 g/dL    HCT 23.4 (L) 35.0 - 45.0 %    MCV 88.3 74.0 - 97.0 FL    MCH 29.1 24.0 - 34.0 PG    MCHC 32.9 31.0 - 37.0 g/dL    RDW 15.4 (H) 11.6 - 14.5 %    PLATELET 216 (L) 713 - 420 K/uL    MPV 9.6 9.2 - 11.8 FL   CBC WITH AUTOMATED DIFF    Collection Time: 04/01/18  4:15 AM   Result Value Ref Range    WBC 8.0 4.6 - 13.2 K/uL    RBC 2.94 (L) 4.20 - 5.30 M/uL    HGB 8.4 (L) 12.0 - 16.0 g/dL    HCT 25.8 (L) 35.0 - 45.0 %    MCV 87.8 74.0 - 97.0 FL    MCH 28.6 24.0 - 34.0 PG    MCHC 32.6 31.0 - 37.0 g/dL    RDW 15.4 (H) 11.6 - 14.5 %    PLATELET 432 031 - 685 K/uL    MPV 8.1 (L) 9.2 - 11.8 FL    NEUTROPHILS 59 40 - 73 %    LYMPHOCYTES 24 21 - 52 %    MONOCYTES 10 3 - 10 %    EOSINOPHILS 7 (H) 0 - 5 %    BASOPHILS 0 0 - 2 %    ABS. NEUTROPHILS 4.7 1.8 - 8.0 K/UL    ABS. LYMPHOCYTES 1.9 0.9 - 3.6 K/UL    ABS. MONOCYTES 0.8 0.05 - 1.2 K/UL    ABS. EOSINOPHILS 0.6 (H) 0.0 - 0.4 K/UL    ABS. BASOPHILS 0.0 0.0 - 0.06 K/UL    DF AUTOMATED     METABOLIC PANEL, COMPREHENSIVE    Collection Time: 04/01/18  4:15 AM   Result Value Ref Range    Sodium 143 136 - 145 mmol/L    Potassium 4.4 3.5 - 5.5 mmol/L    Chloride 109 (H) 100 - 108 mmol/L    CO2 31 21 - 32 mmol/L    Anion gap 3 3.0 - 18 mmol/L    Glucose 101 (H) 74 - 99 mg/dL    BUN 8 7.0 - 18 MG/DL    Creatinine 0.36 (L) 0.6 - 1.3 MG/DL    BUN/Creatinine ratio 22 (H) 12 - 20      GFR est AA >60 >60 ml/min/1.73m2    GFR est non-AA >60 >60 ml/min/1.73m2    Calcium 7.8 (L) 8.5 - 10.1 MG/DL    Bilirubin, total 0.2 0.2 - 1.0 MG/DL    ALT (SGPT) 23 13 - 56 U/L    AST (SGOT) 33 15 - 37 U/L    Alk.  phosphatase 74 45 - 117 U/L    Protein, total 4.5 (L) 6.4 - 8.2 g/dL    Albumin 2.4 (L) 3.4 - 5.0 g/dL    Globulin 2.1 2.0 - 4.0 g/dL    A-G Ratio 1.1 0.8 - 1.7     MAGNESIUM    Collection Time: 04/01/18  4:15 AM   Result Value Ref Range    Magnesium 2.2 1.6 - 2.6 mg/dL   PHOSPHORUS    Collection Time: 04/01/18  4:15 AM   Result Value Ref Range    Phosphorus 3.6 2.5 - 4.9 MG/DL       Assessment:     Active Problems:    Lumbar radiculopathy (3/29/2018)      Radiculopathy, thoracic region (3/29/2018)      Acquired flat back syndrome (3/29/2018)        Plan:   1. Continue Ketorolac, d/c PCA, start IV dilaudid at 2 mg IV q 1 hour prn, and increase neurontin to 400 mg po TID  2.   Up to chair as black      Signed By: Natan Kumar MD     April 1, 2018

## 2018-04-01 NOTE — PROGRESS NOTES
Problem: Falls - Risk of  Goal: *Absence of Falls  Document Ivania Fall Risk and appropriate interventions in the flowsheet.    Outcome: Progressing Towards Goal  Fall Risk Interventions:  Mobility Interventions: Bed/chair exit alarm         Medication Interventions: Bed/chair exit alarm    Elimination Interventions: Bed/chair exit alarm, Call light in reach             Problem: Surgical Pathway Post-Op Day 1  Goal: *Optimal pain control at patient's stated goal  Outcome: Not Met  Pt's pain still not controlled  Goal: *Hemodynamically stable  Outcome: Not Met  Pt requiring pressors

## 2018-04-01 NOTE — PROGRESS NOTES
0 - Report received from Wilberto Mccollum, [address] [place] [address]. Orders, medications, labs, skin, and neuro exam reviewed. 0700 - Report given to Charlotte Maldonado RN. Orders, medications, labs, skin, and neuro exam reviewed.

## 2018-04-01 NOTE — PROGRESS NOTES
Progress Note      Patient: Byron Anderson               Sex: female          DOA: 3/29/2018       YOB: 1960      Age:  60 y.o.        LOS:  LOS: 3 days               Subjective:   Pt is now off pressors . Her pain is slowly improving . .she is now off the pca and  Is on iv dilaudid as well as ketorolac and neurontin as per surgery .  Pt is afebrile         Objective:      Visit Vitals    /76    Pulse 70    Temp 97.6 °F (36.4 °C)    Resp 16    Ht 5' 6\" (1.676 m)    Wt 58.4 kg (128 lb 12.8 oz)    SpO2 94%    BMI 20.79 kg/m2       Physical Exam:  Pt is awake and appears to be more comfortable   Heart reg rate and rhythm   Lungs good breath sounds heard   Abdomen soft and nbowel sounds are diminished but present   Neuro will move all extremities       Lab/Data Reviewed:  BMP:   Lab Results   Component Value Date/Time     04/01/2018 04:15 AM    K 4.4 04/01/2018 04:15 AM     (H) 04/01/2018 04:15 AM    CO2 31 04/01/2018 04:15 AM    AGAP 3 04/01/2018 04:15 AM     (H) 04/01/2018 04:15 AM    BUN 8 04/01/2018 04:15 AM    CREA 0.36 (L) 04/01/2018 04:15 AM    GFRAA >60 04/01/2018 04:15 AM    GFRNA >60 04/01/2018 04:15 AM     CMP:   Lab Results   Component Value Date/Time     04/01/2018 04:15 AM    K 4.4 04/01/2018 04:15 AM     (H) 04/01/2018 04:15 AM    CO2 31 04/01/2018 04:15 AM    AGAP 3 04/01/2018 04:15 AM     (H) 04/01/2018 04:15 AM    BUN 8 04/01/2018 04:15 AM    CREA 0.36 (L) 04/01/2018 04:15 AM    GFRAA >60 04/01/2018 04:15 AM    GFRNA >60 04/01/2018 04:15 AM    CA 7.8 (L) 04/01/2018 04:15 AM    MG 2.2 04/01/2018 04:15 AM    PHOS 3.6 04/01/2018 04:15 AM    ALB 2.4 (L) 04/01/2018 04:15 AM    TP 4.5 (L) 04/01/2018 04:15 AM    GLOB 2.1 04/01/2018 04:15 AM    AGRAT 1.1 04/01/2018 04:15 AM    SGOT 33 04/01/2018 04:15 AM    ALT 23 04/01/2018 04:15 AM     CBC:   Lab Results   Component Value Date/Time    WBC 8.0 04/01/2018 04:15 AM    HGB 8.4 (L) 04/01/2018 04:15 AM    HCT 25.8 (L) 04/01/2018 04:15 AM     04/01/2018 04:15 AM           Assessment/Plan     Active Problems:    Lumbar radiculopathy (3/29/2018)      Radiculopathy, thoracic region (3/29/2018)      Acquired flat back syndrome (3/29/2018)        Plan: continue to treatt he pt's pain . she will be mobiluized  And will be up to the chair as tolerated

## 2018-04-01 NOTE — ROUTINE PROCESS
0700: Assumed care of pt at this time. Received verbal bedside shift report from [address] [place] [address] (offgoing) to Sage Carlos RN (oncoming).

## 2018-04-01 NOTE — CONSULTS
[facility]  Name: Aimee Burnham   : 1960   MRN: [id number]   Date: 2018    []I have reviewed the flowsheet and previous days notes. []The patient is unable to give any meaningful history or review of systems because the patient is:  []Intubated []Sedated   []Unresponsive      []The patient is critically ill on      []Mechanical ventilation []Pressors   []BiPAP []   : s: fEELS A LOT BETTER, LESS BACK PAIN, no cp, NO DYSPNEA. OFF PRESSORS. HUNGRY! ROS:A comprehensive review of systems was negative except for that written in the HPI. Events and notes from last 24 hours reviewed. Care plan discussed on multidisciplinary rounds. Vital Signs:    Visit Vitals    /45    Pulse 77    Temp 97.3 °F (36.3 °C)    Resp 17    Ht 5' 6\" (1.676 m)    Wt 58.4 kg (128 lb 12.8 oz)    SpO2 97%    BMI 20.79 kg/m2       O2 Device: Nasal cannula   O2 Flow Rate (L/min): 2 l/min   Temp (24hrs), Av.3 °F (36.8 °C), Min:97.3 °F (36.3 °C), Max:99.2 °F (37.3 °C)       Intake/Output:   Last shift:       07 -  190  In: 516.3 [I.V.:516.3]  Out: 985 [Urine:985]  Last 3 shifts: 1901 -  0700  In: 6057.2 [P.O.:890; I.V.:5167.2]  Out: 0135 [Urine:4410; Drains:70]    Intake/Output Summary (Last 24 hours) at 18 1327  Last data filed at 18 1259   Gross per 24 hour   Intake          4228.45 ml   Output             4440 ml   Net          -211.55 ml     Hemodynamics:       :      Ventilator Settings:                Physical Exam:    General: in no apparent distress, well developed and well nourished, non-toxic, in no respiratory distress and acyanotic, alert, oriented times 3 and afebrile   HEENT: Normal   Neck: No abnormally enlarged lymph nodes.    Chest: normal   Lungs: normal air entry  no dullness/ tenderness/ rash   Heart: Regular rate and rhythm or S1S2 present   Abdomen: non distended, bowel sounds normoactive, tympanic, abdomen is soft without significant tenderness, masses, organomegaly or guarding   Extremity: negative   Neuro: alert   Skin: Skin color, texture, turgor normal. No rashes or lesions      DATA:   Current Facility-Administered Medications   Medication Dose Route Frequency    HYDROmorphone (DILAUDID) injection 2 mg  2 mg IntraVENous Q1H PRN    gabapentin (NEURONTIN) capsule 400 mg  400 mg Oral TID    NOREPINephrine (LEVOPHED) 8 mg in 0.9% sodium chloride 250 mL infusion  2-16 mcg/min IntraVENous TITRATE    0.9% sodium chloride infusion 250 mL  250 mL IntraVENous PRN    oxyCODONE IR (ROXICODONE) tablet 30 mg  30 mg Oral Q4H PRN    ketorolac (TORADOL) injection 30 mg  30 mg IntraVENous Q6H PRN    ciprofloxacin HCl (CIPRO) tablet 500 mg  500 mg Oral Q12H    bacitracin 500 unit/gram packet 1 Packet  1 Packet Topical PRN    sodium chloride (NS) flush 10-30 mL  10-30 mL InterCATHeter PRN    sodium chloride (NS) flush 10 mL  10 mL InterCATHeter Q24H    sodium chloride (NS) flush 10 mL  10 mL InterCATHeter PRN    sodium chloride (NS) flush 10-40 mL  10-40 mL InterCATHeter Q8H    sodium chloride (NS) flush 20 mL  20 mL InterCATHeter Q24H    ELECTROLYTE REPLACEMENT PROTOCOL  1 Each Other PRN    ELECTROLYTE REPLACEMENT PROTOCOL  1 Each Other PRN    ELECTROLYTE REPLACEMENT PROTOCOL  1 Each Other PRN    ELECTROLYTE REPLACEMENT PROTOCOL  1 Each Other PRN    ELECTROLYTE REPLACEMENT PROTOCOL  1 Each Other PRN    ELECTROLYTE REPLACEMENT PROTOCOL  1 Each Other PRN    ELECTROLYTE REPLACEMENT PROTOCOL  1 Each Other PRN    0.9% sodium chloride infusion  100 mL/hr IntraVENous CONTINUOUS    cyclobenzaprine (FLEXERIL) tablet 10 mg  10 mg Oral TID PRN    DULoxetine (CYMBALTA) capsule 60 mg  60 mg Oral BID    sodium chloride (NS) flush 5-10 mL  5-10 mL IntraVENous Q8H    sodium chloride (NS) flush 5-10 mL  5-10 mL IntraVENous PRN    acetaminophen (TYLENOL) tablet 650 mg  650 mg Oral Q4H PRN    naloxone (NARCAN) injection 0.4 mg  0.4 mg IntraVENous PRN    ondansetron (ZOFRAN) injection 4 mg  4 mg IntraVENous Q4H PRN    phenol throat spray (CHLORASEPTIC) 1 Spray  1 Spray Oral PRN    magnesium hydroxide (MILK OF MAGNESIA) 400 mg/5 mL oral suspension 30 mL  30 mL Oral DAILY    sodium chloride (NS) flush 5-10 mL  5-10 mL IntraVENous Q8H    sodium chloride (NS) flush 5-10 mL  5-10 mL IntraVENous PRN    0.9% sodium chloride infusion 250 mL  250 mL IntraVENous PRN    influenza vaccine 2017-18 (3 yrs+)(PF) (FLUZONE QUAD/FLUARIX QUAD) injection 0.5 mL  0.5 mL IntraMUSCular PRIOR TO DISCHARGE       Telemetry: [x]Sinus []A-flutter []Paced    []A-fib []Multiple PVCs                  Labs:  Recent Labs      04/01/18   0415  03/31/18   1815  03/31/18   0400  03/30/18   0215   WBC  8.0  7.4   --   12.5   HGB  8.4*  7.7*  6.2*  8.7*   HCT  25.8*  23.4*  19.4*  26.5*   PLT  150  118*   --   212     Recent Labs      04/01/18 0415  03/31/18   1815   NA  143  142   K  4.4  3.8   CL  109*  108   CO2  31  30   GLU  101*  97   BUN  8  14   CREA  0.36*  0.46*   CA  7.8*  7.4*   MG  2.2  2.2   PHOS  3.6  2.1*   ALB  2.4*   --    SGOT  33   --    ALT  23   --      No results for input(s): PH, PCO2, PO2, HCO3, FIO2 in the last 72 hours.     Imaging:  [x]I have personally reviewed the patients radiographs  []Radiographs reviewed with radiologist   [x] No CXR study available for review today   []No change from prior, tubes and lines in adequate position  []Improved   []Worsening       IMPRESSION:   Patient Active Problem List   Diagnosis Code    Chronic low back pain M54.5, G89.29    Degeneration of lumbar or lumbosacral intervertebral disc M51.37    Encounter for long-term (current) use of high-risk medication Z79.899    Myalgia and myositis, unspecified [id number]    Spasm of muscle M62.838    Lumbar stenosis M48.061    Hypertension I10    Osteoarthritis of right hip M16.11    Osteoarthritis of left hip M16.12    Lumbar radiculopathy M54.16    Radiculopathy, thoracic region M54.14    Acquired flat back syndrome M40.30 ·     · Chronic back pain, lumbar radiculopathy, flat back syndrome s/p prolonged back surgery 3/29   · Acute blood loss anemia in setting of above surgery-hgb 6.2-receiving 2uPRBC  · Hypotension due to above as well as pain medication  · E. Faecalis UTI  · Hx palpitations    NOW OFF PRESSORS AND HUNGRY! PLAN: ·   Transfusion continuing as ordered-currently finishing second unit (2/2) at this time. Repeat CBC after transfusion completed. · Continue with IVF maintenance NS @100cc/hr, s/p 500cc bolus  · Place PICC, start levophed if needed for MAP>60-65  · Wean O2 as tolerated for sat>94%  · Add incentive spirometer and mobilize as per neurosurgery, baseline CXR if able  · Check BMP, Mg, phos, Ca  · Holding Toprol for now, pt asymptomatic without palpitations, no tachycardia or arrhythmia noted  · Chavira in place per now as per NS, remove as soon as able  · S/p post op course clinda, completed 3/30  · Urine cx noted 30K E.  Faecalis-will tx course cipro d/w pharmacy  · Continue with pain control-currently on dilaudid PCA, oxycodone 30q4 prn and toradol added  · Continue home doses neurontin and cymbalta, flexeril prn  · Other management per primary hospitalist team and neurosurgery   · Prophylaxis - DVT-SCD  · FULL CODE  ·    PLAN:   · Dr Jared Fernandes ICU team will continue her PCCM Care     The patient is: [x] acutely ill Risk of deterioration: [x] moderate    [] critically ill  [] high     [x]See my orders for details    My assessment, plan of care, findings, medications, side effects etc were discussed with:  [x]nursing []PT/OT    [x]respiratory therapy []Dr. Edenilson Parker []     [x]Total critical care time exclusive of procedures 25 minutes  Trenton Bravo MD

## 2018-04-02 LAB
ALBUMIN SERPL-MCNC: 2.3 G/DL (ref 3.4–5)
ALBUMIN SERPL-MCNC: 2.4 G/DL (ref 3.4–5)
ALBUMIN/GLOB SERPL: 1 {RATIO} (ref 0.8–1.7)
ALBUMIN/GLOB SERPL: 1.1 {RATIO} (ref 0.8–1.7)
ALP SERPL-CCNC: 78 U/L (ref 45–117)
ALP SERPL-CCNC: 79 U/L (ref 45–117)
ALT SERPL-CCNC: 22 U/L (ref 13–56)
ALT SERPL-CCNC: 22 U/L (ref 13–56)
ANION GAP SERPL CALC-SCNC: 3 MMOL/L (ref 3–18)
ANION GAP SERPL CALC-SCNC: 5 MMOL/L (ref 3–18)
AST SERPL-CCNC: 25 U/L (ref 15–37)
AST SERPL-CCNC: 26 U/L (ref 15–37)
BASOPHILS # BLD: 0 K/UL (ref 0–0.06)
BASOPHILS NFR BLD: 0 % (ref 0–2)
BILIRUB SERPL-MCNC: 0.1 MG/DL (ref 0.2–1)
BILIRUB SERPL-MCNC: 0.1 MG/DL (ref 0.2–1)
BUN SERPL-MCNC: 7 MG/DL (ref 7–18)
BUN SERPL-MCNC: 8 MG/DL (ref 7–18)
BUN/CREAT SERPL: 18 (ref 12–20)
BUN/CREAT SERPL: 20 (ref 12–20)
CALCIUM SERPL-MCNC: 7.9 MG/DL (ref 8.5–10.1)
CALCIUM SERPL-MCNC: 8.2 MG/DL (ref 8.5–10.1)
CHLORIDE SERPL-SCNC: 104 MMOL/L (ref 100–108)
CHLORIDE SERPL-SCNC: 105 MMOL/L (ref 100–108)
CO2 SERPL-SCNC: 32 MMOL/L (ref 21–32)
CO2 SERPL-SCNC: 36 MMOL/L (ref 21–32)
CREAT SERPL-MCNC: 0.39 MG/DL (ref 0.6–1.3)
CREAT SERPL-MCNC: 0.4 MG/DL (ref 0.6–1.3)
DIFFERENTIAL METHOD BLD: ABNORMAL
EOSINOPHIL # BLD: 0.7 K/UL (ref 0–0.4)
EOSINOPHIL NFR BLD: 9 % (ref 0–5)
ERYTHROCYTE [DISTWIDTH] IN BLOOD BY AUTOMATED COUNT: 15.6 % (ref 11.6–14.5)
GLOBULIN SER CALC-MCNC: 2.1 G/DL (ref 2–4)
GLOBULIN SER CALC-MCNC: 2.2 G/DL (ref 2–4)
GLUCOSE SERPL-MCNC: 109 MG/DL (ref 74–99)
GLUCOSE SERPL-MCNC: 122 MG/DL (ref 74–99)
HCT VFR BLD AUTO: 26.1 % (ref 35–45)
HGB BLD-MCNC: 8.3 G/DL (ref 12–16)
LYMPHOCYTES # BLD: 1.8 K/UL (ref 0.9–3.6)
LYMPHOCYTES NFR BLD: 24 % (ref 21–52)
MAGNESIUM SERPL-MCNC: 2.2 MG/DL (ref 1.6–2.6)
MCH RBC QN AUTO: 28.4 PG (ref 24–34)
MCHC RBC AUTO-ENTMCNC: 31.8 G/DL (ref 31–37)
MCV RBC AUTO: 89.4 FL (ref 74–97)
MONOCYTES # BLD: 0.7 K/UL (ref 0.05–1.2)
MONOCYTES NFR BLD: 9 % (ref 3–10)
NEUTS SEG # BLD: 4.3 K/UL (ref 1.8–8)
NEUTS SEG NFR BLD: 58 % (ref 40–73)
PHOSPHATE SERPL-MCNC: 3.2 MG/DL (ref 2.5–4.9)
PLATELET # BLD AUTO: 198 K/UL (ref 135–420)
PMV BLD AUTO: 8.7 FL (ref 9.2–11.8)
POTASSIUM SERPL-SCNC: 3.7 MMOL/L (ref 3.5–5.5)
POTASSIUM SERPL-SCNC: 3.9 MMOL/L (ref 3.5–5.5)
PROT SERPL-MCNC: 4.5 G/DL (ref 6.4–8.2)
PROT SERPL-MCNC: 4.5 G/DL (ref 6.4–8.2)
RBC # BLD AUTO: 2.92 M/UL (ref 4.2–5.3)
SODIUM SERPL-SCNC: 142 MMOL/L (ref 136–145)
SODIUM SERPL-SCNC: 143 MMOL/L (ref 136–145)
WBC # BLD AUTO: 7.5 K/UL (ref 4.6–13.2)

## 2018-04-02 PROCEDURE — 74011250637 HC RX REV CODE- 250/637: Performed by: PHYSICIAN ASSISTANT

## 2018-04-02 PROCEDURE — 83735 ASSAY OF MAGNESIUM: CPT | Performed by: PHYSICIAN ASSISTANT

## 2018-04-02 PROCEDURE — 85025 COMPLETE CBC W/AUTO DIFF WBC: CPT | Performed by: PHYSICIAN ASSISTANT

## 2018-04-02 PROCEDURE — 74011250636 HC RX REV CODE- 250/636: Performed by: NEUROLOGICAL SURGERY

## 2018-04-02 PROCEDURE — 36592 COLLECT BLOOD FROM PICC: CPT

## 2018-04-02 PROCEDURE — 77030020256 HC SOL INJ NACL 0.9%  500ML

## 2018-04-02 PROCEDURE — 84100 ASSAY OF PHOSPHORUS: CPT | Performed by: PHYSICIAN ASSISTANT

## 2018-04-02 PROCEDURE — 36592 COLLECT BLOOD FROM PICC: CPT | Performed by: NURSE PRACTITIONER

## 2018-04-02 PROCEDURE — 74011250637 HC RX REV CODE- 250/637: Performed by: NEUROLOGICAL SURGERY

## 2018-04-02 PROCEDURE — 80053 COMPREHEN METABOLIC PANEL: CPT | Performed by: PHYSICIAN ASSISTANT

## 2018-04-02 PROCEDURE — 65610000006 HC RM INTENSIVE CARE

## 2018-04-02 PROCEDURE — 74011250637 HC RX REV CODE- 250/637: Performed by: HOSPITALIST

## 2018-04-02 PROCEDURE — 77030020263 HC SOL INJ SOD CL0.9% LFCR 1000ML

## 2018-04-02 PROCEDURE — 80053 COMPREHEN METABOLIC PANEL: CPT | Performed by: INTERNAL MEDICINE

## 2018-04-02 PROCEDURE — 77010033678 HC OXYGEN DAILY

## 2018-04-02 RX ORDER — PANTOPRAZOLE SODIUM 40 MG/1
40 TABLET, DELAYED RELEASE ORAL DAILY
Status: DISCONTINUED | OUTPATIENT
Start: 2018-04-02 | End: 2018-04-10 | Stop reason: HOSPADM

## 2018-04-02 RX ORDER — POTASSIUM CHLORIDE 29.8 MG/ML
20 INJECTION INTRAVENOUS ONCE
Status: COMPLETED | OUTPATIENT
Start: 2018-04-02 | End: 2018-04-06

## 2018-04-02 RX ORDER — POTASSIUM CHLORIDE 7.45 MG/ML
10 INJECTION INTRAVENOUS ONCE
Status: COMPLETED | OUTPATIENT
Start: 2018-04-02 | End: 2018-04-06

## 2018-04-02 RX ORDER — PANTOPRAZOLE SODIUM 40 MG/1
40 TABLET, DELAYED RELEASE ORAL DAILY
Status: DISCONTINUED | OUTPATIENT
Start: 2018-04-03 | End: 2018-04-02

## 2018-04-02 RX ADMIN — HYDROMORPHONE HYDROCHLORIDE 2 MG: 2 INJECTION INTRAMUSCULAR; INTRAVENOUS; SUBCUTANEOUS at 01:21

## 2018-04-02 RX ADMIN — SODIUM CHLORIDE 100 ML/HR: 900 INJECTION, SOLUTION INTRAVENOUS at 14:02

## 2018-04-02 RX ADMIN — Medication 20 ML: at 18:11

## 2018-04-02 RX ADMIN — KETOROLAC TROMETHAMINE 30 MG: 30 INJECTION, SOLUTION INTRAMUSCULAR at 01:57

## 2018-04-02 RX ADMIN — OXYCODONE HYDROCHLORIDE 30 MG: 5 TABLET ORAL at 12:31

## 2018-04-02 RX ADMIN — HYDROMORPHONE HYDROCHLORIDE 2 MG: 2 INJECTION INTRAMUSCULAR; INTRAVENOUS; SUBCUTANEOUS at 11:04

## 2018-04-02 RX ADMIN — POTASSIUM CHLORIDE 10 MEQ: 10 INJECTION, SOLUTION INTRAVENOUS at 18:11

## 2018-04-02 RX ADMIN — DULOXETINE HYDROCHLORIDE 60 MG: 60 CAPSULE, DELAYED RELEASE ORAL at 08:03

## 2018-04-02 RX ADMIN — CYCLOBENZAPRINE HYDROCHLORIDE 10 MG: 10 TABLET, FILM COATED ORAL at 05:56

## 2018-04-02 RX ADMIN — PANTOPRAZOLE SODIUM 40 MG: 40 TABLET, DELAYED RELEASE ORAL at 18:11

## 2018-04-02 RX ADMIN — HYDROMORPHONE HYDROCHLORIDE 2 MG: 2 INJECTION INTRAMUSCULAR; INTRAVENOUS; SUBCUTANEOUS at 03:21

## 2018-04-02 RX ADMIN — DULOXETINE HYDROCHLORIDE 60 MG: 60 CAPSULE, DELAYED RELEASE ORAL at 18:11

## 2018-04-02 RX ADMIN — HYDROMORPHONE HYDROCHLORIDE 2 MG: 2 INJECTION INTRAMUSCULAR; INTRAVENOUS; SUBCUTANEOUS at 19:31

## 2018-04-02 RX ADMIN — SODIUM CHLORIDE 100 ML/HR: 900 INJECTION, SOLUTION INTRAVENOUS at 19:35

## 2018-04-02 RX ADMIN — Medication 10 ML: at 21:12

## 2018-04-02 RX ADMIN — Medication 10 ML: at 18:11

## 2018-04-02 RX ADMIN — MAGNESIUM HYDROXIDE 30 ML: 400 SUSPENSION ORAL at 08:03

## 2018-04-02 RX ADMIN — HYDROMORPHONE HYDROCHLORIDE 2 MG: 2 INJECTION INTRAMUSCULAR; INTRAVENOUS; SUBCUTANEOUS at 05:00

## 2018-04-02 RX ADMIN — Medication 10 ML: at 13:56

## 2018-04-02 RX ADMIN — CIPROFLOXACIN HYDROCHLORIDE 500 MG: 500 TABLET, FILM COATED ORAL at 09:35

## 2018-04-02 RX ADMIN — GABAPENTIN 400 MG: 400 CAPSULE ORAL at 16:32

## 2018-04-02 RX ADMIN — OXYCODONE HYDROCHLORIDE 30 MG: 5 TABLET ORAL at 04:22

## 2018-04-02 RX ADMIN — KETOROLAC TROMETHAMINE 30 MG: 30 INJECTION, SOLUTION INTRAMUSCULAR at 09:42

## 2018-04-02 RX ADMIN — SODIUM CHLORIDE 100 ML/HR: 900 INJECTION, SOLUTION INTRAVENOUS at 01:58

## 2018-04-02 RX ADMIN — GABAPENTIN 400 MG: 400 CAPSULE ORAL at 08:03

## 2018-04-02 RX ADMIN — GABAPENTIN 400 MG: 400 CAPSULE ORAL at 23:00

## 2018-04-02 RX ADMIN — OXYCODONE HYDROCHLORIDE 30 MG: 5 TABLET ORAL at 23:01

## 2018-04-02 RX ADMIN — CIPROFLOXACIN HYDROCHLORIDE 500 MG: 500 TABLET, FILM COATED ORAL at 23:00

## 2018-04-02 RX ADMIN — Medication 10 ML: at 13:55

## 2018-04-02 RX ADMIN — POTASSIUM CHLORIDE 20 MEQ: 400 INJECTION, SOLUTION INTRAVENOUS at 08:02

## 2018-04-02 RX ADMIN — Medication 10 ML: at 05:56

## 2018-04-02 RX ADMIN — OXYCODONE HYDROCHLORIDE 30 MG: 5 TABLET ORAL at 16:32

## 2018-04-02 RX ADMIN — CYCLOBENZAPRINE HYDROCHLORIDE 10 MG: 10 TABLET, FILM COATED ORAL at 13:55

## 2018-04-02 RX ADMIN — Medication 10 ML: at 21:11

## 2018-04-02 RX ADMIN — CYCLOBENZAPRINE HYDROCHLORIDE 10 MG: 10 TABLET, FILM COATED ORAL at 23:01

## 2018-04-02 RX ADMIN — OXYCODONE HYDROCHLORIDE 30 MG: 5 TABLET ORAL at 08:03

## 2018-04-02 NOTE — DIABETES MGMT
NUTRITIONAL ASSESSMENT / PLAN OF CARE     Marco Cotter           60 y.o.           3/29/2018               No diagnosis found. INTERVENTIONS/PLAN:     Monitor po intake, labs and weights. ASSESSMENT:   Pt is 99% ideal wt; BMI (calculated): 20.8 kg/m2 (normal BMI). Pt appear well nourished. Po intake is fair per I/O's. No nutrition diagnosis at this time. SUBJECTIVE/OBJECTIVE:   Information obtained from: chart review, pt  Pt reports that her appetite is \"okay\". She denies N/V. States she lost 20# over the past 6 months. Upon asking if this weight loss was intentional pt stated \"yes and no\". Pt denies problems with chewing or swallowing. Pt with Chronic back pain, lumbar radiculopathy, flat back syndrome s/p prolonged back surgery 3/29 with blood loss and hypotension.     Diet: regular    Patient Vitals for the past 100 hrs:   % Diet Eaten   03/31/18 1700 50 %   03/31/18 1300 30 %   03/31/18 1000 50 %       Medications: [x]                Reviewed   IVF:  NS at 100 ml/hr    Most Recent POC Glucose:   Recent Labs      04/02/18   0300  04/01/18   0415  03/31/18   1815   GLU  122*  101*  97         Labs:   Lab Results   Component Value Date/Time    Hemoglobin A1c 5.6 02/03/2016 02:36 PM     Lab Results   Component Value Date/Time    Sodium 142 04/02/2018 03:00 AM    Potassium 3.7 04/02/2018 03:00 AM    Chloride 105 04/02/2018 03:00 AM    CO2 32 04/02/2018 03:00 AM    Anion gap 5 04/02/2018 03:00 AM    Glucose 122 (H) 04/02/2018 03:00 AM    BUN 7 04/02/2018 03:00 AM    Creatinine 0.39 (L) 04/02/2018 03:00 AM    Calcium 7.9 (L) 04/02/2018 03:00 AM    Magnesium 2.2 04/02/2018 03:00 AM    Phosphorus 3.2 04/02/2018 03:00 AM    Albumin 2.3 (L) 04/02/2018 03:00 AM       Anthropometrics: IBW : 59 kg (130 lb), % IBW (Calculated): 99.08 %, BMI (calculated): 20.8  Wt Readings from Last 1 Encounters:   02/09/16 65.3 kg (144 lb)      Ht Readings from Last 1 Encounters:   03/29/18 5' 6\" (1.676 m) Estimated Nutrition Needs:  2125 Kcals/day, Protein (g): 70 g Fluid (ml): 1800 ml  Based on:   [x]          Actual BW    []          ABW   []            Adjusted BW      Nutrition Diagnoses:   None at this time. Nutrition Interventions:  None at this time. Goal:   Pt will consume >75% meals by 4/7/18. Weight maintenance (+/- 1-2 kg) by 4/12/18.         Nutrition Monitoring and Evaluation      [x]     Monitor po intake on meal rounds  [x]     Continue inpatient monitoring and intervention  []     Other:      Nutrition Risk:  []   High     [x]  Moderate    []  Minimal/Uncompromised    Samara Winslow RD, CDE   Office:  [address] [place] [address] Pager:  [phone]

## 2018-04-02 NOTE — PROGRESS NOTES
0 - Report received from Ena Hines, [address] [place] [address]. Orders, medications, labs, skin, and neuro exam reviewed. 0700 - Report given to Bright Kimble RN. Orders, medications, labs, skin, and neuro exam reviewed.

## 2018-04-02 NOTE — PROGRESS NOTES
Progress Note      Patient: Tirso Hodge               Sex: female          DOA: 3/29/2018       YOB: 1960      Age:  60 y.o.        LOS:  LOS: 4 days               Subjective:   Pt is dependent on oxycodone as an outpatient . She is more alert today and her pain is better controlled . She remains tachycardic  .       Objective:      Visit Vitals    /71    Pulse (!) 116    Temp 98.5 °F (36.9 °C)    Resp 21    Ht 5' 6\" (1.676 m)    Wt 58.4 kg (128 lb 12.8 oz)    SpO2 98%    BMI 20.79 kg/m2       Physical Exam:  Pt is awake and is alert   heart tachycardia   Lungs fair breath sound bilaterally   Abdomen soft and nontender   Neuro nonfocal .will move all extremities     Lab/Data Reviewed:  BMP:   Lab Results   Component Value Date/Time     04/02/2018 02:10 PM    K 3.9 04/02/2018 02:10 PM     04/02/2018 02:10 PM    CO2 36 (H) 04/02/2018 02:10 PM    AGAP 3 04/02/2018 02:10 PM     (H) 04/02/2018 02:10 PM    BUN 8 04/02/2018 02:10 PM    CREA 0.40 (L) 04/02/2018 02:10 PM    GFRAA >60 04/02/2018 02:10 PM    GFRNA >60 04/02/2018 02:10 PM     CMP:   Lab Results   Component Value Date/Time     04/02/2018 02:10 PM    K 3.9 04/02/2018 02:10 PM     04/02/2018 02:10 PM    CO2 36 (H) 04/02/2018 02:10 PM    AGAP 3 04/02/2018 02:10 PM     (H) 04/02/2018 02:10 PM    BUN 8 04/02/2018 02:10 PM    CREA 0.40 (L) 04/02/2018 02:10 PM    GFRAA >60 04/02/2018 02:10 PM    GFRNA >60 04/02/2018 02:10 PM    CA 8.2 (L) 04/02/2018 02:10 PM    MG 2.2 04/02/2018 03:00 AM    PHOS 3.2 04/02/2018 03:00 AM    ALB 2.4 (L) 04/02/2018 02:10 PM    TP 4.5 (L) 04/02/2018 02:10 PM    GLOB 2.1 04/02/2018 02:10 PM    AGRAT 1.1 04/02/2018 02:10 PM    SGOT 26 04/02/2018 02:10 PM    ALT 22 04/02/2018 02:10 PM     CBC:   Lab Results   Component Value Date/Time    WBC 7.5 04/02/2018 03:00 AM    HGB 8.3 (L) 04/02/2018 03:00 AM    HCT 26.1 (L) 04/02/2018 03:00 AM     04/02/2018 03:00 AM Assessment/Plan     Active Problems:    Lumbar radiculopathy (3/29/2018)      Radiculopathy, thoracic region (3/29/2018)      Acquired flat back syndrome (3/29/2018)        Plan: pt is slowly improving . Pain is better controlled . she is off pressors .

## 2018-04-02 NOTE — PROGRESS NOTES
Physical Therapy Screening:  Services are indicated and therapy order is required. Clarify activity level     An InBasket screening referral was triggered for physical therapy based on results obtained during the nursing admission assessment. The patients chart was reviewed and the patient is appropriate for a skilled therapy evaluation. Please order a consult for physical therapy if you are in agreement and would like an evaluation to be completed. Thank you.     Mateus Moreno, PT

## 2018-04-02 NOTE — PROGRESS NOTES
Neurosurgery Progress Note  As anticipated pain control is an issue: at home taking 20mg of oxycodone every 3 hours, now on neurontin oxycodone every 4 hours flexeril tordol and iv dilaudid requiring pressors-low dose to maintain blood pressure.    Drains are out right thigh is numb, motor functio is 5.5  hct today is 26% slightly up  Wound looks good  A:  Clinical course has been what has been anticipated  Coding requests clarification of anemia: intraoperative blood loss  P; supportive, mobilize today if possible, continue icu observation

## 2018-04-02 NOTE — PROGRESS NOTES
[facility]. Pulmonary, Critical Care, and Sleep Medicine    Name: Tonya Harrell MRN: [id number]   : 1960 Hospital: [facility]   Date: 2018  Admission Date: 3/29/2018     Chart and notes reviewed. Data reviewed. I have evaluated all findings. [x]I have reviewed the flowsheet and previous days notes. []The patient is critically ill on      []Mechanical ventilation [x]Pressors   []BiPAP []                 : Requiring low dose levophed, reports anterior thigh numbness, still with pain    ROS:Review of systems not obtained due to patient factors. Events and notes from last 24 hours reviewed. Care plan discussed on multidisciplinary rounds.   Patient Active Problem List   Diagnosis Code    Chronic low back pain M54.5, G89.29    Degeneration of lumbar or lumbosacral intervertebral disc M51.37    Encounter for long-term (current) use of high-risk medication Z79.899    Myalgia and myositis, unspecified [id number]    Spasm of muscle M62.838    Lumbar stenosis M48.061    Hypertension I10    Osteoarthritis of right hip M16.11    Osteoarthritis of left hip M16.12    Lumbar radiculopathy M54.16    Radiculopathy, thoracic region M54.14    Acquired flat back syndrome M40.30       Vital Signs:  Visit Vitals    /71    Pulse 74    Temp 97.8 °F (36.6 °C)    Resp 15    Ht 5' 6\" (1.676 m)    Wt 58.4 kg (128 lb 12.8 oz)    SpO2 96%    BMI 20.79 kg/m2       O2 Device: Nasal cannula   O2 Flow Rate (L/min): 2 l/min   Temp (24hrs), Av.1 °F (36.7 °C), Min:97.3 °F (36.3 °C), Max:99.5 °F (37.5 °C)       Intake/Output:   Last shift:         Last 3 shifts: 1901 -  07  In: 4589.2 [P.O.:510; I.V.:4079.2]  Out: 6220 [Urine:6220]    Intake/Output Summary (Last 24 hours) at 18 0745  Last data filed at 18 0700   Gross per 24 hour   Intake          2843.77 ml   Output             3195 ml   Net          -351.23 ml      Ventilator Settings: Current Facility-Administered Medications   Medication Dose Route Frequency    potassium chloride 20 mEq in 50 ml IVPB  20 mEq IntraVENous ONCE    gabapentin (NEURONTIN) capsule 400 mg  400 mg Oral TID    NOREPINephrine (LEVOPHED) 8 mg in 0.9% NS 250ml infusion  2-16 mcg/min IntraVENous TITRATE    ciprofloxacin HCl (CIPRO) tablet 500 mg  500 mg Oral Q12H    sodium chloride (NS) flush 10 mL  10 mL InterCATHeter Q24H    sodium chloride (NS) flush 10-40 mL  10-40 mL InterCATHeter Q8H    sodium chloride (NS) flush 20 mL  20 mL InterCATHeter Q24H    0.9% sodium chloride infusion  100 mL/hr IntraVENous CONTINUOUS    DULoxetine (CYMBALTA) capsule 60 mg  60 mg Oral BID    sodium chloride (NS) flush 5-10 mL  5-10 mL IntraVENous Q8H    magnesium hydroxide (MILK OF MAGNESIA) 400 mg/5 mL oral suspension 30 mL  30 mL Oral DAILY    sodium chloride (NS) flush 5-10 mL  5-10 mL IntraVENous Q8H    influenza vaccine 2017-18 (3 yrs+)(PF) (FLUZONE QUAD/FLUARIX QUAD) injection 0.5 mL  0.5 mL IntraMUSCular PRIOR TO DISCHARGE       Telemetry:     Physical Exam:   General: Awake, alert   HEENT: Normal   Neck: Supple   Chest: Normal   Lungs: CTA bilaterally   Heart: RRR   Abdomen: Soft non tender, non distended   Extremity: Moves all   Neuro: Numbness anterior right thigh and knee   Skin: Intact   DATA:  MAR reviewed and pertinent medications noted or modified as needed    Labs:  Recent Labs      04/02/18   0300  04/01/18   0415  03/31/18   1815   WBC  7.5  8.0  7.4   HGB  8.3*  8.4*  7.7*   HCT  26.1*  25.8*  23.4*   PLT  198  150  118*     Recent Labs      04/02/18   0300  04/01/18   0415  03/31/18   1815   NA  142  143  142   K  3.7  4.4  3.8   CL  105  109*  108   CO2  32  31  30   GLU  122*  101*  97   BUN  7  8  14   CREA  0.39*  0.36*  0.46*   CA  7.9*  7.8*  7.4*   MG  2.2  2.2  2.2   PHOS  3.2  3.6  2.1*   ALB  2.3*  2.4*   --    SGOT  25  33   --    ALT  22  23   --      No results for input(s): PH, PCO2, PO2, HCO3, FIO2 in the last 72 hours. No results for input(s): FIO2I, IFO2, HCO3I, IHCO3, HCOPOC, PCO2I, PCOPOC, IPHI, PHI, PHPOC, PO2I, PO2POC in the last 72 hours. No lab exists for component: IPOC2  Imaging:  [x]                           I have personally reviewed the patients radiographs and reports    High complexity decision making was performed during this consultation and evaluation. [x]       Pt is at high risk for further organ failure and dysfunction. Critical care time spent  minutes with patient exclusive of procedures. IMPRESSION:   Chronic low back pain M54.5, G89.29     Degeneration of lumbar or lumbosacral intervertebral disc M51.37    Encounter for long-term (current) use of high-risk medication Z79.899    Myalgia and myositis, unspecified [id number]    Spasm of muscle M62.838    Lumbar stenosis M48.061    Hypertension I10    Osteoarthritis of right hip M16.11    Osteoarthritis of left hip M16.12    Lumbar radiculopathy M54.16    Radiculopathy, thoracic region M54.14    Acquired flat back syndrome M40.30 ·      · Chronic back pain, lumbar radiculopathy, flat back syndrome s/p prolonged back surgery 3/29   · Acute blood loss anemia in setting of above surgery-hgb 6.2-receiving 2uPRBC  · Hypotension due to above as well as pain medication  · E.  Faecalis UTI  · Hx palpitations ·         PLAN:   Neuro/Pain  Awake, alert, no weakness  Numbness to right anterior thigh and knee per pt x 24 hrs  Reports pain, multiple PRNs including roxicodone, toradol, dilaudid, flexeril  NS following    Cardiovascular  Currently on low dose levophed, will wean to off if able  Toprol on hold due to hypotension    Pulmonary  No acute issues  Continue incentive spirometry    GI  Regular diet    Renal  No acute issues    ID  Urine cx 3/28 with E Faecalis, sensitive to cipro  Completed course of clinda 3/30    Heme  Acute intraop blood loss anemia  Hb stable x 24 hours           Sean Zapata MD

## 2018-04-02 NOTE — MANAGEMENT PLAN
Discharge/Transition Planning    Care Management following. Need PT and Ot evaluations asap to determine level of functioning so an appropriate discharge plan can be made. May end up needing SNF rehab or an ARU the longer pt remains in bed.  Pt/Family did not want to decide until pt DILCIA Smith RN BSN  Outcomes Manager  Pager # [phone]

## 2018-04-02 NOTE — PROGRESS NOTES
Problem: Falls - Risk of  Goal: *Absence of Falls  Document Ivania Fall Risk and appropriate interventions in the flowsheet.    Outcome: Progressing Towards Goal  Fall Risk Interventions:  Mobility Interventions: Bed/chair exit alarm         Medication Interventions: Bed/chair exit alarm    Elimination Interventions: Bed/chair exit alarm

## 2018-04-03 LAB
ALBUMIN SERPL-MCNC: 2.2 G/DL (ref 3.4–5)
ALBUMIN/GLOB SERPL: 1.1 {RATIO} (ref 0.8–1.7)
ALP SERPL-CCNC: 77 U/L (ref 45–117)
ALT SERPL-CCNC: 20 U/L (ref 13–56)
ANION GAP SERPL CALC-SCNC: 6 MMOL/L (ref 3–18)
AST SERPL-CCNC: 19 U/L (ref 15–37)
BASOPHILS # BLD: 0 K/UL (ref 0–0.06)
BASOPHILS NFR BLD: 0 % (ref 0–2)
BILIRUB SERPL-MCNC: 0.2 MG/DL (ref 0.2–1)
BUN SERPL-MCNC: 8 MG/DL (ref 7–18)
BUN/CREAT SERPL: 17 (ref 12–20)
CALCIUM SERPL-MCNC: 8.1 MG/DL (ref 8.5–10.1)
CHLORIDE SERPL-SCNC: 104 MMOL/L (ref 100–108)
CO2 SERPL-SCNC: 32 MMOL/L (ref 21–32)
CREAT SERPL-MCNC: 0.46 MG/DL (ref 0.6–1.3)
DIFFERENTIAL METHOD BLD: ABNORMAL
EOSINOPHIL # BLD: 0.5 K/UL (ref 0–0.4)
EOSINOPHIL NFR BLD: 9 % (ref 0–5)
ERYTHROCYTE [DISTWIDTH] IN BLOOD BY AUTOMATED COUNT: 15.5 % (ref 11.6–14.5)
GLOBULIN SER CALC-MCNC: 2 G/DL (ref 2–4)
GLUCOSE SERPL-MCNC: 103 MG/DL (ref 74–99)
HCT VFR BLD AUTO: 24 % (ref 35–45)
HGB BLD-MCNC: 7.5 G/DL (ref 12–16)
LYMPHOCYTES # BLD: 1.5 K/UL (ref 0.9–3.6)
LYMPHOCYTES NFR BLD: 25 % (ref 21–52)
MAGNESIUM SERPL-MCNC: 2.4 MG/DL (ref 1.6–2.6)
MCH RBC QN AUTO: 28.4 PG (ref 24–34)
MCHC RBC AUTO-ENTMCNC: 31.3 G/DL (ref 31–37)
MCV RBC AUTO: 90.9 FL (ref 74–97)
MONOCYTES # BLD: 0.7 K/UL (ref 0.05–1.2)
MONOCYTES NFR BLD: 11 % (ref 3–10)
NEUTS SEG # BLD: 3.3 K/UL (ref 1.8–8)
NEUTS SEG NFR BLD: 55 % (ref 40–73)
PHOSPHATE SERPL-MCNC: 3.4 MG/DL (ref 2.5–4.9)
PLATELET # BLD AUTO: 182 K/UL (ref 135–420)
PMV BLD AUTO: 8.7 FL (ref 9.2–11.8)
POTASSIUM SERPL-SCNC: 4.4 MMOL/L (ref 3.5–5.5)
PROT SERPL-MCNC: 4.2 G/DL (ref 6.4–8.2)
RBC # BLD AUTO: 2.64 M/UL (ref 4.2–5.3)
SODIUM SERPL-SCNC: 142 MMOL/L (ref 136–145)
WBC # BLD AUTO: 5.9 K/UL (ref 4.6–13.2)

## 2018-04-03 PROCEDURE — 74011250637 HC RX REV CODE- 250/637: Performed by: PHYSICIAN ASSISTANT

## 2018-04-03 PROCEDURE — 80053 COMPREHEN METABOLIC PANEL: CPT | Performed by: INTERNAL MEDICINE

## 2018-04-03 PROCEDURE — 74011250637 HC RX REV CODE- 250/637: Performed by: HOSPITALIST

## 2018-04-03 PROCEDURE — 65610000006 HC RM INTENSIVE CARE

## 2018-04-03 PROCEDURE — 97535 SELF CARE MNGMENT TRAINING: CPT

## 2018-04-03 PROCEDURE — 97165 OT EVAL LOW COMPLEX 30 MIN: CPT

## 2018-04-03 PROCEDURE — 77010033678 HC OXYGEN DAILY

## 2018-04-03 PROCEDURE — 97530 THERAPEUTIC ACTIVITIES: CPT

## 2018-04-03 PROCEDURE — 74011250637 HC RX REV CODE- 250/637: Performed by: NEUROLOGICAL SURGERY

## 2018-04-03 PROCEDURE — 77030020263 HC SOL INJ SOD CL0.9% LFCR 1000ML

## 2018-04-03 PROCEDURE — 83735 ASSAY OF MAGNESIUM: CPT | Performed by: INTERNAL MEDICINE

## 2018-04-03 PROCEDURE — 74011250636 HC RX REV CODE- 250/636: Performed by: NEUROLOGICAL SURGERY

## 2018-04-03 PROCEDURE — 85025 COMPLETE CBC W/AUTO DIFF WBC: CPT | Performed by: INTERNAL MEDICINE

## 2018-04-03 PROCEDURE — 97162 PT EVAL MOD COMPLEX 30 MIN: CPT

## 2018-04-03 PROCEDURE — 84100 ASSAY OF PHOSPHORUS: CPT | Performed by: INTERNAL MEDICINE

## 2018-04-03 RX ORDER — IBUPROFEN 600 MG/1
600 TABLET ORAL EVERY 6 HOURS
Status: DISCONTINUED | OUTPATIENT
Start: 2018-04-03 | End: 2018-04-10 | Stop reason: HOSPADM

## 2018-04-03 RX ORDER — HYDROMORPHONE HYDROCHLORIDE 2 MG/1
8 TABLET ORAL
Status: DISCONTINUED | OUTPATIENT
Start: 2018-04-03 | End: 2018-04-10 | Stop reason: HOSPADM

## 2018-04-03 RX ORDER — ACETAMINOPHEN 500 MG
500 TABLET ORAL EVERY 6 HOURS
Status: DISCONTINUED | OUTPATIENT
Start: 2018-04-03 | End: 2018-04-10 | Stop reason: HOSPADM

## 2018-04-03 RX ORDER — OXYCODONE HYDROCHLORIDE 5 MG/1
30 TABLET ORAL
Status: DISCONTINUED | OUTPATIENT
Start: 2018-04-03 | End: 2018-04-10 | Stop reason: HOSPADM

## 2018-04-03 RX ORDER — KETOROLAC TROMETHAMINE 10 MG/1
10 TABLET, FILM COATED ORAL
Status: DISCONTINUED | OUTPATIENT
Start: 2018-04-03 | End: 2018-04-03

## 2018-04-03 RX ADMIN — PANTOPRAZOLE SODIUM 40 MG: 40 TABLET, DELAYED RELEASE ORAL at 07:39

## 2018-04-03 RX ADMIN — Medication 10 ML: at 14:18

## 2018-04-03 RX ADMIN — CYCLOBENZAPRINE HYDROCHLORIDE 10 MG: 10 TABLET, FILM COATED ORAL at 22:49

## 2018-04-03 RX ADMIN — Medication 10 ML: at 21:29

## 2018-04-03 RX ADMIN — ACETAMINOPHEN 500 MG: 500 TABLET ORAL at 19:44

## 2018-04-03 RX ADMIN — IBUPROFEN 600 MG: 200 TABLET, FILM COATED ORAL at 19:44

## 2018-04-03 RX ADMIN — Medication 20 ML: at 17:25

## 2018-04-03 RX ADMIN — KETOROLAC TROMETHAMINE 30 MG: 30 INJECTION, SOLUTION INTRAMUSCULAR at 14:12

## 2018-04-03 RX ADMIN — Medication 10 ML: at 07:39

## 2018-04-03 RX ADMIN — DULOXETINE HYDROCHLORIDE 60 MG: 60 CAPSULE, DELAYED RELEASE ORAL at 17:24

## 2018-04-03 RX ADMIN — Medication 10 ML: at 17:24

## 2018-04-03 RX ADMIN — CIPROFLOXACIN HYDROCHLORIDE 500 MG: 500 TABLET, FILM COATED ORAL at 09:37

## 2018-04-03 RX ADMIN — Medication 10 ML: at 07:40

## 2018-04-03 RX ADMIN — OXYCODONE HYDROCHLORIDE 30 MG: 5 TABLET ORAL at 13:09

## 2018-04-03 RX ADMIN — GABAPENTIN 400 MG: 400 CAPSULE ORAL at 22:46

## 2018-04-03 RX ADMIN — GABAPENTIN 400 MG: 400 CAPSULE ORAL at 15:10

## 2018-04-03 RX ADMIN — OXYCODONE HYDROCHLORIDE 30 MG: 5 TABLET ORAL at 09:37

## 2018-04-03 RX ADMIN — DULOXETINE HYDROCHLORIDE 60 MG: 60 CAPSULE, DELAYED RELEASE ORAL at 09:37

## 2018-04-03 RX ADMIN — KETOROLAC TROMETHAMINE 30 MG: 30 INJECTION, SOLUTION INTRAMUSCULAR at 02:08

## 2018-04-03 RX ADMIN — MAGNESIUM HYDROXIDE 30 ML: 400 SUSPENSION ORAL at 09:37

## 2018-04-03 RX ADMIN — OXYCODONE HYDROCHLORIDE 30 MG: 5 TABLET ORAL at 04:49

## 2018-04-03 RX ADMIN — CYCLOBENZAPRINE HYDROCHLORIDE 10 MG: 10 TABLET, FILM COATED ORAL at 11:13

## 2018-04-03 RX ADMIN — OXYCODONE HYDROCHLORIDE 30 MG: 5 TABLET ORAL at 17:24

## 2018-04-03 RX ADMIN — Medication 10 ML: at 21:30

## 2018-04-03 RX ADMIN — HYDROMORPHONE HYDROCHLORIDE 2 MG: 2 INJECTION INTRAMUSCULAR; INTRAVENOUS; SUBCUTANEOUS at 06:08

## 2018-04-03 RX ADMIN — GABAPENTIN 400 MG: 400 CAPSULE ORAL at 09:37

## 2018-04-03 RX ADMIN — KETOROLAC TROMETHAMINE 30 MG: 30 INJECTION, SOLUTION INTRAMUSCULAR at 07:38

## 2018-04-03 RX ADMIN — HYDROMORPHONE HYDROCHLORIDE 2 MG: 2 INJECTION INTRAMUSCULAR; INTRAVENOUS; SUBCUTANEOUS at 16:03

## 2018-04-03 RX ADMIN — OXYCODONE HYDROCHLORIDE 30 MG: 5 TABLET ORAL at 22:46

## 2018-04-03 NOTE — PROGRESS NOTES
Shift: Patient continues to require pain medication and repositioning frequently for pain control. Neuro exams stable with c/o RLE numbness which patient reported to Dr. Ibeth Funk this am. Levophed titrated off this shift. Bedside and Verbal shift change report given to Gino Franco (oncoming nurse) by Nadeen Saunders RN   (offgoing nurse). Report included the following information SBAR, Kardex, Intake/Output and MAR.

## 2018-04-03 NOTE — PROGRESS NOTES
Progress Note      Patient: Ross Hollins               Sex: female          DOA: 3/29/2018       YOB: 1960      Age:  60 y.o.        LOS:  LOS: 5 days               Subjective:   Continues to be more alert on a daily basis . The difficulty will be her baseline pain which requires her to be on high doses of analgesics as noted in the nursing notes . She is more alert . Weaning her off the analgesics will be difficult      Objective:      Visit Vitals    /87    Pulse (!) 103    Temp 97.4 °F (36.3 °C)    Resp 14    Ht 5' 6\" (1.676 m)    Wt 58.4 kg (128 lb 12.8 oz)    SpO2 97%    BMI 20.79 kg/m2       Physical Exam:  Pt is alert and  Has been able to  Sit on the side of the bed . Main complaint today was numbness of the right lateral thigh . Heart reg rate and rhythm   Lungs fair breath sounds heard   Abdomen soft and nontender   Neuro lifts up both legs off the bed .        Lab/Data Reviewed:  BMP:   Lab Results   Component Value Date/Time     04/03/2018 03:55 AM    K 4.4 04/03/2018 03:55 AM     04/03/2018 03:55 AM    CO2 32 04/03/2018 03:55 AM    AGAP 6 04/03/2018 03:55 AM     (H) 04/03/2018 03:55 AM    BUN 8 04/03/2018 03:55 AM    CREA 0.46 (L) 04/03/2018 03:55 AM    GFRAA >60 04/03/2018 03:55 AM    GFRNA >60 04/03/2018 03:55 AM     CMP:   Lab Results   Component Value Date/Time     04/03/2018 03:55 AM    K 4.4 04/03/2018 03:55 AM     04/03/2018 03:55 AM    CO2 32 04/03/2018 03:55 AM    AGAP 6 04/03/2018 03:55 AM     (H) 04/03/2018 03:55 AM    BUN 8 04/03/2018 03:55 AM    CREA 0.46 (L) 04/03/2018 03:55 AM    GFRAA >60 04/03/2018 03:55 AM    GFRNA >60 04/03/2018 03:55 AM    CA 8.1 (L) 04/03/2018 03:55 AM    MG 2.4 04/03/2018 03:55 AM    PHOS 3.4 04/03/2018 03:55 AM    ALB 2.2 (L) 04/03/2018 03:55 AM    TP 4.2 (L) 04/03/2018 03:55 AM    GLOB 2.0 04/03/2018 03:55 AM    AGRAT 1.1 04/03/2018 03:55 AM    SGOT 19 04/03/2018 03:55 AM    ALT 20 04/03/2018 03:55 AM     CBC:   Lab Results   Component Value Date/Time    WBC 5.9 04/03/2018 03:55 AM    HGB 7.5 (L) 04/03/2018 03:55 AM    HCT 24.0 (L) 04/03/2018 03:55 AM     04/03/2018 03:55 AM           Assessment/Plan     Principal Problem:    Lumbar radiculopathy (3/29/2018)    Active Problems:    Radiculopathy, thoracic region (3/29/2018)      Acquired flat back syndrome (3/29/2018)  Analgesic dependence . Plan: continue to encourage the pt to stand up and become more mobile h/h is 7.5/ 24.0.  Will need to watch the h/h closely

## 2018-04-03 NOTE — PROGRESS NOTES
1900> Assumed care from [facility]. Patient is awake and alert, dilaudid was just given by off-going RN.     0000> Pt verbalized pain better. 0400> No distress noted. 0600> Assisted patient to transfer to recliner, then back to bed. Patient felt a little dizzy but tolerated well.   0720> Bedside and Verbal shift change report given to SARAH Polo (oncoming nurse) by Norma Broderick RN (offgoing nurse). Report included the following information SBAR, Kardex, Intake/Output, MAR and Cardiac Rhythm NSR   .

## 2018-04-03 NOTE — PROGRESS NOTES
Problem: Mobility Impaired (Adult and Pediatric)  Goal: *Acute Goals and Plan of Care (Insert Text)  Physical Therapy Goals  Initiated 4/3/2018 and to be accomplished within 7 day(s)  1. Patient will move from supine to sit and sit to supine  in bed with modified independence. 2.  Patient will maintain seated at edge of bed for 10 min with modified independence to prepare for out of bed activity. 3.  Patient will perform sit to stand with modified independence. 4.  Patient will transfer from bed to chair and chair to bed with modified independence using the least restrictive device. 5.  Patient will ambulate with modified independence for 150 feet with the least restrictive device. 6.  Patient will ascend/descend 12 stairs with handrail(s) with modified independence. 7. Patient will verbalize 3/3 lumbar precautions. 8. Patient will demonstrate compliance with lumbar precautions greater than 90% of session. 9. Patient will demonstrate appropriate use of incentive spirometer to aid in pulomnary compliance for mobility. Outcome: Progressing Towards Goal  PHYSICAL THERAPY: Initial Assessment   INPATIENT: Medicare: Hospital Day: 6     Patient: Ross Hollins (56 y.o. female)    Date: 4/3/2018  Primary Diagnosis: thoracic lumbar radiculopathy   m54.16  m54.14  Radiculopathy, thoracic region  Lumbar radiculopathy  Acquired flat back syndrome   Procedure(s) (LRB):  exposure of previous t10 - l3 fusion/ removal of hardware l3 pedicle subtraction osteotomy instrumentation ilium tto t10, bilateral duralrepair (N/A)  instrumentation ilium to possible t4 (N/A), 5 Days Post-Op   Precautions: Back brace and Spinal  PLOF: Independent   ASSESSMENT :  Patient requires between minimal assistance/contact guard assist and supervision/set-up for bed mobility, transfers and ambulation. Educated on lumbar precautions and role of brace. SARAH Polo present for education on brace fit.  Min A for supine to sit for line management. Seated EOB with supervision for balance. Reports severe right thigh pain \"burning\" with seated. Donned brace with min A. Maintained sitting EOB for 3 minutes with supervision. Sit to stand with min A. Reports increase in RLE pain with standing; relieved with offloading. Min A initially for balance with WB BLE. Once transitioned to self- selected PWB on RLE requires supervision for standing balance. Returned to seated at EOB post 1 minutes standing. Educated in [address] [place] [address] to chair and benefits of mobility. Refused OOB to chair d/t pain. Request to return to bed. Supervision for sit to supine. All needs in reach. RN Omi William present. Discharge rec to rehab at this time d/t patient requiring physical assistance for mobility, has 12 steps to bedroom, and was independent prior to admission. May progress to home health with mobility progression and amb assessment. Patient presents with deficits in:  Bed Mobility, Transfers, Gait, Strength, Balance, Stairs and Precautions    Patient will benefit from skilled intervention to address the above impairments.   Patients rehabilitation potential is considered to be Fair  Factors which may influence rehabilitation potential include:   []         None noted  []         Mental ability/status  [x]         Medical condition  []         Home/family situation and support systems  []         Safety awareness  [x]         Pain tolerance/management  []         Other:      PLAN :  Recommendations and Planned Interventions:  [x]           Bed Mobility Training             [x]    Neuromuscular Re-Education  [x]           Transfer Training                   []    Orthotic/Prosthetic Training  [x]           Gait Training                          []    Modalities  [x]           Therapeutic Exercises          []    Edema Management/Control  [x]           Therapeutic Activities            [x]    Patient and Family Training/Education  []           Other (comment):    Recommendations for the next treatment session: OOB to chair  Frequency/Duration: Patient will be followed by physical therapy 1-2 times per day/4-7 days per week to address goals. Discharge Recommendations: Rehab pending progression  Further Equipment Recommendations for Discharge: rolling walker     SUBJECTIVE:   Patient stated I don't think I can move.     OBJECTIVE DATA SUMMARY:     Past Medical History:   Diagnosis Date    Acute sinusitis     Adverse effect of anesthesia     chronic pain patient with difficulty controlling pain postoperatively    Arrhythmia     palpatation    Chronic pain     low back    DDD (degenerative disc disease)     Heart palpitations     Neuropathy     Osteoarthritis of left hip 2/4/2016    Osteoarthritis of right hip 8/30/2015     Past Surgical History:   Procedure Laterality Date    [facility] SURGERY  1985,1987,2002,2005 ,,[id number]    x8    HX BUNIONECTOMY Left 2011    HX DILATION AND CURETTAGE      HX ORTHOPAEDIC  9-2015    right hip replacement    TOTAL HIP ARTHROPLASTY  2016    left hip     Barriers to Learning/Limitations: None  Compensate with: visual, verbal, tactile, kinesthetic cues/model    G CODES:Mobility  Current  CL= 60-79%   Goal  CI= 1-19%. The severity rating is based on the Other [employer] Balance Scale 1+/5    Eval Complexity: History: MEDIUM  Complexity : 1-2 comorbidities / personal factors will impact the outcome/ POC Exam:MEDIUM Complexity : 3 Standardized tests and measures addressing body structure, function, activity limitation and / or participation in recreation  Presentation: MEDIUM Complexity : Evolving with changing characteristics  Clinical Decision Making:Medium Complexity [facility] Standing Balance Scale 1+/5 Overall Complexity:MEDIUM    [employer] Balance Scale 1+/5  0: Pt performs 25% or less of standing activity (Max assist) CN, 100% impaired.   1: Pt supports self with upper extremities but requires therapist assistance. Pt performs 25-50% of effort (Mod assist) CM, 80% to <100% impaired. 1+: Pt supports self with upper extremities but requires therapist assistance. Pt performs >50% effort. (Min assist). CL, 60% to <80% impaired. 2: Pt supports self independently with both upper extremities (walker, crutches, parallel bars). CL, 60% to <80% impaired. 2+: Pt support self independently with 1 upper extremity (cane, crutch, 1 parallel bar). CK, 40% to <60% impaired. 3: Pt stands without upper extremity support for up to 30 seconds. CK, 40% to <60% impaired. 3+: Pt stands without upper extremity support for 30 seconds or greater. CJ, 20% to <40% impaired. 4: Pt independently moves and returns center of gravity 1-2 inches in one plane. CJ, 20% to <40% impaired. 4+: Pt independently moves and returns center of gravity 1-2 inches in multiple planes. CI, 1% to <20% impaired. 5: Pt independently moves and returns center of gravity in all planes greater than 2 inches. CH, 0% impaired.     Prior Level of Function/Home Situation: Independent  Home Situation  Home Environment: Private residence  # Steps to Enter: 2  One/Two Story Residence: Two story  # of Interior Steps: 12  Living Alone: No  Support Systems: Spouse/Significant Other/Partner  Patient Expects to be Discharged to[de-identified] Private residence  Current DME Used/Available at Home: Grab bars  Tub or Shower Type: Tub/Shower combination (no grab bars or shower chair)  Critical Behavior:  Neurologic State: Alert  Orientation Level: Oriented to person;Oriented to place  Cognition: Follows commands  Safety/Judgement: Fall prevention  Psychosocial  Patient Behaviors: Cooperative    Manual Muscle Testing (LE)         R     L    Hip Flexion:   3+/5  3+/5  Knee EXT:   4+/5  4+/5  Knee FLEX:   4+/5  4+/5  Ankle DF:   4+/5  4+/5  _________________________________________________   Tone :  Normal BLE  Sensation:  Intact to light touch; reports numbness RLE hip to knee  Range Of Motion: BLE AROM WFL    Functional Mobility:      Functional Status      Indep   (I)   Mod I   Super-vision   Stand-by Assist   Contact Guard   Min A   Mod A   Max A   Total A   Assist x2 Verbal cues Additional time Not tested   Comments   Rolling []  []  [x] []  []    []    []    []  []  [] [] [] []    Supine to sit []  []  [] []  []  [x]  []  []  []  [] [] [] []    Sit to supine []  []  [] []  []  [x]  []  []  []  [] [] [] []    Sit to stand []  []  [] []  []  [x]  []  []  []  [] [] [] []    Stand to sit []  []  [] []  []  [x]  []  []  []  [] [] [] []    Bed to chair transfers []  []  [] []  []  []  []  []  []  [] [] [] [x]        Balance    Good   Fair   Poor   Unable   Not tested   Comments   Sitting static []  [x]  []  []  []    Sitting dynamic []  [x]  []  []  []    Standing static []  [x]  []  []  []    Standing dynamic []  []  []  []  [x]        Therapeutic Exercises:   Sit to stand    Pain:  Pre treatment pain level: 8  Post treatment pain level: 8    Activity Tolerance:   Fair    Please refer to the flowsheet for vital signs taken during this treatment. After treatment:   []         Patient left in no apparent distress sitting up in chair  [x]         Patient left in no apparent distress in bed  [x]         Call bell left within reach  [x]         Nursing notified and present  []         Caregiver present  []         Bed alarm activated    COMMUNICATION/EDUCATION:   [x]         Fall prevention education was provided and the patient/caregiver indicated understanding. [x]         Patient/family have participated as able in goal setting and plan of care. [x]         Patient/family agree to work toward stated goals and plan of care. []         Patient understands intent and goals of therapy, but is neutral about his/her participation. []         Patient is unable to participate in goal setting and plan of care.     Patient educated on the role of physical therapy during the acute stay  and the importance of mobility. VU.       Thank you for this referral.  Vickie Brewster, PT   Time Calculation: 19 mins

## 2018-04-03 NOTE — PROGRESS NOTES
1700 Patient continues to have pain mgmt issues today. Patient was able to sit on side of bed and stand up on  three separate occasions in early part of shift with minimal assist to sitting position. Back brace adjusted by PT for better fit. Patient continues to c/o numbness in right thigh which she stated was present prior to surgery but when standing with PT she developed a burning sensation while bearing weight in the RLE. Goal today was to not use IV dilaudid for pain but patient reported persistent pain at 9/10 even after all PRN pain meds were given. Patient's sister came to nursing station and reported that her pain was affecting her ability to take in deep breaths and that she was concerned patient would \"develop pneumonia. \" 2 mg IV dilaudid given at 1600. Bedside and Verbal shift change report given to Yara Mena RN (oncoming nurse) by Emily Palma RN   (offgoing nurse). Report included the following information SBAR, Kardex, Intake/Output and MAR.

## 2018-04-03 NOTE — PROGRESS NOTES
Neurosurgery Progress Note; post op day 5  s progressing, sitting up not yet made it out of bed  Mother who had been very sick and in hospice passed away this am  Off pressors, received 8 mg of iv dilaudid yesterday-she will try to limit this today also taking tordol, flexeril and oxycodone ir 240 mg a day: alert  O; hct 24% wound looks good right leg medial thigh is numb no headache  Motor function is 5.5 aguilar in place  A; I do not feel comfortable sending patient from the icu to the floor with this level of narcotics  P: dc lauren   I will speak to   Dr. [place]ia Gilbert her pain doctor and discuss medication s  Pt ot  Probably to the floor tomorrw.

## 2018-04-03 NOTE — PROGRESS NOTES
Problem: Self Care Deficits Care Plan (Adult)  Goal: *Acute Goals and Plan of Care (Insert Text)  Occupational Therapy Goals  Initiated 4/3/2018 within 7 day(s). 1.  Patient will perform grooming tasks while standing with modified independence. 2.  Patient will perform lower body dressing with modified independence utilizing adaptive equipment, prn.  3.  Patient will perform functional task in standing for 8 minutes with modified independence to increase activity tolerance for ADLs. 4.  Patient will perform toilet transfers with modified independence. 5.  Patient will perform all aspects of toileting with modified independence. 6.  Patient will participate in upper extremity therapeutic exercise/activities with modified independence for 8 minutes to increase BUE strength for ADLs. 7.  Patient will utilize energy conservation techniques during functional activities with minimal verbal cues. Outcome: Progressing Towards Goal  Occupational Therapy EVALUATION    Patient: Rylan Roca (81 y.o. female)  Date: 4/3/2018  Primary Diagnosis: thoracic lumbar radiculopathy   m54.16  m54.14  Radiculopathy, thoracic region  Lumbar radiculopathy  Acquired flat back syndrome  Procedure(s) (LRB):  exposure of previous t10 - l3 fusion/ removal of hardware l3 pedicle subtraction osteotomy instrumentation ilium tto t10, bilateral duralrepair (N/A)  instrumentation ilium to possible t4 (N/A) 5 Days Post-Op   Precautions:  Fall, Back, Spinal  PLOF: Pt was independent with basic self care tasks and functional mobility PTA. ASSESSMENT :  Based on the objective data described below, the patient presents with impairments with regard to bed mobility, activity tolerance and independence in ADLs. Pt supine on arrival, c/o 5/10 pain, agreeable to therapy. Demo'd good back precautions & appropriate log roll technique with SBA for bed mobility. Mod/max A to don back brace. Good/fair sitting balance for ~20 mins for ADLs.  Functional transfer from EOB to standing with CGA and skilled instruction on BUE positioning to ensure safety in prep for toilet transfer. Pt reporting 13/10 pain on lateral aspect of R quad in standing; reporting it as severe \"burning pain. \" Pt deferred further tx. /70 at end of session. Pt reporting dizziness t/o session (did not worsen or improve with time). Chon Martinez RN aware. Recommend continued therapy. Patient will benefit from skilled intervention to address the above impairments. Patients rehabilitation potential is considered to be Good  Factors which may influence rehabilitation potential include:   []             None noted  []             Mental ability/status  [x]             Medical condition  []             Home/family situation and support systems  []             Safety awareness  []             Pain tolerance/management  []             Other:        PLAN :  Recommendations and Planned Interventions:  [x]               Self Care Training                  [x]        Therapeutic Activities  [x]               Functional Mobility Training    []        Cognitive Retraining  [x]               Therapeutic Exercises           [x]        Endurance Activities  [x]               Balance Training                   []        Neuromuscular Re-Education  []               Visual/Perceptual Training     [x]   Home Safety Training  [x]               Patient Education                 [x]        Family Training/Education  []               Other (comment):    Frequency/Duration: Patient will be followed by occupational therapy 4-7 times a week to address goals. Discharge Recommendations: short term rehab stay vs. Home (pending increased OOB activity)  Further Equipment Recommendations for Discharge: shower chair     SUBJECTIVE:   Patient stated Can I stand up yet.     OBJECTIVE DATA SUMMARY:     Past Medical History:   Diagnosis Date    Acute sinusitis     Adverse effect of anesthesia     chronic pain patient with difficulty controlling pain postoperatively    Arrhythmia     palpatation    Chronic pain     low back    DDD (degenerative disc disease)     Heart palpitations     Neuropathy     Osteoarthritis of left hip 2/4/2016    Osteoarthritis of right hip 8/30/2015     Past Surgical History:   Procedure Laterality Date   [facility] SURGERY  1985,1987,2002,2005 ,,[id number]    x8    HX BUNIONECTOMY Left 2011    HX DILATION AND CURETTAGE      HX ORTHOPAEDIC  9-2015    right hip replacement    TOTAL HIP ARTHROPLASTY  2016    left hip     Barriers to Learning/Limitations: None  Compensate with: visual, verbal, tactile, kinesthetic cues/model    GCODES:  Self Care  Current  CL= 60-79%   Goal  CI= 1-19%. The severity rating is based on the Other Functional Assessment, MMT, ROm    Eval Complexity: History: MEDIUM Complexity : Expanded review of history including physical, cognitive and psychosocial  history ; Examination: LOW Complexity : 1-3 performance deficits relating to physical, cognitive , or psychosocial skils that result in activity limitations and / or participation restrictions ; Decision Making:LOW Complexity : No comorbidities that affect functional and no verbal or physical assistance needed to complete eval tasks     Prior Level of Function/Home Situation: Pt was independent with basic self care tasks and functional mobility PTA. Home Situation  Home Environment: Private residence  # Steps to Enter: 2  One/Two Story Residence: Two story  Living Alone: No  Support Systems: Spouse/Significant Other/Partner  Patient Expects to be Discharged to[de-identified] Private residence  Current DME Used/Available at Home: Grab bars  Tub or Shower Type: Tub/Shower combination (no grab bars or shower chair)  [x]  Right hand dominant   []  Left hand dominant    Cognitive/Behavioral Status:  Neurologic State: Alert  Orientation Level: Oriented X4  Cognition: Follows commands  Safety/Judgement: Fall prevention; Awareness of environment     Skin: Intact (BUEs)  Edema: None noted (BUEs)    Vision/Perceptual:    Acuity: Within Defined Limits      Coordination:  Coordination: Within functional limits (BUEs)  Fine Motor Skills-Upper: Right Intact; Left Intact    Gross Motor Skills-Upper: Right Intact; Left Intact     Balance:  Sitting: Impaired  Sitting - Static: Good (unsupported)  Sitting - Dynamic: Fair (occasional)  Standing: Impaired; With support  Standing - Static: Fair  Standing - Dynamic : Fair     Strength:  Strength: Generally decreased, functional (BUEs: approx 4/5)    Range of Motion:  AROM: Within functional limits (BUEs: full shoulder/elbow flex)    Functional Mobility and Transfers for ADLs:  Bed Mobility:  Supine to Sit: Stand-by assistance  Sit to Supine: Stand-by assistance    Transfers:  Sit to Stand: Contact guard assistance   Toilet Transfer :  (not assessed)    ADL Assessment:  Feeding: Modified independent  Oral Facial Hygiene/Grooming: Modified Independent  Bathing: Maximum assistance  Upper Body Dressing: Minimum assistance  Lower Body Dressing: Maximum assistance  Toileting: Total assistance (aguilar catheter)    ADL Intervention:  Grooming  Grooming Assistance: Supervision/set up;Modified independent  Washing Face: Modified independent  Brushing Teeth: Modified independent  Brushing/Combing Hair: Modified independent  Cues: Verbal cues provided     Pt tolerated ~20 mins at EOB during ADLs with good/fair sitting balance. Mod I/supervision for all ADLs with vc's for pacing & safety. Cognitive Retraining  Safety/Judgement: Fall prevention; Awareness of environment     Therapeutic Activity:  Functional reaching activity at EOB with fair dynamic sitting balance for item retrieval in prep for ADLs. Donned back brace with mod/max A in prep for UB dressing; verbal cues for sequencing.  Functional transfer from EOB to standing with CGA and skilled instruction on BUE positioning to ensure safety in prep for toilet transfer. Pain:  Pre treatment pain level: 5/10  Post treatment pain level: 5/10  Pain Scale 1: Visual    Activity Tolerance:  Fair  Please refer to the flowsheet for vital signs taken during this treatment. After treatment:   [] Patient left in no apparent distress sitting up in chair  [x] Patient left in no apparent distress in bed  [x] Call bell left within reach  [x] Nursing notified  [] Caregiver present  [] Bed alarm activated    COMMUNICATION/EDUCATION: Pt educated on role of OT, POC and home safety. She verbalized understanding. [x] Home safety education was provided and the patient/caregiver indicated understanding. [x] Patient/family have participated as able in goal setting and plan of care. [x] Patient/family agree to work toward stated goals and plan of care. [] Patient understands intent and goals of therapy, but is neutral about his/her participation. [] Patient is unable to participate in goal setting and plan of care.     Thank you for this referral.  Freddy Lucero MS OTR/L  Time Calculation: 35 mins

## 2018-04-03 NOTE — PROGRESS NOTES
Neuosurgery Progress Note; spoke with Dr. Jonnie Worrell of pain management.  He suggested: dilaudid 8 mg po every 8 hours, tordol 30 mg po every 8 hoursneurontin, flexeril and oxyycodone the same discussed with patient

## 2018-04-03 NOTE — PROGRESS NOTES
[facility]. Pulmonary, Critical Care, and Sleep Medicine    Name: Baron Manriquez MRN: [id number]   : 1960 Hospital: [address] [place] [address]   Date: 4/3/2018  Admission Date: 3/29/2018     Chart and notes reviewed. Data reviewed. I have evaluated all findings. [x]I have reviewed the flowsheet and previous days notes. : Requiring low dose levophed, reports anterior thigh numbness, still with pain  4/3: Off levophed, still with thigh numbness otherwise no complaints    ROS:Review of systems not obtained due to patient factors. Events and notes from last 24 hours reviewed. Care plan discussed on multidisciplinary rounds.   Patient Active Problem List   Diagnosis Code    Chronic low back pain M54.5, G89.29    Degeneration of lumbar or lumbosacral intervertebral disc M51.37    Encounter for long-term (current) use of high-risk medication Z79.899    Myalgia and myositis, unspecified [id number]    Spasm of muscle M62.838    Lumbar stenosis M48.061    Hypertension I10    Osteoarthritis of right hip M16.11    Osteoarthritis of left hip M16.12    Lumbar radiculopathy M54.16    Radiculopathy, thoracic region M54.14    Acquired flat back syndrome M40.30       Vital Signs:  Visit Vitals    BP 98/61    Pulse 89    Temp 97.6 °F (36.4 °C)    Resp 16    Ht 5' 6\" (1.676 m)    Wt 58.4 kg (128 lb 12.8 oz)    SpO2 97%    BMI 20.79 kg/m2       O2 Device: Nasal cannula   O2 Flow Rate (L/min): 2 l/min   Temp (24hrs), Av.3 °F (36.8 °C), Min:97.6 °F (36.4 °C), Max:98.9 °F (37.2 °C)       Intake/Output:   Last shift:      701 - 1900  In: -   Out: 507 [SWGAS:526]  Last 3 shifts: 1901 -  07  In: 4338.2 [P.O.:440; I.V.:3898.2]  Out: 3322 [Urine:3322]    Intake/Output Summary (Last 24 hours) at 18 1333  Last data filed at 18 1300   Gross per 24 hour   Intake             1900 ml   Output             1950 ml   Net              -50 ml Ventilator Settings:                Current Facility-Administered Medications   Medication Dose Route Frequency    pantoprazole (PROTONIX) tablet 40 mg  40 mg Oral DAILY    gabapentin (NEURONTIN) capsule 400 mg  400 mg Oral TID    NOREPINephrine (LEVOPHED) 8 mg in 0.9% NS 250ml infusion  2-16 mcg/min IntraVENous TITRATE    sodium chloride (NS) flush 10 mL  10 mL InterCATHeter Q24H    sodium chloride (NS) flush 10-40 mL  10-40 mL InterCATHeter Q8H    sodium chloride (NS) flush 20 mL  20 mL InterCATHeter Q24H    0.9% sodium chloride infusion  100 mL/hr IntraVENous CONTINUOUS    DULoxetine (CYMBALTA) capsule 60 mg  60 mg Oral BID    sodium chloride (NS) flush 5-10 mL  5-10 mL IntraVENous Q8H    magnesium hydroxide (MILK OF MAGNESIA) 400 mg/5 mL oral suspension 30 mL  30 mL Oral DAILY    sodium chloride (NS) flush 5-10 mL  5-10 mL IntraVENous Q8H    influenza vaccine 2017-18 (3 yrs+)(PF) (FLUZONE QUAD/FLUARIX QUAD) injection 0.5 mL  0.5 mL IntraMUSCular PRIOR TO DISCHARGE       Telemetry:     Physical Exam:   General: Awake, alert   HEENT: Normal   Neck: Supple   Chest: Normal   Lungs: CTA bilaterally   Heart: RRR   Abdomen: Soft non tender, non distended   Extremity: Moves all   Neuro: Numbness anterior right thigh and knee   Skin: Intact   DATA:  MAR reviewed and pertinent medications noted or modified as needed    Labs:  Recent Labs      04/03/18   0355  04/02/18   0300  04/01/18   0415   WBC  5.9  7.5  8.0   HGB  7.5*  8.3*  8.4*   HCT  24.0*  26.1*  25.8*   PLT  182  198  150     Recent Labs      04/03/18   0355  04/02/18   1410  04/02/18   0300  04/01/18   0415   NA  142  143  142  143   K  4.4  3.9  3.7  4.4   CL  104  104  105  109*   CO2  32  36*  32  31   GLU  103*  109*  122*  101*   BUN  8  8  7  8   CREA  0.46*  0.40*  0.39*  0.36*   CA  8.1*  8.2*  7.9*  7.8*   MG  2.4   --   2.2  2.2   PHOS  3.4   --   3.2  3.6   ALB  2.2*  2.4*  2.3*  2.4*   SGOT  19  26  25  33   ALT  20  22  22  23     No results for input(s): PH, PCO2, PO2, HCO3, FIO2 in the last 72 hours. No results for input(s): FIO2I, IFO2, HCO3I, IHCO3, HCOPOC, PCO2I, PCOPOC, IPHI, PHI, PHPOC, PO2I, PO2POC in the last 72 hours. No lab exists for component: IPOC2  Imaging:  [x]                           I have personally reviewed the patients radiographs and reports    High complexity decision making was performed during this consultation and evaluation. [x]       Pt is at high risk for further organ failure and dysfunction. Critical care time spent  minutes with patient exclusive of procedures. IMPRESSION:   Chronic low back pain M54.5, G89.29     Degeneration of lumbar or lumbosacral intervertebral disc M51.37    Encounter for long-term (current) use of high-risk medication Z79.899    Myalgia and myositis, unspecified [id number]    Spasm of muscle M62.838    Lumbar stenosis M48.061    Hypertension I10    Osteoarthritis of right hip M16.11    Osteoarthritis of left hip M16.12    Lumbar radiculopathy M54.16    Radiculopathy, thoracic region M54.14    Acquired flat back syndrome M40.30 ·      · Chronic back pain, lumbar radiculopathy, flat back syndrome s/p prolonged back surgery 3/29   · Acute blood loss anemia in setting of above surgery-hgb 6.2-receiving 2uPRBC  · Hypotension due to above as well as pain medication  · E.  Faecalis UTI  · Hx palpitations ·         PLAN:   Neuro/Pain  Awake, alert, no weakness  Numbness to right anterior thigh and knee  Reports pain, multiple PRNs including roxicodone, toradol, dilaudid, flexeril  NS following    Cardiovascular  Off levophed  Hemodynamically stable    Pulmonary  No acute issues  Continue incentive spirometry    GI  Regular diet    Renal  No acute issues    ID  Urine cx 3/28 with E Faecalis, sensitive to cipro  Completes course of cipro today  Completed course of clinda 3/30    Heme  Acute intraop blood loss anemia  Hb with small decrease from yesterday  Anticoagulants on hold  SCDs for ppx    No further critical care needs at this time, will be available as needed          Sadaf Murphy MD

## 2018-04-04 LAB
ALBUMIN SERPL-MCNC: 2.2 G/DL (ref 3.4–5)
ALBUMIN/GLOB SERPL: 1.1 {RATIO} (ref 0.8–1.7)
ALP SERPL-CCNC: 76 U/L (ref 45–117)
ALT SERPL-CCNC: 18 U/L (ref 13–56)
ANION GAP SERPL CALC-SCNC: 6 MMOL/L (ref 3–18)
AST SERPL-CCNC: 16 U/L (ref 15–37)
BASOPHILS # BLD: 0 K/UL (ref 0–0.06)
BASOPHILS NFR BLD: 0 % (ref 0–2)
BILIRUB SERPL-MCNC: 0.2 MG/DL (ref 0.2–1)
BUN SERPL-MCNC: 11 MG/DL (ref 7–18)
BUN/CREAT SERPL: 23 (ref 12–20)
CALCIUM SERPL-MCNC: 8 MG/DL (ref 8.5–10.1)
CHLORIDE SERPL-SCNC: 107 MMOL/L (ref 100–108)
CO2 SERPL-SCNC: 29 MMOL/L (ref 21–32)
CREAT SERPL-MCNC: 0.47 MG/DL (ref 0.6–1.3)
DIFFERENTIAL METHOD BLD: ABNORMAL
EOSINOPHIL # BLD: 0.5 K/UL (ref 0–0.4)
EOSINOPHIL NFR BLD: 8 % (ref 0–5)
ERYTHROCYTE [DISTWIDTH] IN BLOOD BY AUTOMATED COUNT: 15.8 % (ref 11.6–14.5)
GLOBULIN SER CALC-MCNC: 2 G/DL (ref 2–4)
GLUCOSE SERPL-MCNC: 111 MG/DL (ref 74–99)
HCT VFR BLD AUTO: 23.6 % (ref 35–45)
HGB BLD-MCNC: 7.4 G/DL (ref 12–16)
LYMPHOCYTES # BLD: 1.6 K/UL (ref 0.9–3.6)
LYMPHOCYTES NFR BLD: 25 % (ref 21–52)
MAGNESIUM SERPL-MCNC: 2.3 MG/DL (ref 1.6–2.6)
MCH RBC QN AUTO: 28.6 PG (ref 24–34)
MCHC RBC AUTO-ENTMCNC: 31.4 G/DL (ref 31–37)
MCV RBC AUTO: 91.1 FL (ref 74–97)
MONOCYTES # BLD: 0.6 K/UL (ref 0.05–1.2)
MONOCYTES NFR BLD: 10 % (ref 3–10)
NEUTS SEG # BLD: 3.6 K/UL (ref 1.8–8)
NEUTS SEG NFR BLD: 57 % (ref 40–73)
PHOSPHATE SERPL-MCNC: 4 MG/DL (ref 2.5–4.9)
PLATELET # BLD AUTO: 202 K/UL (ref 135–420)
PMV BLD AUTO: 8.7 FL (ref 9.2–11.8)
POTASSIUM SERPL-SCNC: 4.1 MMOL/L (ref 3.5–5.5)
PROT SERPL-MCNC: 4.2 G/DL (ref 6.4–8.2)
RBC # BLD AUTO: 2.59 M/UL (ref 4.2–5.3)
SODIUM SERPL-SCNC: 142 MMOL/L (ref 136–145)
WBC # BLD AUTO: 6.3 K/UL (ref 4.6–13.2)

## 2018-04-04 PROCEDURE — 74011250637 HC RX REV CODE- 250/637: Performed by: HOSPITALIST

## 2018-04-04 PROCEDURE — 77030011256 HC DRSG MEPILEX <16IN NO BORD MOLN -A

## 2018-04-04 PROCEDURE — 74011250637 HC RX REV CODE- 250/637: Performed by: NEUROLOGICAL SURGERY

## 2018-04-04 PROCEDURE — 76450000000

## 2018-04-04 PROCEDURE — 97530 THERAPEUTIC ACTIVITIES: CPT

## 2018-04-04 PROCEDURE — 65610000006 HC RM INTENSIVE CARE

## 2018-04-04 PROCEDURE — 74011250636 HC RX REV CODE- 250/636: Performed by: NEUROLOGICAL SURGERY

## 2018-04-04 PROCEDURE — 85025 COMPLETE CBC W/AUTO DIFF WBC: CPT | Performed by: INTERNAL MEDICINE

## 2018-04-04 PROCEDURE — 77030020263 HC SOL INJ SOD CL0.9% LFCR 1000ML

## 2018-04-04 PROCEDURE — 77010033678 HC OXYGEN DAILY

## 2018-04-04 PROCEDURE — 80053 COMPREHEN METABOLIC PANEL: CPT | Performed by: INTERNAL MEDICINE

## 2018-04-04 PROCEDURE — 83735 ASSAY OF MAGNESIUM: CPT | Performed by: INTERNAL MEDICINE

## 2018-04-04 PROCEDURE — 84100 ASSAY OF PHOSPHORUS: CPT | Performed by: INTERNAL MEDICINE

## 2018-04-04 PROCEDURE — 74011250637 HC RX REV CODE- 250/637

## 2018-04-04 RX ORDER — OXYCODONE HYDROCHLORIDE 5 MG/1
TABLET ORAL
Status: COMPLETED
Start: 2018-04-04 | End: 2018-04-04

## 2018-04-04 RX ADMIN — HYDROMORPHONE HYDROCHLORIDE 8 MG: 2 TABLET ORAL at 12:54

## 2018-04-04 RX ADMIN — IBUPROFEN 600 MG: 200 TABLET, FILM COATED ORAL at 17:29

## 2018-04-04 RX ADMIN — GABAPENTIN 400 MG: 400 CAPSULE ORAL at 08:22

## 2018-04-04 RX ADMIN — SODIUM CHLORIDE 100 ML/HR: 900 INJECTION, SOLUTION INTRAVENOUS at 02:35

## 2018-04-04 RX ADMIN — OXYCODONE HYDROCHLORIDE 30 MG: 5 TABLET ORAL at 06:05

## 2018-04-04 RX ADMIN — GABAPENTIN 400 MG: 400 CAPSULE ORAL at 21:04

## 2018-04-04 RX ADMIN — IBUPROFEN 600 MG: 200 TABLET, FILM COATED ORAL at 12:33

## 2018-04-04 RX ADMIN — KETOROLAC TROMETHAMINE 30 MG: 30 INJECTION, SOLUTION INTRAMUSCULAR at 22:39

## 2018-04-04 RX ADMIN — Medication 10 ML: at 15:29

## 2018-04-04 RX ADMIN — IBUPROFEN 600 MG: 200 TABLET, FILM COATED ORAL at 07:18

## 2018-04-04 RX ADMIN — Medication 10 ML: at 07:18

## 2018-04-04 RX ADMIN — SODIUM CHLORIDE 100 ML/HR: 900 INJECTION, SOLUTION INTRAVENOUS at 20:10

## 2018-04-04 RX ADMIN — Medication 20 ML: at 17:33

## 2018-04-04 RX ADMIN — ACETAMINOPHEN 500 MG: 500 TABLET ORAL at 12:33

## 2018-04-04 RX ADMIN — Medication 10 ML: at 17:32

## 2018-04-04 RX ADMIN — ACETAMINOPHEN 500 MG: 500 TABLET ORAL at 17:29

## 2018-04-04 RX ADMIN — OXYCODONE HYDROCHLORIDE 5 MG: 5 TABLET ORAL at 15:20

## 2018-04-04 RX ADMIN — DULOXETINE HYDROCHLORIDE 60 MG: 60 CAPSULE, DELAYED RELEASE ORAL at 08:22

## 2018-04-04 RX ADMIN — PANTOPRAZOLE SODIUM 40 MG: 40 TABLET, DELAYED RELEASE ORAL at 08:22

## 2018-04-04 RX ADMIN — MAGNESIUM HYDROXIDE 30 ML: 400 SUSPENSION ORAL at 08:22

## 2018-04-04 RX ADMIN — HYDROMORPHONE HYDROCHLORIDE 8 MG: 2 TABLET ORAL at 02:34

## 2018-04-04 RX ADMIN — DULOXETINE HYDROCHLORIDE 60 MG: 60 CAPSULE, DELAYED RELEASE ORAL at 17:29

## 2018-04-04 RX ADMIN — IBUPROFEN 600 MG: 200 TABLET, FILM COATED ORAL at 01:34

## 2018-04-04 RX ADMIN — OXYCODONE HYDROCHLORIDE 30 MG: 5 TABLET ORAL at 23:23

## 2018-04-04 RX ADMIN — ACETAMINOPHEN 500 MG: 500 TABLET ORAL at 01:34

## 2018-04-04 RX ADMIN — OXYCODONE HYDROCHLORIDE 25 MG: 5 TABLET ORAL at 15:14

## 2018-04-04 RX ADMIN — GABAPENTIN 400 MG: 400 CAPSULE ORAL at 15:14

## 2018-04-04 RX ADMIN — HYDROMORPHONE HYDROCHLORIDE 8 MG: 2 TABLET ORAL at 21:04

## 2018-04-04 RX ADMIN — CYCLOBENZAPRINE HYDROCHLORIDE 10 MG: 10 TABLET, FILM COATED ORAL at 22:39

## 2018-04-04 RX ADMIN — ACETAMINOPHEN 500 MG: 500 TABLET ORAL at 07:18

## 2018-04-04 RX ADMIN — CYCLOBENZAPRINE HYDROCHLORIDE 10 MG: 10 TABLET, FILM COATED ORAL at 14:04

## 2018-04-04 RX ADMIN — Medication 10 ML: at 15:28

## 2018-04-04 RX ADMIN — OXYCODONE HYDROCHLORIDE 30 MG: 5 TABLET ORAL at 19:11

## 2018-04-04 NOTE — PROGRESS NOTES
0710 - TRANSFER - IN REPORT:    Verbal report received from [facility] RN(name) on Mariam Cruz  being received from ICU(unit) for routine progression of care      Report consisted of patients Situation, Background, Assessment and   Recommendations(SBAR). Information from the following report(s) SBAR, Kardex, Intake/Output and MAR was reviewed with the receiving nurse. Opportunity for questions and clarification was provided. 0621 - pt on floor from ICU. Admission assessment performed. NAD noted, will cont to monitor. 0830 - ambulated pt to bathroom. Pt had unsteady gait. Recommended use of walker for pt safety. Pt verbalized understanding. 1135 - pt in bed sleeping during hourly rounds. Easily aroused. NAD noted, will cont to monitor. 1202 - went in during rounds to get pt out of bed for lunch. Pt was sleeping & snoring. Attempted to arouse the pt with light touch and calling by name. Pt was not arousable. Sternal rubbed the pt w/no success. Sheela Bautista RN into the room for assistance. Sternal rubbed the pt for a second time for approximately 1 minute before pt awoke. Pt was then alert & responsive. Ambulated pt to bathroom, & left pt in room w/ physical therapy. 1230 - administered scheduled ibuprofen & tylenol. Notified charge nurse of pt c/o persistent pain & wanting narcotics. Advised pt of the concerns of effects of narcotics d/t sternal rub w/out speaking to MD. Pt verbalized understanding. [phone] - charge nurse administered prn dilaudid 8mg to pt.     1300 - charge nurse advised nurse manager of pain control concerns for the pt.     5299 - advised pt that per charge nurse & nurse manager, she may have the flexeril at 1400. Pt in notable pain. Will cont to monitor. 1400 - administered prn meds for muscle spasms. 8050 - spoke w/Dr. Richard Mensah regarding transfer of pt to ICU. Telephone order to transfer pt, continuous cardiac monitoring, & palliative consult ordered.      [facility],Third Floor - prn meds given for pain rated 9/10. TELE monitoring applied, NSR w/O2 95%. Will cont to monitor. 1611 - pain reassessment performed. Pt rated pain 7-8/10. Sister no longer at bedside.  is at bedside now. Will cont to monitor. 1730 - during rounds pt states that she feels \"loopy\". She is responding appropriately but is slurring her speech. 1810 - pt stated that she would like her prn medication roxicodone as soon as it is available to be administered since it is the only thing that works for her. 1924 - Bedside and Verbal shift change report given to Krystyna Dickey RN (oncoming nurse) by Tia Allen RN (offgoing nurse). Report included the following information SBAR, Kardex, Intake/Output and MAR.

## 2018-04-04 NOTE — PROGRESS NOTES
Problem: Mobility Impaired (Adult and Pediatric)  Goal: *Acute Goals and Plan of Care (Insert Text)  Physical Therapy Goals  Initiated 4/3/2018 and to be accomplished within 7 day(s)  1. Patient will move from supine to sit and sit to supine  in bed with modified independence. 2.  Patient will maintain seated at edge of bed for 10 min with modified independence to prepare for out of bed activity. 3.  Patient will perform sit to stand with modified independence. 4.  Patient will transfer from bed to chair and chair to bed with modified independence using the least restrictive device. 5.  Patient will ambulate with modified independence for 150 feet with the least restrictive device. 6.  Patient will ascend/descend 12 stairs with handrail(s) with modified independence. 7. Patient will verbalize 3/3 lumbar precautions. 8. Patient will demonstrate compliance with lumbar precautions greater than 90% of session. 9. Patient will demonstrate appropriate use of incentive spirometer to aid in pulomnary compliance for mobility. Outcome: Progressing Towards Goal  physical Therapy TREATMENT    Patient: Anusha Hayes (35 y.o. female)  Date: 4/4/2018  Diagnosis: thoracic lumbar radiculopathy   m54.16  m54.14  Radiculopathy, thoracic region  Lumbar radiculopathy  Acquired flat back syndrome Lumbar radiculopathy  Procedure(s) (LRB):  exposure of previous t10 - l3 fusion/ removal of hardware l3 pedicle subtraction osteotomy instrumentation ilium tto t10, bilateral duralrepair (N/A)  instrumentation ilium to possible t4 (N/A) 6 Days Post-Op  Precautions: Fall, Back, Spinal  Chart, physical therapy assessment, plan of care and goals were reviewed. PLOF:     ASSESSMENT:  Pt presents up in bathroom ad adrian with RN present. Pt exiting bathroom pushing IV pole and walker. Dependent for donning TLSO. Anxious. Increasing distance w gait. EDUCATION: Lumbar precautions.  Cues for hand placement, walker mgt. & positioning during stand to sit. Progression toward goals:        Improving slowly and progressing toward goals     PLAN:  Patient continues to benefit from skilled intervention to address the above impairments. Continue treatment per established plan of care. Discharge Recommendations: Home w [place]  Further Equipment Recommendations for Discharge:  has Rw      SUBJECTIVE:   Patient stated I need my pain medicine.     OBJECTIVE DATA SUMMARY:   Critical Behavior:  Neurologic State: Alert  Orientation Level: Oriented X4  Cognition: Follows commands  Safety/Judgement: Fall prevention    G CODE:Mobility [id number] Current  CL= 60-79%   Goal  CI= 1-19%. The severity rating is based on the [facility] Standing Balance Scale  2: Pt supports self independently with both upper extremities (walker, crutches, parallel bars). CL, 60% to <80% impaired. Functional Mobility Training:  Bed Mobility:  Sit to Supine: Contact guard assistance  Transfers:  Sit to Stand: Contact guard assistance  Stand to Sit: Modified independent        Bed to Chair: Contact guard assistance (w/RW)  Balance:  Sitting: Impaired  Sitting - Static: Good (unsupported)  Sitting - Dynamic: Fair (occasional)  Standing: Impaired; With support  Standing - Static: Fair  Standing - Dynamic : Fair  Ambulation/Gait Training:  Distance (ft): 60 Feet (ft)  Assistive Device: Brace/Splint;Gait belt;Walker, rolling  Ambulation - Level of Assistance: Supervision;Contact guard assistance  Gait Abnormalities: Antalgic; Altered arm swing;Decreased step clearance;Trendelenburg  Speed/Leonie: Slow  Step Length: Left shortened;Right shortened  Interventions: Safety awareness training;Verbal cues  Vital Signs  Temp: 98.9 °F (37.2 °C)     Pulse (Heart Rate): 92     BP: 132/76     Resp Rate: 18     O2 Sat (%): 94 %  Pain:  Pre treatment pain level:  6  Post treatment pain level:  6  Pain Scale 1: Visual  Pain Location 1: Back;Leg  Pain Description 1: Aching  Pain Intervention(s) 1: Repositioned  Activity Tolerance:   poor  After treatment:   Patient left in no apparent distress sitting up in chair  Call bell left within reach  Nursing Harsha notified pt request for pain medication  Sister present  Ronald Dubois PTA   Time Calculation: 20 mins

## 2018-04-04 NOTE — ROUTINE PROCESS
TRANSFER - OUT REPORT:    Verbal report given to SARAH Mcleod(name) on Christi Goel  being transferred to 2222(unit) for routine progression of care       Report consisted of patients Situation, Background, Assessment and   Recommendations(SBAR). Information from the following report(s) SBAR, Kardex, Intake/Output, MAR and Cardiac Rhythm NSR was reviewed with the receiving nurse. Lines:   PICC Triple Lumen 30/98/75 Right;Basilic (Active)   Central Line Being Utilized Yes 4/4/2018  4:00 AM   Criteria for Appropriate Use Hemodynamically unstable, requiring monitoring lines, vasopressors, or volume resuscitation 4/4/2018  4:00 AM   Site Assessment Clean, dry, & intact 4/4/2018  6:00 AM   Phlebitis Assessment 0 4/4/2018  4:00 AM   Infiltration Assessment 0 4/4/2018  4:00 AM   Arm Circumference (cm) 24 cm 3/31/2018  5:00 PM   Date of Last Dressing Change 03/31/18 4/4/2018  4:00 AM   Dressing Status Clean, dry, & intact 4/4/2018  5:00 AM   External Catheter Length (cm) 0 centimeters 4/3/2018  4:00 PM   Dressing Type Disk with Chlorhexadine gluconate (CHG) 4/4/2018  4:00 AM   Action Taken Open ports on tubing capped 4/4/2018  4:00 AM   Hub Color/Line Status Gray;Capped;Flushed 4/4/2018  4:00 AM   Positive Blood Return (Site #1) Yes 4/4/2018  4:00 AM   Hub Color/Line Status Red; Infusing 4/4/2018  4:00 AM   Positive Blood Return (Site #2) Yes 4/4/2018  4:00 AM   Hub Color/Line Status White;Capped;Flushed 4/4/2018  4:00 AM   Positive Blood Return (Site #3) Yes 4/4/2018  4:00 AM   Alcohol Cap Used Yes 4/4/2018  4:00 AM       Peripheral IV 03/29/18 Left Arm (Active)   Site Assessment Clean, dry, & intact 4/4/2018  6:00 AM   Phlebitis Assessment 0 4/4/2018  6:00 AM   Infiltration Assessment 0 4/4/2018  6:00 AM   Dressing Status Clean, dry, & intact 4/4/2018  4:00 AM   Dressing Type Tape;Transparent 4/4/2018  4:00 AM   Hub Color/Line Status Pink;Capped 4/4/2018  4:00 AM   Action Taken Open ports on tubing capped 4/4/2018 4:00 AM   Alcohol Cap Used Yes 4/4/2018  4:00 AM        Opportunity for questions and clarification was provided.       Patient transported with:   Monitor  Registered Nurse

## 2018-04-04 NOTE — PROGRESS NOTES
Progress Note      Patient: Margie Page               Sex: female          DOA: 3/29/2018       YOB: 1960      Age:  60 y.o.        LOS:  LOS: 6 days               Subjective:   Pt is now being followed by pt and ot. the pt still is having pain requiring significant pain medications . She will work with her pain md to try to decrease her need for analgesics . pt has moved her bowels       Objective:      Visit Vitals    /75 (BP 1 Location: Right arm, BP Patient Position: Supine)    Pulse 98    Temp 98.2 °F (36.8 °C)    Resp 18    Ht 5' 6\" (1.676 m)    Wt 58.4 kg (128 lb 12.8 oz)    SpO2 94%    BMI 20.79 kg/m2       Physical Exam:  Pt is awake and is alert   Heart  Reg rate and rhythm '  Lungs good breath sounds heard   Abdomens oft and nontender   Neuro  Pt can move both legs       Lab/Data Reviewed:  BMP:   Lab Results   Component Value Date/Time     04/04/2018 04:50 AM    K 4.1 04/04/2018 04:50 AM     04/04/2018 04:50 AM    CO2 29 04/04/2018 04:50 AM    AGAP 6 04/04/2018 04:50 AM     (H) 04/04/2018 04:50 AM    BUN 11 04/04/2018 04:50 AM    CREA 0.47 (L) 04/04/2018 04:50 AM    GFRAA >60 04/04/2018 04:50 AM    GFRNA >60 04/04/2018 04:50 AM     CMP:   Lab Results   Component Value Date/Time     04/04/2018 04:50 AM    K 4.1 04/04/2018 04:50 AM     04/04/2018 04:50 AM    CO2 29 04/04/2018 04:50 AM    AGAP 6 04/04/2018 04:50 AM     (H) 04/04/2018 04:50 AM    BUN 11 04/04/2018 04:50 AM    CREA 0.47 (L) 04/04/2018 04:50 AM    GFRAA >60 04/04/2018 04:50 AM    GFRNA >60 04/04/2018 04:50 AM    CA 8.0 (L) 04/04/2018 04:50 AM    MG 2.3 04/04/2018 04:50 AM    PHOS 4.0 04/04/2018 04:50 AM    ALB 2.2 (L) 04/04/2018 04:50 AM    TP 4.2 (L) 04/04/2018 04:50 AM    GLOB 2.0 04/04/2018 04:50 AM    AGRAT 1.1 04/04/2018 04:50 AM    SGOT 16 04/04/2018 04:50 AM    ALT 18 04/04/2018 04:50 AM     CBC:   Lab Results   Component Value Date/Time    WBC 6.3 04/04/2018 04:50 AM    HGB 7.4 (L) 04/04/2018 04:50 AM    HCT 23.6 (L) 04/04/2018 04:50 AM     04/04/2018 04:50 AM           Assessment/Plan     Principal Problem:    Lumbar radiculopathy (3/29/2018)    Active Problems:    Radiculopathy, thoracic region (3/29/2018)      Acquired flat back syndrome (3/29/2018)        Plan: will follow closely . Doing well post op .

## 2018-04-04 NOTE — PROGRESS NOTES
1910> Assumed care from Alyce Garcia RN. Patient is awake and alert, c/o lower back pain, tylenol and motrin given as scheduled. Assisted to bedside commode.  Tolerated well  0000> No cahnges noted  0600> Removed aguilar as ordered

## 2018-04-04 NOTE — PROGRESS NOTES
This Nurse Manager spoke with Dr. Jere Domingo who expressed that patient cannot be weaned from the pain medicine at this time as it may cause an adverse fatal reaction as well. He stated that PRN medicines may give some room to adjust but taking it away completely may cause withdrawals. These orders were reflective of the current pain management regimen that Ms. Andressa Mccartney is being treated with on an outpatient basis. Dr. Jere Domingo is also aware of the concern for potential respiratory depression as evidenced by primary RN having to to sternal rub patient to arouse. Ms. Andressa Mccartney has stated to the Charge RN that Dilaudid does not work for her. Confirmed the verbal orders given to primary RN for continuous cardiac and pulse oximetry monitoring, palliative consult to review and advise until there is the ability to transfer back to ICU for extensive pain management. The clinical team will continue to monitor.

## 2018-04-04 NOTE — PROGRESS NOTES
Problem: Self Care Deficits Care Plan (Adult)  Goal: *Acute Goals and Plan of Care (Insert Text)  Occupational Therapy Goals  Initiated 4/3/2018 within 7 day(s). 1.  Patient will perform grooming tasks while standing with modified independence. 2.  Patient will perform lower body dressing with modified independence utilizing adaptive equipment, prn.  3.  Patient will perform functional task in standing for 8 minutes with modified independence to increase activity tolerance for ADLs. 4.  Patient will perform toilet transfers with modified independence. 5.  Patient will perform all aspects of toileting with modified independence. 6.  Patient will participate in upper extremity therapeutic exercise/activities with modified independence for 8 minutes to increase BUE strength for ADLs. 7.  Patient will utilize energy conservation techniques during functional activities with minimal verbal cues. Outcome: Progressing Towards Goal  Occupational Therapy TREATMENT    Patient: Ross Hollins (60 y.o. female)  Date: 4/4/2018  Diagnosis: thoracic lumbar radiculopathy   m54.16  m54.14  Radiculopathy, thoracic region  Lumbar radiculopathy  Acquired flat back syndrome Lumbar radiculopathy  Procedure(s) (LRB):  exposure of previous t10 - l3 fusion/ removal of hardware l3 pedicle subtraction osteotomy instrumentation ilium tto t10, bilateral duralrepair (N/A)  instrumentation ilium to possible t4 (N/A) 6 Days Post-Op  Precautions: Fall, Back, Spinal  Chart, occupational therapy assessment, plan of care, and goals were reviewed. PLOF: Independent w/BADLs    ASSESSMENT:  Pt now out of ICU, on ortho/neuro unit. OOB seated in chair, tolerating ~ 45 minutes per pt. Pt c/o LBP 10/10 request return to supine. Verbal cues for pacing w/fucntional transfer to EOB w/RW. Pt requires Max Assist doffing TLSO. Pt demonstrates \"log roll\" technique maneuvering to supine and CGA to maintain lumbar precautions rolling  > R.  Pt tearful, left w/all needs within reach and supportive sister at bedside. EDUCATION Pt educated on importance of maintaining lumbar precautions when repositioning in bed. Progression toward goals:  []          Improving appropriately and progressing toward goals  [x]          Improving slowly and progressing toward goals  []          Not making progress toward goals and plan of care will be adjusted     PLAN:  Patient continues to benefit from skilled intervention to address the above impairments. Continue treatment per established plan of care. Discharge Recommendations:  [address] pending pain mgt  Further Equipment Recommendations for Discharge:  shower chair     SUBJECTIVE:   Patient stated I've got to get back in bed.     OBJECTIVE DATA SUMMARY:       Cognitive/Behavioral Status:  Neurologic State: Alert  Orientation Level: Oriented X4  Cognition: Follows commands  Safety/Judgement: Fall prevention  Functional Mobility and Transfers for ADLs:   Bed Mobility:  Sit to Supine: Contact guard assistance   Transfers:  Sit to Stand: Contact guard assistance  Bed to Chair: Contact guard assistance (w/RW)  Balance:  Sitting: Impaired  Sitting - Static: Good (unsupported)  Sitting - Dynamic: Fair (occasional)  Standing: Impaired; With support  Standing - Static: Fair  Standing - Dynamic : Fair  ADL Intervention:  Upper Body Dressing Assistance  Orthotics(Brace): Maximum assistance    Pain:  Pre Treatment:10  Post Treatment:10  Pain Scale 1: Visual  Pain Intensity 1: 10  Pain Location 1: Back;Leg  Pain Description 1: Aching  Pain Intervention(s) 1: Repositioned    Activity Tolerance:    Poor 2/2 c/o LBP     Please refer to the flowsheet for vital signs taken during this treatment.   After treatment:   []  Patient left in no apparent distress sitting up in chair  [x]  Patient left in no apparent distress in bed  [x]  Call bell left within reach  [x]  Nursing notified  [x]  sister present  []  Bed alarm activated    GUNJAN Baker  Time Calculation: 18 mins

## 2018-04-04 NOTE — PROGRESS NOTES
Patient stated during rounds her discharge plan is to go to [facility]. I went to speak to the patient and she was sleeping very soundly and did not awaken to name calling and light touch. I called and discussed discharge with her . He stated he thought the patient would return home with home care but agreed to matching to [facility]. I explained the patient will require authorization for admission to [facility] and they will have to agree to accept her. I have left a SNF list in the patients room. I enquired if there was another facility I could match her to in the event [facility] is unable to accept. He stated he did not know and would discuss it with his wife. I have matched the patient in [facility] to [facility] and called Carmen and informed her of the patient request for admission. I have asked Carmen if she would begin authorization if the patient is accepted and keep case management  posted in [facility] on her admission status.   Kathia Juárez RN

## 2018-04-04 NOTE — PROGRESS NOTES
Problem: Falls - Risk of  Goal: *Absence of Falls  Document Ivania Fall Risk and appropriate interventions in the flowsheet.    Outcome: Progressing Towards Goal  Fall Risk Interventions:  Mobility Interventions: Communicate number of staff needed for ambulation/transfer, Patient to call before getting OOB, Utilize walker, cane, or other assitive device         Medication Interventions: Patient to call before getting OOB, Teach patient to arise slowly    Elimination Interventions: Call light in reach, Patient to call for help with toileting needs, Toileting schedule/hourly rounds

## 2018-04-04 NOTE — PROGRESS NOTES
Neurosurgery Progress Note;   Post op day 6  Over last 24 hours has gotten off pressors, and off iv medications discussed pain control with dr. Jonnie Worrell her pain md  S: reports different points of pain  In back and legs eating and has had a bm   O; afebrile and vitals are stable.  Wound is clean and dry denies headache hct 23.6% motor function is 5.5  A; slowly better  P; to 2 central supportive treatment

## 2018-04-04 NOTE — ANCILLARY DISCHARGE INSTRUCTIONS
Patient and/or next of kin has been given the [facility] Important Message From Medicare About Your Rights\" letter and all questions were answered.

## 2018-04-04 NOTE — PROGRESS NOTES
Spoke with Dr. Starla Parkinson and updated on status. Per primary nurse patient was un arouseable had to perform sternal rubs lasting > 1minute to awaken. Primary and charge nurse with concerns of safety (respiratory and neuro status change). This nurse noted the need for high dose pain medication and frequency. Per MD stated patient needed to have pain medication as ordered for relief, did not wan't to decrease or discontinue any of the medications at this time. If surgical unit is unable to accommodate the need to have patient transferred back to ICU ( 2600 neuro per request). Nurse manager updated on status, will continue to monitor closely. Request initiated.

## 2018-04-05 LAB
ALBUMIN SERPL-MCNC: 2.3 G/DL (ref 3.4–5)
ALBUMIN/GLOB SERPL: 1.2 {RATIO} (ref 0.8–1.7)
ALP SERPL-CCNC: 84 U/L (ref 45–117)
ALT SERPL-CCNC: 18 U/L (ref 13–56)
ANION GAP SERPL CALC-SCNC: 5 MMOL/L (ref 3–18)
AST SERPL-CCNC: 16 U/L (ref 15–37)
BASOPHILS # BLD: 0 K/UL (ref 0–0.06)
BASOPHILS NFR BLD: 0 % (ref 0–2)
BILIRUB SERPL-MCNC: 0.2 MG/DL (ref 0.2–1)
BUN SERPL-MCNC: 11 MG/DL (ref 7–18)
BUN/CREAT SERPL: 23 (ref 12–20)
CALCIUM SERPL-MCNC: 8.3 MG/DL (ref 8.5–10.1)
CHLORIDE SERPL-SCNC: 108 MMOL/L (ref 100–108)
CO2 SERPL-SCNC: 31 MMOL/L (ref 21–32)
CREAT SERPL-MCNC: 0.47 MG/DL (ref 0.6–1.3)
DIFFERENTIAL METHOD BLD: ABNORMAL
EOSINOPHIL # BLD: 0.6 K/UL (ref 0–0.4)
EOSINOPHIL NFR BLD: 9 % (ref 0–5)
ERYTHROCYTE [DISTWIDTH] IN BLOOD BY AUTOMATED COUNT: 15.8 % (ref 11.6–14.5)
GLOBULIN SER CALC-MCNC: 2 G/DL (ref 2–4)
GLUCOSE SERPL-MCNC: 102 MG/DL (ref 74–99)
HCT VFR BLD AUTO: 23.4 % (ref 35–45)
HGB BLD-MCNC: 7.3 G/DL (ref 12–16)
LYMPHOCYTES # BLD: 1.6 K/UL (ref 0.9–3.6)
LYMPHOCYTES NFR BLD: 25 % (ref 21–52)
MAGNESIUM SERPL-MCNC: 2.4 MG/DL (ref 1.6–2.6)
MCH RBC QN AUTO: 28.6 PG (ref 24–34)
MCHC RBC AUTO-ENTMCNC: 31.2 G/DL (ref 31–37)
MCV RBC AUTO: 91.8 FL (ref 74–97)
MONOCYTES # BLD: 0.6 K/UL (ref 0.05–1.2)
MONOCYTES NFR BLD: 9 % (ref 3–10)
NEUTS SEG # BLD: 3.5 K/UL (ref 1.8–8)
NEUTS SEG NFR BLD: 57 % (ref 40–73)
PHOSPHATE SERPL-MCNC: 4.1 MG/DL (ref 2.5–4.9)
PLATELET # BLD AUTO: 224 K/UL (ref 135–420)
PMV BLD AUTO: 8.3 FL (ref 9.2–11.8)
POTASSIUM SERPL-SCNC: 4.3 MMOL/L (ref 3.5–5.5)
PROT SERPL-MCNC: 4.3 G/DL (ref 6.4–8.2)
RBC # BLD AUTO: 2.55 M/UL (ref 4.2–5.3)
SODIUM SERPL-SCNC: 144 MMOL/L (ref 136–145)
WBC # BLD AUTO: 6.2 K/UL (ref 4.6–13.2)

## 2018-04-05 PROCEDURE — 84100 ASSAY OF PHOSPHORUS: CPT | Performed by: INTERNAL MEDICINE

## 2018-04-05 PROCEDURE — 80053 COMPREHEN METABOLIC PANEL: CPT | Performed by: INTERNAL MEDICINE

## 2018-04-05 PROCEDURE — 77010033678 HC OXYGEN DAILY

## 2018-04-05 PROCEDURE — 74011250637 HC RX REV CODE- 250/637: Performed by: NEUROLOGICAL SURGERY

## 2018-04-05 PROCEDURE — 77030032490 HC SLV COMPR SCD KNE COVD -B

## 2018-04-05 PROCEDURE — 97530 THERAPEUTIC ACTIVITIES: CPT

## 2018-04-05 PROCEDURE — 97535 SELF CARE MNGMENT TRAINING: CPT

## 2018-04-05 PROCEDURE — 85025 COMPLETE CBC W/AUTO DIFF WBC: CPT | Performed by: INTERNAL MEDICINE

## 2018-04-05 PROCEDURE — 74011250636 HC RX REV CODE- 250/636: Performed by: NEUROLOGICAL SURGERY

## 2018-04-05 PROCEDURE — 74011250637 HC RX REV CODE- 250/637: Performed by: HOSPITALIST

## 2018-04-05 PROCEDURE — 83735 ASSAY OF MAGNESIUM: CPT | Performed by: INTERNAL MEDICINE

## 2018-04-05 PROCEDURE — 65610000006 HC RM INTENSIVE CARE

## 2018-04-05 RX ADMIN — Medication 10 ML: at 16:01

## 2018-04-05 RX ADMIN — PANTOPRAZOLE SODIUM 40 MG: 40 TABLET, DELAYED RELEASE ORAL at 08:19

## 2018-04-05 RX ADMIN — Medication 10 ML: at 08:20

## 2018-04-05 RX ADMIN — Medication 20 ML: at 17:11

## 2018-04-05 RX ADMIN — IBUPROFEN 600 MG: 200 TABLET, FILM COATED ORAL at 06:34

## 2018-04-05 RX ADMIN — KETOROLAC TROMETHAMINE 30 MG: 30 INJECTION, SOLUTION INTRAMUSCULAR at 13:16

## 2018-04-05 RX ADMIN — Medication 10 ML: at 22:51

## 2018-04-05 RX ADMIN — Medication 10 ML: at 22:50

## 2018-04-05 RX ADMIN — Medication 10 ML: at 17:11

## 2018-04-05 RX ADMIN — IBUPROFEN 600 MG: 200 TABLET, FILM COATED ORAL at 17:11

## 2018-04-05 RX ADMIN — OXYCODONE HYDROCHLORIDE 30 MG: 5 TABLET ORAL at 07:56

## 2018-04-05 RX ADMIN — IBUPROFEN 600 MG: 200 TABLET, FILM COATED ORAL at 23:27

## 2018-04-05 RX ADMIN — Medication 10 ML: at 23:28

## 2018-04-05 RX ADMIN — GABAPENTIN 400 MG: 400 CAPSULE ORAL at 22:48

## 2018-04-05 RX ADMIN — ACETAMINOPHEN 500 MG: 500 TABLET ORAL at 17:11

## 2018-04-05 RX ADMIN — IBUPROFEN 600 MG: 200 TABLET, FILM COATED ORAL at 11:58

## 2018-04-05 RX ADMIN — Medication 5 ML: at 22:00

## 2018-04-05 RX ADMIN — DULOXETINE HYDROCHLORIDE 60 MG: 60 CAPSULE, DELAYED RELEASE ORAL at 08:19

## 2018-04-05 RX ADMIN — OXYCODONE HYDROCHLORIDE 30 MG: 5 TABLET ORAL at 20:01

## 2018-04-05 RX ADMIN — OXYCODONE HYDROCHLORIDE 30 MG: 5 TABLET ORAL at 04:23

## 2018-04-05 RX ADMIN — IBUPROFEN 600 MG: 200 TABLET, FILM COATED ORAL at 00:10

## 2018-04-05 RX ADMIN — OXYCODONE HYDROCHLORIDE 30 MG: 5 TABLET ORAL at 11:58

## 2018-04-05 RX ADMIN — DULOXETINE HYDROCHLORIDE 60 MG: 60 CAPSULE, DELAYED RELEASE ORAL at 17:11

## 2018-04-05 RX ADMIN — GABAPENTIN 400 MG: 400 CAPSULE ORAL at 16:01

## 2018-04-05 RX ADMIN — GABAPENTIN 400 MG: 400 CAPSULE ORAL at 08:19

## 2018-04-05 RX ADMIN — ACETAMINOPHEN 500 MG: 500 TABLET ORAL at 06:34

## 2018-04-05 RX ADMIN — HYDROMORPHONE HYDROCHLORIDE 8 MG: 2 TABLET ORAL at 17:10

## 2018-04-05 RX ADMIN — ACETAMINOPHEN 500 MG: 500 TABLET ORAL at 00:10

## 2018-04-05 RX ADMIN — Medication 10 ML: at 08:19

## 2018-04-05 RX ADMIN — MAGNESIUM HYDROXIDE 30 ML: 400 SUSPENSION ORAL at 08:19

## 2018-04-05 RX ADMIN — ACETAMINOPHEN 500 MG: 500 TABLET ORAL at 23:27

## 2018-04-05 RX ADMIN — ACETAMINOPHEN 500 MG: 500 TABLET ORAL at 11:58

## 2018-04-05 RX ADMIN — OXYCODONE HYDROCHLORIDE 30 MG: 5 TABLET ORAL at 16:01

## 2018-04-05 NOTE — PROGRESS NOTES
Problem: Falls - Risk of  Goal: *Absence of Falls  Document Ivania Fall Risk and appropriate interventions in the flowsheet.    Outcome: Progressing Towards Goal  Fall Risk Interventions:  Mobility Interventions: Patient to call before getting OOB         Medication Interventions: Evaluate medications/consider consulting pharmacy    Elimination Interventions: Call light in reach

## 2018-04-05 NOTE — PROGRESS NOTES
Progress Note      Patient: Rylan Roca               Sex: female          DOA: 3/29/2018       YOB: 1960      Age:  60 y.o.        LOS:  LOS: 7 days               Subjective:   Pt 's pain has been difficult to control . she is back in the icu . overall she looks well  And appears to be comfortable       Objective:      Visit Vitals    /89    Pulse 96    Temp 98.8 °F (37.1 °C)    Resp 11    Ht 5' 6\" (1.676 m)    Wt 58.4 kg (128 lb 12.8 oz)    SpO2 99%    BMI 20.79 kg/m2       Physical Exam:  Pt is awake and is alert   Heart reg rate and rhythm   Lungs good breth sounds heard   Abdomen soft and nontender  Extremities will move both lower extremites but slowly   Neuro nonfocal      Lab/Data Reviewed:  BMP:   Lab Results   Component Value Date/Time     04/05/2018 04:00 AM    K 4.3 04/05/2018 04:00 AM     04/05/2018 04:00 AM    CO2 31 04/05/2018 04:00 AM    AGAP 5 04/05/2018 04:00 AM     (H) 04/05/2018 04:00 AM    BUN 11 04/05/2018 04:00 AM    CREA 0.47 (L) 04/05/2018 04:00 AM    GFRAA >60 04/05/2018 04:00 AM    GFRNA >60 04/05/2018 04:00 AM     CMP:   Lab Results   Component Value Date/Time     04/05/2018 04:00 AM    K 4.3 04/05/2018 04:00 AM     04/05/2018 04:00 AM    CO2 31 04/05/2018 04:00 AM    AGAP 5 04/05/2018 04:00 AM     (H) 04/05/2018 04:00 AM    BUN 11 04/05/2018 04:00 AM    CREA 0.47 (L) 04/05/2018 04:00 AM    GFRAA >60 04/05/2018 04:00 AM    GFRNA >60 04/05/2018 04:00 AM    CA 8.3 (L) 04/05/2018 04:00 AM    MG 2.4 04/05/2018 04:00 AM    PHOS 4.1 04/05/2018 04:00 AM    ALB 2.3 (L) 04/05/2018 04:00 AM    TP 4.3 (L) 04/05/2018 04:00 AM    GLOB 2.0 04/05/2018 04:00 AM    AGRAT 1.2 04/05/2018 04:00 AM    SGOT 16 04/05/2018 04:00 AM    ALT 18 04/05/2018 04:00 AM     CBC:   Lab Results   Component Value Date/Time    WBC 6.2 04/05/2018 04:00 AM    HGB 7.3 (L) 04/05/2018 04:00 AM    HCT 23.4 (L) 04/05/2018 04:00 AM     04/05/2018 04:00 AM Assessment/Plan     Principal Problem:    Lumbar radiculopathy (3/29/2018)    Active Problems:    Radiculopathy, thoracic region (3/29/2018)      Acquired flat back syndrome (3/29/2018)        Plan:continue supportive care with respect to her pain

## 2018-04-05 NOTE — PROGRESS NOTES
1944 Received patient from [address] [facility] [address]. Patient is alert and oriented x 4.  2235 Informed by RN supervisor that room in ICU had opened up and was able to receive this patient, she was very happy about this.      56 Rolled patient over to unit trying to be as stable as possible (IN order to not jar her back)

## 2018-04-05 NOTE — PROGRESS NOTES
1120: Pt refusing PT at this time, will follow up later. 1305:  2nd attempt pt refusing PT again due to sister just arrived to visit. Will follow up as schedule permits.

## 2018-04-05 NOTE — ROUTINE PROCESS
0700 - Bedside shift change report given to Elba Orlando 108 (oncoming nurse) by Anais Deutsch (offgoing nurse). Report included the following information SBAR, MAR and Cardiac Rhythm NSR.     1900 - Bedside shift change report given to Susan Dhillon RN (oncoming nurse) by Jose Blank RN (offgoing nurse). Report included the following information SBAR, MAR and Cardiac Rhythm NSR.

## 2018-04-05 NOTE — PROGRESS NOTES
04/04/2018 2315 Assumed care of pt after bedside report from [address] [place] [address], Dorys Crawford. Connected to monitor, NBP cuff, and pulse ox. AAO X4. Roque X 4 with pt c/o numbess to rt leg. This is not new and Dr. Gemma Hoskins aware. Monitor denotes NSR. Lung clear . Pt on RA. Abd soft and nontender. Pt up to [facility] with assist and voiding without difficulty. Pulses present and palpable. Back dressing D&I.  2323Roxicodone 30 mg po given for c/o back, incisional pain. 2353 Resting after earlier pain meds. Pt alert and easily arouseable. Pain improved per earlier meds. 04/05/2018 0200 Resting without comlaints. No change in neuro assessment. Alex light within reach. [id number] Roxicodone 30 mg po given for pain. 0353 Pain relieved per meds. 0400 AM labs drawn and sent to lab. Up with assist and voiding in [facility] without difficulty. 0600 Pt up to [facility] Neuro status unchanged.

## 2018-04-05 NOTE — PROGRESS NOTES
Patient has been accepted to [facility]. for rehab, she will require authorization. SONU left for physician.   Nilay Jain RN

## 2018-04-05 NOTE — PROGRESS NOTES
Problem: Falls - Risk of  Goal: *Absence of Falls  Document Ivania Fall Risk and appropriate interventions in the flowsheet.    Outcome: Progressing Towards Goal  Fall Risk Interventions:  Mobility Interventions: Patient to call before getting OOB         Medication Interventions: Evaluate medications/consider consulting pharmacy, Patient to call before getting OOB    Elimination Interventions: Call light in reach, Patient to call for help with toileting needs, Toilet paper/wipes in reach

## 2018-04-05 NOTE — PROGRESS NOTES
conducted a Follow up consultation and Spiritual Assessment for Nannette Vasquez, who is a 60 y.o.,female. The  provided the following Interventions:  Continued the relationship of care and support. Listened empathically. Offered prayer and assurance of continued prayer on patients behalf. Chart reviewed. The following outcomes were achieved:  Patient expressed gratitude for 's visit. Assessment:  There are no spiritual or Uatsdin issues which require intervention at this time. Plan:  Chaplains will continue to follow and will provide pastoral care on an as needed/requested basis.  recommends bedside caregivers page  on duty if patient shows signs of acute spiritual or emotional distress. Meenakshi Garduno M.Div.   [facility] [address]  [phone]

## 2018-04-05 NOTE — PROGRESS NOTES
Problem: Self Care Deficits Care Plan (Adult)  Goal: *Acute Goals and Plan of Care (Insert Text)  Occupational Therapy Goals  Initiated 4/3/2018 within 7 day(s). 1.  Patient will perform grooming tasks while standing with modified independence. 2.  Patient will perform lower body dressing with modified independence utilizing adaptive equipment, prn.  3.  Patient will perform functional task in standing for 8 minutes with modified independence to increase activity tolerance for ADLs. 4.  Patient will perform toilet transfers with modified independence. 5.  Patient will perform all aspects of toileting with modified independence. 6.  Patient will participate in upper extremity therapeutic exercise/activities with modified independence for 8 minutes to increase BUE strength for ADLs. 7.  Patient will utilize energy conservation techniques during functional activities with minimal verbal cues. Outcome: Progressing Towards Goal  Occupational Therapy TREATMENT    Patient: Magrie Page (3 y.o. female)  Date: 4/5/2018  Diagnosis: thoracic lumbar radiculopathy   m54.16  m54.14  Radiculopathy, thoracic region  Lumbar radiculopathy  Acquired flat back syndrome Lumbar radiculopathy  Procedure(s) (LRB):  exposure of previous t10 - l3 fusion/ removal of hardware l3 pedicle subtraction osteotomy instrumentation ilium tto t10, bilateral duralrepair (N/A)  instrumentation ilium to possible t4 (N/A) 7 Days Post-Op  Precautions: Fall, Back, Spinal  Chart, occupational therapy assessment, plan of care, and goals were reviewed. PLOF: Independent    ASSESSMENT:  Pt returned to ICU for pain mgt. Pt agreeable to OOB. Demonstrates \"log roll\" technique maneuvering to EOB, requesting to use bathroom. Verbal cues for hand placement w/stand pivot transfer to [facility]. Decrease dynamic standing balance requires CGA w/clothing mgt. Pt tolerates standing sinkside performing ADL grooming tasks w/increase time and CGA.  /77 s/p activity. EDUCATION Pt educated on importance OOB and encouraged OOB for all meals  Progression toward goals:  []          Improving appropriately and progressing toward goals  []          Improving slowly and progressing toward goals  []          Not making progress toward goals and plan of care will be adjusted     PLAN:  Patient continues to benefit from skilled intervention to address the above impairments. Continue treatment per established plan of care. Discharge Recommendations:  Anibal Tsanguel  Further Equipment Recommendations for Discharge:  N/A, pt reports she has DME     SUBJECTIVE:   Patient stated Can't I just stand at the sink to brush my teeth? Sarah Denise    OBJECTIVE DATA SUMMARY:       Cognitive/Behavioral Status:  Neurologic State: Alert  Orientation Level: Oriented X4  Cognition: Follows commands  Safety/Judgement: Fall prevention  Functional Mobility and Transfers for ADLs:   Bed Mobility:  Supine to Sit: Stand-by assistance   Transfers:  Sit to Stand: Stand-by assistance (w/RW)  Bed to Chair: Contact guard assistance (w/RW)   Toilet Transfer : Contact guard assistance (EOB --> BSC xfer w/SPT)   Balance:  Sitting: Impaired  Sitting - Static: Good (unsupported)  Sitting - Dynamic: Fair (occasional)  Standing: Impaired; With support  Standing - Static: Fair  Standing - Dynamic : Fair  ADL Intervention:  Grooming  Washing Face: Supervision/set-up  Washing Hands: Supervision/set-up  Brushing Teeth: Supervision/set-up  Brushing/Combing Hair: Supervision/set-up (seated EOB)    Upper Body Dressing Assistance  Orthotics(Brace): Maximum assistance    Toileting  Toileting Assistance: Contact guard assistance  Bladder Hygiene: Supervision/set-up (seated)  Clothing Management: Contact guard assistance    Pain:  Pre Treatment:6  Post Treatment:8  Pain Scale 1: Numeric (0 - 10)  Pain Intensity 1: 8  Pain Location 1: Back  Pain Orientation 1: Posterior  Pain Description 1: Aching  Pain Intervention(s) 1: Repositioned;Nurse notified    Activity Tolerance:    Fair    Please refer to the flowsheet for vital signs taken during this treatment.   After treatment:   [x]  Patient left in no apparent distress sitting up in chair  []  Patient left in no apparent distress in bed  [x]  Call bell left within reach  [x]  Nursing notified  [x]  Sister, Ilsa Richmond, present  []  Bed alarm activated    GUNJAN Baker  Time Calculation: 26 mins

## 2018-04-05 NOTE — PROGRESS NOTES
Progress Note      Patient: Ross Hollins               Sex: female          DOA: 3/29/2018         YOB: 1960      Age:  60 y.o.        LOS:  LOS: 7 days                  Procedure(s):  exposure of previous t10 - l3 fusion/ removal of hardware l3 pedicle subtraction osteotomy instrumentation ilium tto t10, bilateral duralrepair  instrumentation ilium to possible t4  1. Removal of T11 to L2 pedicle screw instrumentation. 2.  T10 to S1 pedicle screw instrumentation. 3.  Iliac bolt instrumentation. 4.  Exploration of prior fusion. 5.  L3 pedicle subtraction osteotomy. 6.  Repair of durotomy. 7.  L2 to L4 posterolateral fusion. 8.  Fluoroscopy. SURGERY DATE: 3/29/2018               Dx:  thoracic lumbar radiculopathy   m54.16  m54.14  Radiculopathy, thoracic region  Lumbar radiculopathy  Acquired flat back syndrome        Past Medical History:   Diagnosis Date    Acute sinusitis     Adverse effect of anesthesia     chronic pain patient with difficulty controlling pain postoperatively    Arrhythmia     palpatation    Chronic pain     low back    DDD (degenerative disc disease)     Heart palpitations     Neuropathy     Osteoarthritis of left hip 2/4/2016    Osteoarthritis of right hip 8/30/2015       Past Surgical History:   Procedure Laterality Date   [facility] SURGERY  1985,1987,2002,2005 ,,[id number]    x8    HX BUNIONECTOMY Left 2011    HX DILATION AND CURETTAGE      HX ORTHOPAEDIC  9-2015    right hip replacement    TOTAL HIP ARTHROPLASTY  2016    left hip       POD# 6  SUBJECTIVE:  Doing well. Moved back ICU for comfort with pain medication administration. Continues with pain to her back and legs. States she has some numbness to right leg.         OBJECTIVE:  Patient Vitals for the past 24 hrs:   Temp Pulse Resp BP SpO2   04/05/18 1100 - 88 19 110/85 94 %   04/05/18 1000 - 89 14 104/61 93 %   04/05/18 0900 - 83 14 113/73 92 %   04/05/18 0800 99.1 °F (37.3 °C) 86 12 112/66 97 %   04/05/18 0700 - 84 14 118/65 95 %   04/05/18 0600 - 87 13 112/70 94 %   04/05/18 0500 - 89 13 116/71 94 %   04/05/18 0400 97.7 °F (36.5 °C) 84 20 126/65 93 %   04/05/18 0300 - 96 20 98/44 91 %   04/05/18 0200 - 87 14 111/65 96 %   04/05/18 0100 - 89 14 105/59 93 %   04/05/18 0000 97.6 °F (36.4 °C) 89 15 115/86 92 %   04/04/18 2315 - (!) 106 21 125/63 93 %   04/04/18 2042 97.4 °F (36.3 °C) 90 18 108/50 94 %   04/04/18 1705 98.3 °F (36.8 °C) 97 18 108/73 95 %   04/04/18 1337 98.2 °F (36.8 °C) 98 18 129/75 94 %           Vent         Intake and Output    Intake/Output Summary (Last 24 hours) at 04/05/18 1118  Last data filed at 04/05/18 0600   Gross per 24 hour   Intake             2240 ml   Output             1200 ml   Net             1040 ml         Data    Recent Results (from the past 24 hour(s))   MAGNESIUM    Collection Time: 04/05/18  4:00 AM   Result Value Ref Range    Magnesium 2.4 1.6 - 2.6 mg/dL   PHOSPHORUS    Collection Time: 04/05/18  4:00 AM   Result Value Ref Range    Phosphorus 4.1 2.5 - 4.9 MG/DL   METABOLIC PANEL, COMPREHENSIVE    Collection Time: 04/05/18  4:00 AM   Result Value Ref Range    Sodium 144 136 - 145 mmol/L    Potassium 4.3 3.5 - 5.5 mmol/L    Chloride 108 100 - 108 mmol/L    CO2 31 21 - 32 mmol/L    Anion gap 5 3.0 - 18 mmol/L    Glucose 102 (H) 74 - 99 mg/dL    BUN 11 7.0 - 18 MG/DL    Creatinine 0.47 (L) 0.6 - 1.3 MG/DL    BUN/Creatinine ratio 23 (H) 12 - 20      GFR est AA >60 >60 ml/min/1.73m2    GFR est non-AA >60 >60 ml/min/1.73m2    Calcium 8.3 (L) 8.5 - 10.1 MG/DL    Bilirubin, total 0.2 0.2 - 1.0 MG/DL    ALT (SGPT) 18 13 - 56 U/L    AST (SGOT) 16 15 - 37 U/L    Alk.  phosphatase 84 45 - 117 U/L    Protein, total 4.3 (L) 6.4 - 8.2 g/dL    Albumin 2.3 (L) 3.4 - 5.0 g/dL    Globulin 2.0 2.0 - 4.0 g/dL    A-G Ratio 1.2 0.8 - 1.7     CBC WITH AUTOMATED DIFF    Collection Time: 04/05/18  4:00 AM   Result Value Ref Range    WBC 6.2 4.6 - 13.2 K/uL    RBC 2.55 (L) 4.20 - 5.30 M/uL    HGB 7.3 (L) 12.0 - 16.0 g/dL    HCT 23.4 (L) 35.0 - 45.0 %    MCV 91.8 74.0 - 97.0 FL    MCH 28.6 24.0 - 34.0 PG    MCHC 31.2 31.0 - 37.0 g/dL    RDW 15.8 (H) 11.6 - 14.5 %    PLATELET 533 630 - 577 K/uL    MPV 8.3 (L) 9.2 - 11.8 FL    NEUTROPHILS 57 40 - 73 %    LYMPHOCYTES 25 21 - 52 %    MONOCYTES 9 3 - 10 %    EOSINOPHILS 9 (H) 0 - 5 %    BASOPHILS 0 0 - 2 %    ABS. NEUTROPHILS 3.5 1.8 - 8.0 K/UL    ABS. LYMPHOCYTES 1.6 0.9 - 3.6 K/UL    ABS. MONOCYTES 0.6 0.05 - 1.2 K/UL    ABS. EOSINOPHILS 0.6 (H) 0.0 - 0.4 K/UL    ABS.  BASOPHILS 0.0 0.0 - 0.06 K/UL    DF AUTOMATED          Current Facility-Administered Medications   Medication Dose Route Frequency    HYDROmorphone (DILAUDID) tablet 8 mg  8 mg Oral Q8H PRN    ibuprofen (MOTRIN) tablet 600 mg  600 mg Oral Q6H    acetaminophen (TYLENOL) tablet 500 mg  500 mg Oral Q6H    oxyCODONE IR (ROXICODONE) tablet 30 mg  30 mg Oral Q4H PRN    pantoprazole (PROTONIX) tablet 40 mg  40 mg Oral DAILY    gabapentin (NEURONTIN) capsule 400 mg  400 mg Oral TID    0.9% sodium chloride infusion 250 mL  250 mL IntraVENous PRN    ketorolac (TORADOL) injection 30 mg  30 mg IntraVENous Q6H PRN    bacitracin 500 unit/gram packet 1 Packet  1 Packet Topical PRN    sodium chloride (NS) flush 10-30 mL  10-30 mL InterCATHeter PRN    sodium chloride (NS) flush 10 mL  10 mL InterCATHeter Q24H    sodium chloride (NS) flush 10 mL  10 mL InterCATHeter PRN    sodium chloride (NS) flush 10-40 mL  10-40 mL InterCATHeter Q8H    sodium chloride (NS) flush 20 mL  20 mL InterCATHeter Q24H    0.9% sodium chloride infusion  100 mL/hr IntraVENous CONTINUOUS    cyclobenzaprine (FLEXERIL) tablet 10 mg  10 mg Oral TID PRN    DULoxetine (CYMBALTA) capsule 60 mg  60 mg Oral BID    sodium chloride (NS) flush 5-10 mL  5-10 mL IntraVENous Q8H    sodium chloride (NS) flush 5-10 mL  5-10 mL IntraVENous PRN    acetaminophen (TYLENOL) tablet 650 mg  650 mg Oral Q4H PRN    naloxone (NARCAN) injection 0.4 mg  0.4 mg IntraVENous PRN    ondansetron (ZOFRAN) injection 4 mg  4 mg IntraVENous Q4H PRN    phenol throat spray (CHLORASEPTIC) 1 Spray  1 Spray Oral PRN    magnesium hydroxide (MILK OF MAGNESIA) 400 mg/5 mL oral suspension 30 mL  30 mL Oral DAILY    sodium chloride (NS) flush 5-10 mL  5-10 mL IntraVENous Q8H    sodium chloride (NS) flush 5-10 mL  5-10 mL IntraVENous PRN    0.9% sodium chloride infusion 250 mL  250 mL IntraVENous PRN    influenza vaccine 2017-18 (3 yrs+)(PF) (FLUZONE QUAD/FLUARIX QUAD) injection 0.5 mL  0.5 mL IntraMUSCular PRIOR TO DISCHARGE       Physical Exam  General:  Neurologic: Alert and oriented X 3  Sensory: normal  Eyes: conjunctivae/corneas clear. PERRL, EOM's intact. Motor: Iliopsoas 5/5, Quads 5/5, Anterior Tibial 5/5, Gastrocnemius Medial 5/5, Soleus 5/5, and Extensor Hallucis Longus 5/5  Rickie negative. Denies tenderness to deep palpitation to lateral and medial thighs and calves. Skin: Incision to lower back with prineo intact, edges well approximated, no erythema, no edema, no drainage, no ecchymosis, and no hematoma. Assessment/Plan    60y.o. year old female who is hospital day 7 for Lumbar radiculopathy.     1.)  Neurologic:  Doing well. Mobilize in brace. PT.      2.)  Cardiovascular:  BP stable. 3.)  Pulmonary:  Pulmonary toilet.     Assessment and plan made with Dr. Dorsie Primrose, NP  4/5/2018  11:18 AM

## 2018-04-06 LAB
ALBUMIN SERPL-MCNC: 2.5 G/DL (ref 3.4–5)
ALBUMIN/GLOB SERPL: 1.2 {RATIO} (ref 0.8–1.7)
ALP SERPL-CCNC: 92 U/L (ref 45–117)
ALT SERPL-CCNC: 20 U/L (ref 13–56)
ANION GAP SERPL CALC-SCNC: 4 MMOL/L (ref 3–18)
AST SERPL-CCNC: 19 U/L (ref 15–37)
BASOPHILS # BLD: 0 K/UL (ref 0–0.06)
BASOPHILS NFR BLD: 0 % (ref 0–2)
BILIRUB SERPL-MCNC: 0.2 MG/DL (ref 0.2–1)
BUN SERPL-MCNC: 12 MG/DL (ref 7–18)
BUN/CREAT SERPL: 23 (ref 12–20)
CA-I SERPL-SCNC: 1.24 MMOL/L (ref 1.12–1.32)
CALCIUM SERPL-MCNC: 8.7 MG/DL (ref 8.5–10.1)
CHLORIDE SERPL-SCNC: 104 MMOL/L (ref 100–108)
CO2 SERPL-SCNC: 35 MMOL/L (ref 21–32)
CREAT SERPL-MCNC: 0.53 MG/DL (ref 0.6–1.3)
DIFFERENTIAL METHOD BLD: ABNORMAL
EOSINOPHIL # BLD: 0.5 K/UL (ref 0–0.4)
EOSINOPHIL NFR BLD: 9 % (ref 0–5)
ERYTHROCYTE [DISTWIDTH] IN BLOOD BY AUTOMATED COUNT: 15.8 % (ref 11.6–14.5)
GLOBULIN SER CALC-MCNC: 2.1 G/DL (ref 2–4)
GLUCOSE SERPL-MCNC: 83 MG/DL (ref 74–99)
HCT VFR BLD AUTO: 23.4 % (ref 35–45)
HGB BLD-MCNC: 7.4 G/DL (ref 12–16)
LYMPHOCYTES # BLD: 1.3 K/UL (ref 0.9–3.6)
LYMPHOCYTES NFR BLD: 21 % (ref 21–52)
MAGNESIUM SERPL-MCNC: 2.4 MG/DL (ref 1.6–2.6)
MCH RBC QN AUTO: 28.8 PG (ref 24–34)
MCHC RBC AUTO-ENTMCNC: 31.6 G/DL (ref 31–37)
MCV RBC AUTO: 91.1 FL (ref 74–97)
MONOCYTES # BLD: 0.7 K/UL (ref 0.05–1.2)
MONOCYTES NFR BLD: 11 % (ref 3–10)
NEUTS SEG # BLD: 3.7 K/UL (ref 1.8–8)
NEUTS SEG NFR BLD: 59 % (ref 40–73)
PHOSPHATE SERPL-MCNC: 4.7 MG/DL (ref 2.5–4.9)
PLATELET # BLD AUTO: 255 K/UL (ref 135–420)
PMV BLD AUTO: 8.4 FL (ref 9.2–11.8)
POTASSIUM SERPL-SCNC: 4.3 MMOL/L (ref 3.5–5.5)
PROT SERPL-MCNC: 4.6 G/DL (ref 6.4–8.2)
RBC # BLD AUTO: 2.57 M/UL (ref 4.2–5.3)
SODIUM SERPL-SCNC: 143 MMOL/L (ref 136–145)
WBC # BLD AUTO: 6.3 K/UL (ref 4.6–13.2)

## 2018-04-06 PROCEDURE — 65610000006 HC RM INTENSIVE CARE

## 2018-04-06 PROCEDURE — 83735 ASSAY OF MAGNESIUM: CPT | Performed by: NEUROLOGICAL SURGERY

## 2018-04-06 PROCEDURE — 77010033678 HC OXYGEN DAILY

## 2018-04-06 PROCEDURE — 97530 THERAPEUTIC ACTIVITIES: CPT

## 2018-04-06 PROCEDURE — 74011250637 HC RX REV CODE- 250/637: Performed by: HOSPITALIST

## 2018-04-06 PROCEDURE — 74011250637 HC RX REV CODE- 250/637: Performed by: NEUROLOGICAL SURGERY

## 2018-04-06 PROCEDURE — 80053 COMPREHEN METABOLIC PANEL: CPT | Performed by: NEUROLOGICAL SURGERY

## 2018-04-06 PROCEDURE — 85025 COMPLETE CBC W/AUTO DIFF WBC: CPT | Performed by: NEUROLOGICAL SURGERY

## 2018-04-06 PROCEDURE — 74011250637 HC RX REV CODE- 250/637: Performed by: NURSE PRACTITIONER

## 2018-04-06 PROCEDURE — 82330 ASSAY OF CALCIUM: CPT | Performed by: NEUROLOGICAL SURGERY

## 2018-04-06 PROCEDURE — 84100 ASSAY OF PHOSPHORUS: CPT | Performed by: NEUROLOGICAL SURGERY

## 2018-04-06 RX ORDER — GABAPENTIN 300 MG/1
600 CAPSULE ORAL 3 TIMES DAILY
Status: DISCONTINUED | OUTPATIENT
Start: 2018-04-06 | End: 2018-04-09

## 2018-04-06 RX ORDER — GABAPENTIN 100 MG/1
200 CAPSULE ORAL
Status: COMPLETED | OUTPATIENT
Start: 2018-04-06 | End: 2018-04-06

## 2018-04-06 RX ADMIN — GABAPENTIN 600 MG: 300 CAPSULE ORAL at 21:49

## 2018-04-06 RX ADMIN — PANTOPRAZOLE SODIUM 40 MG: 40 TABLET, DELAYED RELEASE ORAL at 08:34

## 2018-04-06 RX ADMIN — OXYCODONE HYDROCHLORIDE 30 MG: 5 TABLET ORAL at 06:16

## 2018-04-06 RX ADMIN — Medication 10 ML: at 21:52

## 2018-04-06 RX ADMIN — DULOXETINE HYDROCHLORIDE 60 MG: 60 CAPSULE, DELAYED RELEASE ORAL at 08:34

## 2018-04-06 RX ADMIN — Medication 10 ML: at 16:15

## 2018-04-06 RX ADMIN — HYDROMORPHONE HYDROCHLORIDE 8 MG: 2 TABLET ORAL at 12:02

## 2018-04-06 RX ADMIN — MAGNESIUM HYDROXIDE 30 ML: 400 SUSPENSION ORAL at 08:37

## 2018-04-06 RX ADMIN — HYDROMORPHONE HYDROCHLORIDE 8 MG: 2 TABLET ORAL at 21:49

## 2018-04-06 RX ADMIN — CYCLOBENZAPRINE HYDROCHLORIDE 10 MG: 10 TABLET, FILM COATED ORAL at 08:34

## 2018-04-06 RX ADMIN — ACETAMINOPHEN 500 MG: 500 TABLET ORAL at 23:59

## 2018-04-06 RX ADMIN — OXYCODONE HYDROCHLORIDE 30 MG: 5 TABLET ORAL at 10:23

## 2018-04-06 RX ADMIN — OXYCODONE HYDROCHLORIDE 30 MG: 5 TABLET ORAL at 18:22

## 2018-04-06 RX ADMIN — IBUPROFEN 600 MG: 200 TABLET, FILM COATED ORAL at 12:02

## 2018-04-06 RX ADMIN — Medication 10 ML: at 06:19

## 2018-04-06 RX ADMIN — Medication 10 ML: at 18:26

## 2018-04-06 RX ADMIN — ACETAMINOPHEN 500 MG: 500 TABLET ORAL at 06:14

## 2018-04-06 RX ADMIN — Medication 10 ML: at 06:18

## 2018-04-06 RX ADMIN — GABAPENTIN 600 MG: 300 CAPSULE ORAL at 16:16

## 2018-04-06 RX ADMIN — ACETAMINOPHEN 500 MG: 500 TABLET ORAL at 12:03

## 2018-04-06 RX ADMIN — OXYCODONE HYDROCHLORIDE 30 MG: 5 TABLET ORAL at 01:00

## 2018-04-06 RX ADMIN — Medication 10 ML: at 21:51

## 2018-04-06 RX ADMIN — Medication 10 ML: at 16:14

## 2018-04-06 RX ADMIN — CYCLOBENZAPRINE HYDROCHLORIDE 10 MG: 10 TABLET, FILM COATED ORAL at 16:16

## 2018-04-06 RX ADMIN — Medication 20 ML: at 18:27

## 2018-04-06 RX ADMIN — IBUPROFEN 600 MG: 200 TABLET, FILM COATED ORAL at 06:14

## 2018-04-06 RX ADMIN — OXYCODONE HYDROCHLORIDE 30 MG: 5 TABLET ORAL at 14:00

## 2018-04-06 RX ADMIN — ACETAMINOPHEN 500 MG: 500 TABLET ORAL at 18:23

## 2018-04-06 RX ADMIN — GABAPENTIN 400 MG: 400 CAPSULE ORAL at 08:34

## 2018-04-06 RX ADMIN — HYDROMORPHONE HYDROCHLORIDE 8 MG: 2 TABLET ORAL at 02:17

## 2018-04-06 RX ADMIN — GABAPENTIN 200 MG: 100 CAPSULE ORAL at 10:23

## 2018-04-06 RX ADMIN — IBUPROFEN 600 MG: 200 TABLET, FILM COATED ORAL at 18:23

## 2018-04-06 RX ADMIN — DULOXETINE HYDROCHLORIDE 60 MG: 60 CAPSULE, DELAYED RELEASE ORAL at 18:23

## 2018-04-06 NOTE — PROGRESS NOTES
1930- assumed care of pt AOx4. Neuro check done with off going shift. Pt c/o pain in back 10/10 and numbness in RLE. Pt informed that she can have Roxicodone at 2000 and she stated that she can wait. 2000- assessment done, roxicodone given po for back pain 10/10.  2045- pt sleeping, appears comfortable. 2200- pt states pain is 6/10 which is her base line pain level. 2400- reassessment done. Tylenol and ibuprofen given as scheduled. 0100- roxicodone given po for pain 8/10  0217- pt stated she got only a little relief from the roxicodone and now her pain level is 10/10. Dilaudid 8 MG given po for break through pain. 0300- pt states relief, pain level 7/10.  0325- pt assisted up to [facility]. Ambulated with steady gait with out assistance. 0345- pt back to bed with out assistance. Pt had large BM and states she feels much better. 0400- reassessment done. 1590- Dr. Cheron Schwab here to see pt. C/o pain 7/10. Roxicodone given. 0700-pt sleeping. 0715- Bedside and Verbal shift change report given to Marbin Murdock RN (oncoming nurse) by Yobani Muñoz RN   (offgoing nurse). Report included the following information SBAR, Kardex, Intake/Output, MAR, Recent Results and Cardiac Rhythm NSR.

## 2018-04-06 NOTE — PROGRESS NOTES
Progress Note      Patient: Karon Melchor               Sex: female          DOA: 3/29/2018         YOB: 1960      Age:  60 y.o.        LOS:  LOS: 8 days                  Procedure(s):  exposure of previous t10 - l3 fusion/ removal of hardware l3 pedicle subtraction osteotomy instrumentation ilium tto t10, bilateral duralrepair  instrumentation ilium to possible t4  1. Removal of T11 to L2 pedicle screw instrumentation. 2.  T10 to S1 pedicle screw instrumentation. 3.  Iliac bolt instrumentation. 4.  Exploration of prior fusion. 5.  L3 pedicle subtraction osteotomy. 6.  Repair of durotomy. 7.  L2 to L4 posterolateral fusion. 8.  Fluoroscopy. SURGERY DATE: 3/29/2018               Dx:  thoracic lumbar radiculopathy   m54.16  m54.14  Radiculopathy, thoracic region  Lumbar radiculopathy  Acquired flat back syndrome        Past Medical History:   Diagnosis Date    Acute sinusitis     Adverse effect of anesthesia     chronic pain patient with difficulty controlling pain postoperatively    Arrhythmia     palpatation    Chronic pain     low back    DDD (degenerative disc disease)     Heart palpitations     Neuropathy     Osteoarthritis of left hip 2/4/2016    Osteoarthritis of right hip 8/30/2015       Past Surgical History:   Procedure Laterality Date   [facility] SURGERY  1985,1987,2002,2005 ,,[id number]    x8    HX BUNIONECTOMY Left 2011    HX DILATION AND CURETTAGE      HX ORTHOPAEDIC  9-2015    right hip replacement    TOTAL HIP ARTHROPLASTY  2016    left hip       POD# 7  SUBJECTIVE:  Doing well. Continues with pain to her back and legs. States she has some numbness to right leg. Pain management continuing to be her biggest concern.       OBJECTIVE:  Patient Vitals for the past 24 hrs:   Temp Pulse Resp BP SpO2   04/06/18 1000 - 82 18 124/63 93 %   04/06/18 0900 - 96 16 118/67 96 %   04/06/18 0800 98.8 °F (37.1 °C) 81 16 112/85 95 %   04/06/18 0700 - 83 14 114/69 93 % 04/06/18 0600 - 87 15 114/78 94 %   04/06/18 0500 - 87 16 130/60 96 %   04/06/18 0400 99 °F (37.2 °C) 85 14 113/73 92 %   04/06/18 0300 - 91 15 123/67 93 %   04/06/18 0200 - 83 18 108/62 94 %   04/06/18 0100 - 83 15 114/73 -   04/06/18 0001 98.6 °F (37 °C) 98 19 104/58 -   04/05/18 2300 - 88 20 108/70 -   04/05/18 2200 - 94 14 113/70 -   04/05/18 2100 - 89 18 114/71 95 %   04/05/18 2000 100 °F (37.8 °C) 90 14 112/78 94 %   04/05/18 1906 - 100 20 - 96 %   04/05/18 1904 - - - 96/66 -   04/05/18 1900 - 97 26 96/66 -   04/05/18 1800 - 96 11 108/89 99 %   04/05/18 1700 - 100 20 114/84 96 %   04/05/18 1600 98.8 °F (37.1 °C) 96 19 116/62 -   04/05/18 1500 - 85 14 126/64 96 %   04/05/18 1300 - (!) 102 17 94/65 97 %           Vent         Intake and Output    Intake/Output Summary (Last 24 hours) at 04/06/18 1211  Last data filed at 04/06/18 0400   Gross per 24 hour   Intake              600 ml   Output              500 ml   Net              100 ml         Data    Recent Results (from the past 24 hour(s))   MAGNESIUM    Collection Time: 04/06/18  3:15 AM   Result Value Ref Range    Magnesium 2.4 1.6 - 2.6 mg/dL   PHOSPHORUS    Collection Time: 04/06/18  3:15 AM   Result Value Ref Range    Phosphorus 4.7 2.5 - 4.9 MG/DL   METABOLIC PANEL, COMPREHENSIVE    Collection Time: 04/06/18  3:15 AM   Result Value Ref Range    Sodium 143 136 - 145 mmol/L    Potassium 4.3 3.5 - 5.5 mmol/L    Chloride 104 100 - 108 mmol/L    CO2 35 (H) 21 - 32 mmol/L    Anion gap 4 3.0 - 18 mmol/L    Glucose 83 74 - 99 mg/dL    BUN 12 7.0 - 18 MG/DL    Creatinine 0.53 (L) 0.6 - 1.3 MG/DL    BUN/Creatinine ratio 23 (H) 12 - 20      GFR est AA >60 >60 ml/min/1.73m2    GFR est non-AA >60 >60 ml/min/1.73m2    Calcium 8.7 8.5 - 10.1 MG/DL    Bilirubin, total 0.2 0.2 - 1.0 MG/DL    ALT (SGPT) 20 13 - 56 U/L    AST (SGOT) 19 15 - 37 U/L    Alk.  phosphatase 92 45 - 117 U/L    Protein, total 4.6 (L) 6.4 - 8.2 g/dL    Albumin 2.5 (L) 3.4 - 5.0 g/dL    Globulin 2.1 2.0 - 4.0 g/dL    A-G Ratio 1.2 0.8 - 1.7     CBC WITH AUTOMATED DIFF    Collection Time: 04/06/18  3:15 AM   Result Value Ref Range    WBC 6.3 4.6 - 13.2 K/uL    RBC 2.57 (L) 4.20 - 5.30 M/uL    HGB 7.4 (L) 12.0 - 16.0 g/dL    HCT 23.4 (L) 35.0 - 45.0 %    MCV 91.1 74.0 - 97.0 FL    MCH 28.8 24.0 - 34.0 PG    MCHC 31.6 31.0 - 37.0 g/dL    RDW 15.8 (H) 11.6 - 14.5 %    PLATELET 365 116 - 921 K/uL    MPV 8.4 (L) 9.2 - 11.8 FL    NEUTROPHILS 59 40 - 73 %    LYMPHOCYTES 21 21 - 52 %    MONOCYTES 11 (H) 3 - 10 %    EOSINOPHILS 9 (H) 0 - 5 %    BASOPHILS 0 0 - 2 %    ABS. NEUTROPHILS 3.7 1.8 - 8.0 K/UL    ABS. LYMPHOCYTES 1.3 0.9 - 3.6 K/UL    ABS. MONOCYTES 0.7 0.05 - 1.2 K/UL    ABS. EOSINOPHILS 0.5 (H) 0.0 - 0.4 K/UL    ABS.  BASOPHILS 0.0 0.0 - 0.06 K/UL    DF AUTOMATED     CALCIUM, IONIZED    Collection Time: 04/06/18  8:00 AM   Result Value Ref Range    Ionized Calcium 1.24 1.12 - 1.32 MMOL/L        Current Facility-Administered Medications   Medication Dose Route Frequency    gabapentin (NEURONTIN) capsule 600 mg  600 mg Oral TID    HYDROmorphone (DILAUDID) tablet 8 mg  8 mg Oral Q8H PRN    ibuprofen (MOTRIN) tablet 600 mg  600 mg Oral Q6H    acetaminophen (TYLENOL) tablet 500 mg  500 mg Oral Q6H    oxyCODONE IR (ROXICODONE) tablet 30 mg  30 mg Oral Q4H PRN    pantoprazole (PROTONIX) tablet 40 mg  40 mg Oral DAILY    0.9% sodium chloride infusion 250 mL  250 mL IntraVENous PRN    bacitracin 500 unit/gram packet 1 Packet  1 Packet Topical PRN    sodium chloride (NS) flush 10-30 mL  10-30 mL InterCATHeter PRN    sodium chloride (NS) flush 10 mL  10 mL InterCATHeter Q24H    sodium chloride (NS) flush 10 mL  10 mL InterCATHeter PRN    sodium chloride (NS) flush 10-40 mL  10-40 mL InterCATHeter Q8H    sodium chloride (NS) flush 20 mL  20 mL InterCATHeter Q24H    0.9% sodium chloride infusion  100 mL/hr IntraVENous CONTINUOUS    cyclobenzaprine (FLEXERIL) tablet 10 mg  10 mg Oral TID PRN    DULoxetine (CYMBALTA) capsule 60 mg  60 mg Oral BID    sodium chloride (NS) flush 5-10 mL  5-10 mL IntraVENous Q8H    sodium chloride (NS) flush 5-10 mL  5-10 mL IntraVENous PRN    acetaminophen (TYLENOL) tablet 650 mg  650 mg Oral Q4H PRN    naloxone (NARCAN) injection 0.4 mg  0.4 mg IntraVENous PRN    ondansetron (ZOFRAN) injection 4 mg  4 mg IntraVENous Q4H PRN    phenol throat spray (CHLORASEPTIC) 1 Spray  1 Spray Oral PRN    magnesium hydroxide (MILK OF MAGNESIA) 400 mg/5 mL oral suspension 30 mL  30 mL Oral DAILY    sodium chloride (NS) flush 5-10 mL  5-10 mL IntraVENous Q8H    sodium chloride (NS) flush 5-10 mL  5-10 mL IntraVENous PRN    0.9% sodium chloride infusion 250 mL  250 mL IntraVENous PRN    influenza vaccine 2017-18 (3 yrs+)(PF) (FLUZONE QUAD/FLUARIX QUAD) injection 0.5 mL  0.5 mL IntraMUSCular PRIOR TO DISCHARGE       Physical Exam  General:  Neurologic: Alert and oriented X 3  Sensory: normal  Eyes: conjunctivae/corneas clear. PERRL, EOM's intact. Motor: Iliopsoas 5/5, Quads 5/5, Anterior Tibial 5/5, Gastrocnemius Medial 5/5, Soleus 5/5, and Extensor Hallucis Longus 5/5  Rickie negative. Denies tenderness to deep palpitation to lateral and medial thighs and calves. Skin: Incision to lower back with prineo intact, edges well approximated, no erythema, no edema, no drainage, no ecchymosis, and no hematoma. Assessment/Plan    60y.o. year old female who is hospital day 8 for Lumbar radiculopathy.     1.)  Neurologic:  Doing well but still having significant complaints of back pain. Increasing Neurontin dosing to assist with pain management. OOB with meals. Mobilize in brace. PT. Discussed plan with patient and she is amenable. Continue monitoring in ICU.    2.)  Cardiovascular:  BP stable. 3.)  Pulmonary:  Pulmonary toilet.     Assessment and plan made with Dr. Gay Baer, NP  4/6/2018  11:18 AM

## 2018-04-06 NOTE — PROGRESS NOTES
1900 -- Bedside, Verbal and Written shift change report given to [id number] Jose Cummings (oncoming nurse) by Maicoin nurse). Report included the following information SBAR, Kardex, Intake/Output, MAR and Recent Results.       2145 -- PM and PRN pain medications administered, pt tolerated with ease, will continue to monitor.     0000 -- Shift reassessment, pt condition unchanged, will continue to monitor. 0150 -- PRN pain medications administered, pt tolerated with ease, will continue to monitor.      0400 --  Shift reassessment, pt condition unchanged, will continue to monitor.        0700  -- Bedside, Verbal and Written shift change report given to Giuliana 8) by BUDDY (offgoing nurse). Report included the following information SBAR, Kardex, Intake/Output, MAR and Recent Results. Skin assessment completed.

## 2018-04-06 NOTE — PROGRESS NOTES
Progress Note      Patient: Noemi Gordon               Sex: female          DOA: 3/29/2018       YOB: 1960      Age:  60 y.o.        LOS:  LOS: 8 days               Subjective:   Pt continues to have pain but it is slowly coming under control . thept has chronic pain and she does not tolerate surgery very well .however this is her 9th surgery. pt is afebrile . She is c/o numbness in the lateral thigh in the right leg .  Pain is felt in her back with radiation down both legs     Objective:      Visit Vitals    /80    Pulse 98    Temp 98.7 °F (37.1 °C)    Resp 15    Ht 5' 6\" (1.676 m)    Wt 58.4 kg (128 lb 12.8 oz)    SpO2 96%    BMI 20.79 kg/m2       Physical Exam:  Pt is awake and says her pain is improved   Heart reg rate and rhythm   Lungs fair breath sounds heard   Abdomen soft and nontender   Neuro will move her legs but has some pain in doing so         Lab/Data Reviewed:  BMP:   Lab Results   Component Value Date/Time     04/06/2018 03:15 AM    K 4.3 04/06/2018 03:15 AM     04/06/2018 03:15 AM    CO2 35 (H) 04/06/2018 03:15 AM    AGAP 4 04/06/2018 03:15 AM    GLU 83 04/06/2018 03:15 AM    BUN 12 04/06/2018 03:15 AM    CREA 0.53 (L) 04/06/2018 03:15 AM    GFRAA >60 04/06/2018 03:15 AM    GFRNA >60 04/06/2018 03:15 AM     CMP:   Lab Results   Component Value Date/Time     04/06/2018 03:15 AM    K 4.3 04/06/2018 03:15 AM     04/06/2018 03:15 AM    CO2 35 (H) 04/06/2018 03:15 AM    AGAP 4 04/06/2018 03:15 AM    GLU 83 04/06/2018 03:15 AM    BUN 12 04/06/2018 03:15 AM    CREA 0.53 (L) 04/06/2018 03:15 AM    GFRAA >60 04/06/2018 03:15 AM    GFRNA >60 04/06/2018 03:15 AM    CA 8.7 04/06/2018 03:15 AM    MG 2.4 04/06/2018 03:15 AM    PHOS 4.7 04/06/2018 03:15 AM    ALB 2.5 (L) 04/06/2018 03:15 AM    TP 4.6 (L) 04/06/2018 03:15 AM    GLOB 2.1 04/06/2018 03:15 AM    AGRAT 1.2 04/06/2018 03:15 AM    SGOT 19 04/06/2018 03:15 AM    ALT 20 04/06/2018 03:15 AM     CBC: Lab Results   Component Value Date/Time    WBC 6.3 04/06/2018 03:15 AM    HGB 7.4 (L) 04/06/2018 03:15 AM    HCT 23.4 (L) 04/06/2018 03:15 AM     04/06/2018 03:15 AM           Assessment/Plan     Principal Problem:    Lumbar radiculopathy (3/29/2018)    Active Problems:    Radiculopathy, thoracic region (3/29/2018)      Acquired flat back syndrome (3/29/2018)        Plan: will continue to follow . difficult patient as her pain control requires creative use of medications

## 2018-04-06 NOTE — ROUTINE PROCESS
0700: Bedside shift change report given to Willy Bryant RN (oncoming nurse) by Lindsay Malcolm RN (offgoing nurse). Report included the following information SBAR, Kardex, OR Summary, Procedure Summary, Intake/Output, MAR, Accordion, Recent Results and Med Rec Status. 1300: Pt at bedside, pt walked, then sat up to eat lunch. 1700: Pt OOB to take walk, refused to sit up in chair. Stated she wanted to wait until her  arrived. 1900: Bedside shift change report given to FREYA Johnson RN (oncoming nurse) by Willy Bryant RN (offgoing nurse). Report included the following information SBAR, Kardex, OR Summary, Procedure Summary, Intake/Output, MAR, Accordion, Recent Results and Med Rec Status.

## 2018-04-06 NOTE — PROGRESS NOTES
Problem: Falls - Risk of  Goal: *Absence of Falls  Document Ivania Fall Risk and appropriate interventions in the flowsheet.    Outcome: Progressing Towards Goal  Fall Risk Interventions:  Mobility Interventions: Patient to call before getting OOB         Medication Interventions: Patient to call before getting OOB, Teach patient to arise slowly    Elimination Interventions: Call light in reach, Patient to call for help with toileting needs, Toileting schedule/hourly rounds

## 2018-04-06 NOTE — DIABETES MGMT
NUTRITIONAL RE-ASSESSMENT / PLAN OF CARE     Maria Guadalupe Mean           60 y.o.           3/29/2018                Patient Active Problem List   Diagnosis Code    Chronic low back pain M54.5, G89.29    Degeneration of lumbar or lumbosacral intervertebral disc M51.37    Encounter for long-term (current) use of high-risk medication Z79.899    Myalgia and myositis, unspecified [id number]    Spasm of muscle M62.838    Lumbar stenosis M48.061    Hypertension I10    Osteoarthritis of right hip M16.11    Osteoarthritis of left hip M16.12    Lumbar radiculopathy M54.16    Radiculopathy, thoracic region M54.14    Acquired flat back syndrome M40.30      INTERVENTIONS/PLAN:     Monitor and encourage adequate po intake. ASSESSMENT:   Pt is 99% ideal wt; BMI (calculated): 20.8 kg/m2 (normal BMI). Po intake is improving. SUBJECTIVE/OBJECTIVE:   Information obtained from: pt  Pt with Chronic back pain, lumbar radiculopathy, flat back syndrome s/p prolonged back surgery 3/29 with blood loss and hypotension.     Diet: regular    Patient Vitals for the past 100 hrs:   % Diet Eaten   04/04/18 1820 100 %   04/04/18 1529 75 %   04/02/18 1319 50 %   04/02/18 0937 15 %       Medications: [x]                Reviewed   IVF:  NS at 100 ml/hr    Most Recent POC Glucose:   Recent Labs      04/06/18   0315  04/05/18   0400  04/04/18   0450   GLU  83  102*  111*         Labs:   Lab Results   Component Value Date/Time    Hemoglobin A1c 5.6 02/03/2016 02:36 PM     Lab Results   Component Value Date/Time    Sodium 143 04/06/2018 03:15 AM    Potassium 4.3 04/06/2018 03:15 AM    Chloride 104 04/06/2018 03:15 AM    CO2 35 (H) 04/06/2018 03:15 AM    Anion gap 4 04/06/2018 03:15 AM    Glucose 83 04/06/2018 03:15 AM    BUN 12 04/06/2018 03:15 AM    Creatinine 0.53 (L) 04/06/2018 03:15 AM    Calcium 8.7 04/06/2018 03:15 AM    Magnesium 2.4 04/06/2018 03:15 AM    Phosphorus 4.7 04/06/2018 03:15 AM    Albumin 2.5 (L) 04/06/2018 03:15 AM Anthropometrics: IBW : 59 kg (130 lb), % IBW (Calculated): 99.08 %, BMI (calculated): 20.8  Wt Readings from Last 1 Encounters:   02/09/16 65.3 kg (144 lb)      Ht Readings from Last 1 Encounters:   03/29/18 5' 6\" (1.676 m)       Estimated Nutrition Needs:  1531 Kcals/day, Protein (g): 70 g Fluid (ml): 1800 ml  Based on:   [x]          Actual BW    []          ABW   []            Adjusted BW      Nutrition Diagnoses:   Pt is well nourished. Nutrition Interventions:  None at this time. Goal:   Pt will consume >75% meals by 4/11/18. Weight maintenance (+/- 1-2 kg) by 4/16/18.         Nutrition Monitoring and Evaluation      [x]     Monitor po intake on meal rounds  [x]     Continue inpatient monitoring and intervention  []     Other:      Nutrition Risk:  []   High     []  Moderate    [x]  Minimal/Uncompromised    To Barrios RD

## 2018-04-06 NOTE — PROGRESS NOTES
Problem: Mobility Impaired (Adult and Pediatric)  Goal: *Acute Goals and Plan of Care (Insert Text)  Physical Therapy Goals  Initiated 4/3/2018 and to be accomplished within 7 day(s)  1. Patient will move from supine to sit and sit to supine  in bed with modified independence. 2.  Patient will maintain seated at edge of bed for 10 min with modified independence to prepare for out of bed activity. 3.  Patient will perform sit to stand with modified independence. 4.  Patient will transfer from bed to chair and chair to bed with modified independence using the least restrictive device. 5.  Patient will ambulate with modified independence for 150 feet with the least restrictive device. 6.  Patient will ascend/descend 12 stairs with handrail(s) with modified independence. 7. Patient will verbalize 3/3 lumbar precautions. 8. Patient will demonstrate compliance with lumbar precautions greater than 90% of session. 9. Patient will demonstrate appropriate use of incentive spirometer to aid in pulomnary compliance for mobility. Outcome: Progressing Towards Goal  physical Therapy TREATMENT    Patient: Rylan Roca (11 y.o. female)  Date: 4/6/2018  Diagnosis: thoracic lumbar radiculopathy   m54.16  m54.14  Radiculopathy, thoracic region  Lumbar radiculopathy  Acquired flat back syndrome Lumbar radiculopathy  Procedure(s) (LRB):  exposure of previous t10 - l3 fusion/ removal of hardware l3 pedicle subtraction osteotomy instrumentation ilium tto t10, bilateral duralrepair (N/A)  instrumentation ilium to possible t4 (N/A) 8 Days Post-Op  Precautions: Fall, Back, Spinal  Chart, physical therapy assessment, plan of care and goals were reviewed. PLOF: ind    ASSESSMENT:  Pt presents up in chair without TLSO on. Appeared much brighter today. Smiling. Sisters present.   Unable to recall Lumbar prec., and initiating forward bend from waist in sitting in chair to don pants; provided education and redirected for donning pants w knee crossed over thigh; donned  without c/o pain. Donned brace in standing MOD A  Ambulating with increased kristan today and decreased pelvic obliquity. RW left in room. Appears much more comfortable today. Cont to encourage deep breathing. EDUCATION:Lumbar prec,. No bending; instructed  donning slacks LB dressing. OOB all meals. Amb w nursing. Progression toward goals:         Improving slowly and progressing toward goals     PLAN:  Patient continues to benefit from skilled intervention to address the above impairments. Continue treatment per established plan of care. Discharge Recommendations:  pt states she is going to [facility] Recommendations for Discharge: RW     SUBJECTIVE:   Patient stated it's not bad (referring to pain level).     OBJECTIVE DATA SUMMARY:   Critical Behavior:  Neurologic State: Alert, Eyes open spontaneously  Orientation Level: Oriented X4  Cognition: Follows commands  Safety/Judgement: Fall prevention    G CODE:Mobility [id number] Current  CL= 60-79%   Goal  CI= 1-19%. The severity rating is based on the Other [facility] Standing Balance Scale  2: Pt supports self independently with both upper extremities (walker, crutches, parallel bars). CL, 60% to <80% impaired. Functional Mobility Training:  Bed Mobility: sit to supine SBA                 Transfers:  Sit to Stand: Stand-by assistance;Contact guard assistance  Stand to Sit: Contact guard assistance  Bed to Chair: Stand-by assistance  Interventions: Safety awareness training;Verbal cues     Balance:  Sitting: Intact  Standing: With support  Standing - Static: Good  Standing - Dynamic : Fair  Ambulation/Gait Training:  Distance (ft): 120 Feet (ft)  Assistive Device: Brace/Splint;Gait belt;Walker, rolling  Ambulation - Level of Assistance: Stand-by assistance;Contact guard assistance  Gait Abnormalities: Antalgic; Altered arm swing;Decreased step clearance; Other (decresed pelvic obliquity demo'ed today)  Speed/Kristan:  (increased kristan today )  Interventions: Safety awareness training;Verbal cues  Vital Signs  Temp: 98.8 °F (37.1 °C)     Pulse (Heart Rate): 92     BP: (!) 128/94     Resp Rate: 20     O2 Sat (%): 97 %  Pain:  Didn't verbalize numeric however visual 3/10 pre & post  Pre treatment pain level:  Post treatment pain level:  Pain Scale 1: VISUAL (0 - 10)  Pain Intensity 1: 3  Pain Location 1: Back  Pain Intervention(s) 1: Medication (see MAR)  Activity Tolerance:   fair  After treatment:   Patient left in no apparent distress in bed  w SCD's applied  Call bell left within reach  [facility] notified  Three sisters present    Eddie Collazo, PTA   Time Calculation: 19 mins     [address]    DO NOT TWIST your back. DO NOT BEND to  objects. Use the Squat Lift method or use a *REACHER STICK. Get out of bed using the Log Roll method. DO NOT LIFT more than 5 pounds until cleared by your physician. DO NOT RIDE in a car except to go home from the hospital or to see your physician. DO NOT DRIVE until cleared by your physician. Limit sitting to less than one hour at a time. Use a long handled back brush/sponge to wash your feet if you cannot reach them. Squat or sit on a chair when removing items from the refrigerator. Put all frequently used kitchen items within easy reach. Sit to put on pants, socks, and shoes. Do not perform lower body dressing while standing up. Carry items close to your body. If needed, sit on a shower chair and use an extended hand-held shower for bathing. Do not shower until cleared by physician. Conserve energy by pacing  yourself during your daily activities. *Reachers are available at local drug stores for about $10 - $15 or can be ordered from a catalog provided by the therapy department.   In drug stores they are often sold  under the name of [employer] [place]. 

## 2018-04-07 LAB
BASOPHILS # BLD: 0 K/UL (ref 0–0.06)
BASOPHILS NFR BLD: 0 % (ref 0–2)
CA-I SERPL-SCNC: 1.25 MMOL/L (ref 1.12–1.32)
DIFFERENTIAL METHOD BLD: ABNORMAL
EOSINOPHIL # BLD: 0.6 K/UL (ref 0–0.4)
EOSINOPHIL NFR BLD: 10 % (ref 0–5)
ERYTHROCYTE [DISTWIDTH] IN BLOOD BY AUTOMATED COUNT: 16.1 % (ref 11.6–14.5)
HCT VFR BLD AUTO: 24.1 % (ref 35–45)
HGB BLD-MCNC: 7.3 G/DL (ref 12–16)
LYMPHOCYTES # BLD: 1.5 K/UL (ref 0.9–3.6)
LYMPHOCYTES NFR BLD: 26 % (ref 21–52)
MAGNESIUM SERPL-MCNC: 2.4 MG/DL (ref 1.6–2.6)
MCH RBC QN AUTO: 28.1 PG (ref 24–34)
MCHC RBC AUTO-ENTMCNC: 30.3 G/DL (ref 31–37)
MCV RBC AUTO: 92.7 FL (ref 74–97)
MONOCYTES # BLD: 0.5 K/UL (ref 0.05–1.2)
MONOCYTES NFR BLD: 9 % (ref 3–10)
NEUTS SEG # BLD: 3 K/UL (ref 1.8–8)
NEUTS SEG NFR BLD: 55 % (ref 40–73)
PHOSPHATE SERPL-MCNC: 4.6 MG/DL (ref 2.5–4.9)
PLATELET # BLD AUTO: 258 K/UL (ref 135–420)
PMV BLD AUTO: 8.5 FL (ref 9.2–11.8)
RBC # BLD AUTO: 2.6 M/UL (ref 4.2–5.3)
WBC # BLD AUTO: 5.5 K/UL (ref 4.6–13.2)

## 2018-04-07 PROCEDURE — 74011250637 HC RX REV CODE- 250/637: Performed by: NEUROLOGICAL SURGERY

## 2018-04-07 PROCEDURE — 83735 ASSAY OF MAGNESIUM: CPT | Performed by: NEUROLOGICAL SURGERY

## 2018-04-07 PROCEDURE — 84100 ASSAY OF PHOSPHORUS: CPT | Performed by: NEUROLOGICAL SURGERY

## 2018-04-07 PROCEDURE — 85025 COMPLETE CBC W/AUTO DIFF WBC: CPT | Performed by: NEUROLOGICAL SURGERY

## 2018-04-07 PROCEDURE — 97530 THERAPEUTIC ACTIVITIES: CPT

## 2018-04-07 PROCEDURE — 82330 ASSAY OF CALCIUM: CPT | Performed by: NEUROLOGICAL SURGERY

## 2018-04-07 PROCEDURE — 74011250637 HC RX REV CODE- 250/637: Performed by: NURSE PRACTITIONER

## 2018-04-07 PROCEDURE — 97535 SELF CARE MNGMENT TRAINING: CPT

## 2018-04-07 PROCEDURE — 77010033678 HC OXYGEN DAILY

## 2018-04-07 PROCEDURE — 74011250637 HC RX REV CODE- 250/637: Performed by: HOSPITALIST

## 2018-04-07 PROCEDURE — 65610000006 HC RM INTENSIVE CARE

## 2018-04-07 RX ADMIN — Medication 10 ML: at 18:03

## 2018-04-07 RX ADMIN — PANTOPRAZOLE SODIUM 40 MG: 40 TABLET, DELAYED RELEASE ORAL at 08:40

## 2018-04-07 RX ADMIN — Medication 10 ML: at 21:26

## 2018-04-07 RX ADMIN — DULOXETINE HYDROCHLORIDE 60 MG: 60 CAPSULE, DELAYED RELEASE ORAL at 18:01

## 2018-04-07 RX ADMIN — CYCLOBENZAPRINE HYDROCHLORIDE 10 MG: 10 TABLET, FILM COATED ORAL at 11:55

## 2018-04-07 RX ADMIN — IBUPROFEN 600 MG: 200 TABLET, FILM COATED ORAL at 05:28

## 2018-04-07 RX ADMIN — IBUPROFEN 600 MG: 200 TABLET, FILM COATED ORAL at 18:00

## 2018-04-07 RX ADMIN — OXYCODONE HYDROCHLORIDE 30 MG: 5 TABLET ORAL at 21:20

## 2018-04-07 RX ADMIN — ACETAMINOPHEN 500 MG: 500 TABLET ORAL at 23:24

## 2018-04-07 RX ADMIN — HYDROMORPHONE HYDROCHLORIDE 8 MG: 2 TABLET ORAL at 15:37

## 2018-04-07 RX ADMIN — MAGNESIUM HYDROXIDE 30 ML: 400 SUSPENSION ORAL at 08:41

## 2018-04-07 RX ADMIN — OXYCODONE HYDROCHLORIDE 30 MG: 5 TABLET ORAL at 16:51

## 2018-04-07 RX ADMIN — GABAPENTIN 600 MG: 300 CAPSULE ORAL at 08:41

## 2018-04-07 RX ADMIN — Medication 10 ML: at 05:29

## 2018-04-07 RX ADMIN — ACETAMINOPHEN 500 MG: 500 TABLET ORAL at 05:28

## 2018-04-07 RX ADMIN — ACETAMINOPHEN 500 MG: 500 TABLET ORAL at 18:01

## 2018-04-07 RX ADMIN — GABAPENTIN 600 MG: 300 CAPSULE ORAL at 21:20

## 2018-04-07 RX ADMIN — OXYCODONE HYDROCHLORIDE 30 MG: 5 TABLET ORAL at 01:52

## 2018-04-07 RX ADMIN — CYCLOBENZAPRINE HYDROCHLORIDE 10 MG: 10 TABLET, FILM COATED ORAL at 19:56

## 2018-04-07 RX ADMIN — Medication 10 ML: at 15:39

## 2018-04-07 RX ADMIN — ACETAMINOPHEN 500 MG: 500 TABLET ORAL at 11:55

## 2018-04-07 RX ADMIN — HYDROMORPHONE HYDROCHLORIDE 8 MG: 2 TABLET ORAL at 08:40

## 2018-04-07 RX ADMIN — OXYCODONE HYDROCHLORIDE 30 MG: 5 TABLET ORAL at 12:31

## 2018-04-07 RX ADMIN — Medication 10 ML: at 05:28

## 2018-04-07 RX ADMIN — IBUPROFEN 600 MG: 200 TABLET, FILM COATED ORAL at 23:24

## 2018-04-07 RX ADMIN — Medication 20 ML: at 18:05

## 2018-04-07 RX ADMIN — OXYCODONE HYDROCHLORIDE 30 MG: 5 TABLET ORAL at 08:40

## 2018-04-07 RX ADMIN — Medication 10 ML: at 15:38

## 2018-04-07 RX ADMIN — IBUPROFEN 600 MG: 200 TABLET, FILM COATED ORAL at 00:00

## 2018-04-07 RX ADMIN — DULOXETINE HYDROCHLORIDE 60 MG: 60 CAPSULE, DELAYED RELEASE ORAL at 08:41

## 2018-04-07 RX ADMIN — IBUPROFEN 600 MG: 200 TABLET, FILM COATED ORAL at 11:55

## 2018-04-07 RX ADMIN — GABAPENTIN 600 MG: 300 CAPSULE ORAL at 15:37

## 2018-04-07 NOTE — PROGRESS NOTES
Neurosurgery Progress Note; post op day 9  Remains in stepdown/icu bed because on level of medications being given. S: doing well,seems pain is under better control, working with physical therapy  O: afebrile and stable vital signs. Wound is clean and dry hct stablizing int he 24% range.  Leg function is 5.5  A; beginning to show progress, suspect patient will be able to be discharge to home rather then [facility]  P;  Probable dc to home on monday

## 2018-04-07 NOTE — PROGRESS NOTES
Problem: Falls - Risk of  Goal: *Absence of Falls  Document Ivania Fall Risk and appropriate interventions in the flowsheet.    Outcome: Progressing Towards Goal  Fall Risk Interventions:  Mobility Interventions: Patient to call before getting OOB, PT Consult for mobility concerns, PT Consult for assist device competence         Medication Interventions: Evaluate medications/consider consulting pharmacy, Patient to call before getting OOB, Teach patient to arise slowly    Elimination Interventions: Call light in reach, Toileting schedule/hourly rounds, Toilet paper/wipes in reach, Patient to call for help with toileting needs

## 2018-04-07 NOTE — PROGRESS NOTES
Problem: Falls - Risk of  Goal: *Absence of Falls  Document Ivania Fall Risk and appropriate interventions in the flowsheet.    Outcome: Progressing Towards Goal  Fall Risk Interventions:  Mobility Interventions: Patient to call before getting OOB, OT consult for ADLs, PT Consult for mobility concerns         Medication Interventions: Teach patient to arise slowly, Patient to call before getting OOB    Elimination Interventions: Call light in reach, Patient to call for help with toileting needs, Toileting schedule/hourly rounds

## 2018-04-07 NOTE — PROGRESS NOTES
Progress Note      Patient: Noemi Gordon               Sex: female          DOA: 3/29/2018       YOB: 1960      Age:  60 y.o.        LOS:  LOS: 9 days               Subjective:   Pt was sitting up on the side of the bed and she was dressed . She had been walking a short distance with physical therapy . Pt may go home if she continues to improve as noted by dr Yovanny Mancilla . H/h is 7.3/24.1      Objective:      Visit Vitals    /67    Pulse 94    Temp 97.4 °F (36.3 °C)    Resp 18    Ht 5' 6\" (1.676 m)    Wt 58.4 kg (128 lb 12.8 oz)    SpO2 98%    BMI 20.79 kg/m2       Physical Exam:  Pt is awake and is alert   Heart reg rate and rhythm   Lungs good breath sounds heard   Abdomen soft and nontender . Back wounds are healing nicely   neuro nonfocal         Lab/Data Reviewed:  BMP: No results found for: NA, K, CL, CO2, AGAP, GLU, BUN, CREA, GFRAA, GFRNA  CMP:   Lab Results   Component Value Date/Time    MG 2.4 04/07/2018 04:10 AM    PHOS 4.6 04/07/2018 04:10 AM     CBC:   Lab Results   Component Value Date/Time    WBC 5.5 04/07/2018 04:10 AM    HGB 7.3 (L) 04/07/2018 04:10 AM    HCT 24.1 (L) 04/07/2018 04:10 AM     04/07/2018 04:10 AM           Assessment/Plan     Principal Problem:    Lumbar radiculopathy (3/29/2018)    Active Problems:    Radiculopathy, thoracic region (3/29/2018)      Acquired flat back syndrome (3/29/2018)        Plan:will watch the h/h and transfuse if necessary .  Continue pt and ot

## 2018-04-07 NOTE — PROGRESS NOTES
Problem: Mobility Impaired (Adult and Pediatric)  Goal: *Acute Goals and Plan of Care (Insert Text)  Physical Therapy Goals  Initiated 4/3/2018 and to be accomplished within 7 day(s)  1. Patient will move from supine to sit and sit to supine  in bed with modified independence. 2.  Patient will maintain seated at edge of bed for 10 min with modified independence to prepare for out of bed activity. 3.  Patient will perform sit to stand with modified independence. 4.  Patient will transfer from bed to chair and chair to bed with modified independence using the least restrictive device. 5.  Patient will ambulate with modified independence for 150 feet with the least restrictive device. 6.  Patient will ascend/descend 12 stairs with handrail(s) with modified independence. 7. Patient will verbalize 3/3 lumbar precautions. 8. Patient will demonstrate compliance with lumbar precautions greater than 90% of session. 9. Patient will demonstrate appropriate use of incentive spirometer to aid in pulomnary compliance for mobility. physical Therapy TREATMENT    Patient: Marco Cotter (56 y.o. female)  Date: 4/7/2018  Diagnosis: thoracic lumbar radiculopathy   m54.16  m54.14  Radiculopathy, thoracic region  Lumbar radiculopathy  Acquired flat back syndrome Lumbar radiculopathy  Procedure(s) (LRB):  exposure of previous t10 - l3 fusion/ removal of hardware l3 pedicle subtraction osteotomy instrumentation ilium tto t10, bilateral duralrepair (N/A)  instrumentation ilium to possible t4 (N/A) 9 Days Post-Op  Precautions: Fall, Back, Spinal  Chart, physical therapy assessment, plan of care and goals were reviewed. PLOF:     ASSESSMENT:  Pt is motivated and anxious to get out of bed and walk  Today.  She is taken to the stairwell to address deficits in stair negotiation (nurse present during session) and she was able to ascend/descend 3 steps with use of L handrail and foot to foot pattern with advancement of LLE 1st on step. She required VCs for foot placement on step and for sequencing for safety. EDUCATION:Pt educated on log roll, safety with stair negotiation and transfers. Progression toward goals:        Improving appropriately and progressing toward goals-yes        Improving slowly and progressing toward goals        Not making progress toward goals and plan of care will be adjusted     PLAN:  Patient continues to benefit from skilled intervention to address the above impairments. Continue treatment per established plan of care. Discharge Recommendations:  Home Health  Further Equipment Recommendations for Discharge:  N/A     SUBJECTIVE:   Patient stated .    OBJECTIVE DATA SUMMARY:   Critical Behavior:  Neurologic State: Alert  Orientation Level: Oriented X4  Cognition: Impulsive  Safety/Judgement: Fall prevention    G CODE:Mobility [id number] Current  CI= 1-19%. The severity rating is based on the functional assessment of patient. Functional Mobility Training:  Bed Mobility:  Rolling: Modified independent  Supine to Sit: Modified independent  Sit to Supine: Modified independent  Transfers:  Sit to Stand: Modified independent  Stand to Sit: Modified independent  Balance:  Sitting: Intact  Sitting - Static: Good (unsupported)  Sitting - Dynamic: Good (unsupported)  Standing: Intact; With support  Standing - Static: Good  Standing - Dynamic : Fair  Ambulation/Gait Training:  Distance (ft):  (203 ft)  Assistive Device: Walker, rolling;Gait belt;Brace/Splint  Ambulation - Level of Assistance: Supervision     Gait Description (WDL): Exceptions to WDL  Gait Abnormalities: Hip Hike (L hip) Possibly compensatory due to hip and numerous back surgeries. Base of Support: Widened  Stance: Right decreased  Speed/Leonie: Fluctuations  Step Length: Right shortened  Swing Pattern: Left asymmetrical  Interventions: Verbal cues  Stairs: Pt negotiated 3 standard, 8 inch steps using foot to foot pattern and with use of L handrail. She required VCs for sequencing patterns and for safe placement of foot on step Steve. Pt was also demonstrated negotiation of stairs with the use of a walker that she politely declined as she reports that if she requires addition assistance at home she will ask her  for assistance. This is her 9th surgery for this diagnosis and she reports not having difficulty getting in/out of home. Vital Signs  Temp: 97.4 °F (36.3 °C)     Pulse (Heart Rate): 94     BP: 138/67     Resp Rate: 18     O2 Sat (%): 98 %  Pain:  Pre treatment pain level:6/10  Post treatment pain level:7/10  Pain Scale 1: Numeric (0 - 10)  Pain Intensity 1: 7  Pain Location 1: Back  Pain Orientation 1: Posterior  Pain Description 1: Aching  Pain Intervention(s) 1: Medication (see MAR)  Activity Tolerance:   Good tolerance to skilled PT session, continue to address deficits in strength and mboility.      After treatment:     Patient left in no apparent distress in bed-yes  Call bell left within reach  Nursing notified-yes  Caregiver present-no  Bed alarm activated    Recommendations for nursing:-none, nurse was present during treatment session  Written on communication board: -no  Verbally communicated to: -stair negotiation    Lenoria Collar   Time Calculation: 27 mins

## 2018-04-07 NOTE — PROGRESS NOTES
Problem: Self Care Deficits Care Plan (Adult)  Goal: *Acute Goals and Plan of Care (Insert Text)  Occupational Therapy Goals  Initiated 4/3/2018 within 7 day(s). 1.  Patient will perform grooming tasks while standing with modified independence. 2.  Patient will perform lower body dressing with modified independence utilizing adaptive equipment, prn.  3.  Patient will perform functional task in standing for 8 minutes with modified independence to increase activity tolerance for ADLs. 4.  Patient will perform toilet transfers with modified independence. 5.  Patient will perform all aspects of toileting with modified independence. 6.  Patient will participate in upper extremity therapeutic exercise/activities with modified independence for 8 minutes to increase BUE strength for ADLs. 7.  Patient will utilize energy conservation techniques during functional activities with minimal verbal cues. Outcome: Progressing Towards Goal  Occupational Therapy TREATMENT    Patient: Alvin Kwan (30 y.o. female)  Date: 4/7/2018  Diagnosis: thoracic lumbar radiculopathy   m54.16  m54.14  Radiculopathy, thoracic region  Lumbar radiculopathy  Acquired flat back syndrome Lumbar radiculopathy  Procedure(s) (LRB):  exposure of previous t10 - l3 fusion/ removal of hardware l3 pedicle subtraction osteotomy instrumentation ilium tto t10, bilateral duralrepair (N/A)  instrumentation ilium to possible t4 (N/A) 9 Days Post-Op  Precautions: Fall, Back, Spinal  Chart, occupational therapy assessment, plan of care, and goals were reviewed. PLOF: Independent    ASSESSMENT:  Pt appears brighter today, motivated to participate w/therapy. Increase time and Min Assist required to secure shoulder closures on TLSO. Pt requires moderate vc's to demonstrate lumbar precautions (twisting) w/ADLs and functional transfers. Pt demonstrates good use of adaptive equipment w/LB dressing ADLs seated at EOB.  Good activity tolerance w/therapeutic activity using adaptive equipment for ADL carryover. Pt request return to supine demonstrating \"log roll\" technique. No c/o pain. EDUCATION Pt educated on use of adaptive equipment w/LB dressing ADLs, sock aid and reacher and issued same. Progression toward goals:  [x]          Improving appropriately and progressing toward goals  []          Improving slowly and progressing toward goals  []          Not making progress toward goals and plan of care will be adjusted     PLAN:  Patient continues to benefit from skilled intervention to address the above impairments. Continue treatment per established plan of care. Discharge Recommendations:  Home Health  Further Equipment Recommendations for Discharge:  N/A, pt reports she has DME     SUBJECTIVE:   Patient stated I feel much better today.     OBJECTIVE DATA SUMMARY:       Cognitive/Behavioral Status:  Neurologic State: Alert  Orientation Level: Oriented X4  Cognition: Impulsive  Safety/Judgement: Fall prevention  Functional Mobility and Transfers for ADLs:   Bed Mobility:  Supine to Sit: Modified independent  Sit to Supine: Modified independent   Transfers:  Sit to Stand: Modified independent  Balance:  Sitting: Intact  Standing: Intact; With support  Standing - Static: Good  Standing - Dynamic : Fair (fair plus)  ADL Intervention:  Grooming  Brushing/Combing Hair: Independent (seated EOB)    Upper Body Dressing Assistance  Orthotics(Brace): Minimum assistance    Lower Body Dressing Assistance  Underpants: Supervision/set-up (simulated w/pillowcase)  Socks: Supervision/set-up  Leg Crossed Method Used: No  Position Performed: Seated edge of bed  Cues: Don;Doff  Adaptive Equipment Used: Reacher;Sock aid    Therapeutic Activity:   Pt performs functional mobility w/RW and floor level item retrieval w/AE for ADL carryover.      Pain:  Pre Treatment:0  Post Treatment:0    Activity Tolerance:    Good    Please refer to the flowsheet for vital signs taken during this treatment.   After treatment:   []  Patient left in no apparent distress sitting up in chair  [x]  Patient left in no apparent distress in bed  [x]  Call bell left within reach  [x]  Nursing notified  []  Caregiver present  []  Bed alarm activated    GUNJAN Baker  Time Calculation: 34 mins

## 2018-04-08 LAB
ALBUMIN SERPL-MCNC: 2.5 G/DL (ref 3.4–5)
ALBUMIN/GLOB SERPL: 1.1 {RATIO} (ref 0.8–1.7)
ALP SERPL-CCNC: 109 U/L (ref 45–117)
ALT SERPL-CCNC: 18 U/L (ref 13–56)
ANION GAP SERPL CALC-SCNC: 6 MMOL/L (ref 3–18)
AST SERPL-CCNC: 17 U/L (ref 15–37)
BASOPHILS # BLD: 0 K/UL (ref 0–0.06)
BASOPHILS NFR BLD: 0 % (ref 0–2)
BILIRUB SERPL-MCNC: 0.1 MG/DL (ref 0.2–1)
BUN SERPL-MCNC: 11 MG/DL (ref 7–18)
BUN/CREAT SERPL: 21 (ref 12–20)
CA-I SERPL-SCNC: 1.25 MMOL/L (ref 1.12–1.32)
CALCIUM SERPL-MCNC: 8.4 MG/DL (ref 8.5–10.1)
CHLORIDE SERPL-SCNC: 105 MMOL/L (ref 100–108)
CO2 SERPL-SCNC: 32 MMOL/L (ref 21–32)
CREAT SERPL-MCNC: 0.53 MG/DL (ref 0.6–1.3)
DIFFERENTIAL METHOD BLD: ABNORMAL
EOSINOPHIL # BLD: 0.5 K/UL (ref 0–0.4)
EOSINOPHIL NFR BLD: 7 % (ref 0–5)
ERYTHROCYTE [DISTWIDTH] IN BLOOD BY AUTOMATED COUNT: 15.9 % (ref 11.6–14.5)
GLOBULIN SER CALC-MCNC: 2.2 G/DL (ref 2–4)
GLUCOSE SERPL-MCNC: 90 MG/DL (ref 74–99)
HCT VFR BLD AUTO: 22.8 % (ref 35–45)
HGB BLD-MCNC: 7.1 G/DL (ref 12–16)
LYMPHOCYTES # BLD: 1.3 K/UL (ref 0.9–3.6)
LYMPHOCYTES NFR BLD: 15 % (ref 21–52)
MAGNESIUM SERPL-MCNC: 2.3 MG/DL (ref 1.6–2.6)
MCH RBC QN AUTO: 28.9 PG (ref 24–34)
MCHC RBC AUTO-ENTMCNC: 31.1 G/DL (ref 31–37)
MCV RBC AUTO: 92.7 FL (ref 74–97)
MONOCYTES # BLD: 1 K/UL (ref 0.05–1.2)
MONOCYTES NFR BLD: 12 % (ref 3–10)
NEUTS SEG # BLD: 5.5 K/UL (ref 1.8–8)
NEUTS SEG NFR BLD: 66 % (ref 40–73)
PHOSPHATE SERPL-MCNC: 4.6 MG/DL (ref 2.5–4.9)
PLATELET # BLD AUTO: 199 K/UL (ref 135–420)
PMV BLD AUTO: 8.9 FL (ref 9.2–11.8)
POTASSIUM SERPL-SCNC: 4.3 MMOL/L (ref 3.5–5.5)
PROT SERPL-MCNC: 4.7 G/DL (ref 6.4–8.2)
RBC # BLD AUTO: 2.46 M/UL (ref 4.2–5.3)
SODIUM SERPL-SCNC: 143 MMOL/L (ref 136–145)
WBC # BLD AUTO: 8.2 K/UL (ref 4.6–13.2)

## 2018-04-08 PROCEDURE — 74011250637 HC RX REV CODE- 250/637: Performed by: NEUROLOGICAL SURGERY

## 2018-04-08 PROCEDURE — 84100 ASSAY OF PHOSPHORUS: CPT | Performed by: NEUROLOGICAL SURGERY

## 2018-04-08 PROCEDURE — 85025 COMPLETE CBC W/AUTO DIFF WBC: CPT | Performed by: NEUROLOGICAL SURGERY

## 2018-04-08 PROCEDURE — 77010033678 HC OXYGEN DAILY

## 2018-04-08 PROCEDURE — 97530 THERAPEUTIC ACTIVITIES: CPT

## 2018-04-08 PROCEDURE — 82330 ASSAY OF CALCIUM: CPT | Performed by: NEUROLOGICAL SURGERY

## 2018-04-08 PROCEDURE — 74011250637 HC RX REV CODE- 250/637: Performed by: HOSPITALIST

## 2018-04-08 PROCEDURE — 83735 ASSAY OF MAGNESIUM: CPT | Performed by: NEUROLOGICAL SURGERY

## 2018-04-08 PROCEDURE — 65610000006 HC RM INTENSIVE CARE

## 2018-04-08 PROCEDURE — 36592 COLLECT BLOOD FROM PICC: CPT

## 2018-04-08 PROCEDURE — 74011250637 HC RX REV CODE- 250/637: Performed by: NURSE PRACTITIONER

## 2018-04-08 PROCEDURE — 80053 COMPREHEN METABOLIC PANEL: CPT | Performed by: NEUROLOGICAL SURGERY

## 2018-04-08 RX ADMIN — GABAPENTIN 600 MG: 300 CAPSULE ORAL at 16:56

## 2018-04-08 RX ADMIN — DULOXETINE HYDROCHLORIDE 60 MG: 60 CAPSULE, DELAYED RELEASE ORAL at 08:51

## 2018-04-08 RX ADMIN — Medication 10 ML: at 06:32

## 2018-04-08 RX ADMIN — Medication 10 ML: at 17:00

## 2018-04-08 RX ADMIN — Medication 20 ML: at 19:29

## 2018-04-08 RX ADMIN — ACETAMINOPHEN 500 MG: 500 TABLET ORAL at 13:24

## 2018-04-08 RX ADMIN — Medication 10 ML: at 21:20

## 2018-04-08 RX ADMIN — ACETAMINOPHEN 500 MG: 500 TABLET ORAL at 06:31

## 2018-04-08 RX ADMIN — OXYCODONE HYDROCHLORIDE 30 MG: 5 TABLET ORAL at 13:22

## 2018-04-08 RX ADMIN — OXYCODONE HYDROCHLORIDE 30 MG: 5 TABLET ORAL at 21:19

## 2018-04-08 RX ADMIN — CYCLOBENZAPRINE HYDROCHLORIDE 10 MG: 10 TABLET, FILM COATED ORAL at 19:57

## 2018-04-08 RX ADMIN — Medication 10 ML: at 17:01

## 2018-04-08 RX ADMIN — DULOXETINE HYDROCHLORIDE 60 MG: 60 CAPSULE, DELAYED RELEASE ORAL at 18:00

## 2018-04-08 RX ADMIN — GABAPENTIN 600 MG: 300 CAPSULE ORAL at 08:51

## 2018-04-08 RX ADMIN — OXYCODONE HYDROCHLORIDE 30 MG: 5 TABLET ORAL at 09:21

## 2018-04-08 RX ADMIN — OXYCODONE HYDROCHLORIDE 30 MG: 5 TABLET ORAL at 02:58

## 2018-04-08 RX ADMIN — Medication 10 ML: at 21:21

## 2018-04-08 RX ADMIN — IBUPROFEN 600 MG: 200 TABLET, FILM COATED ORAL at 06:32

## 2018-04-08 RX ADMIN — ACETAMINOPHEN 500 MG: 500 TABLET ORAL at 17:31

## 2018-04-08 RX ADMIN — Medication 10 ML: at 19:29

## 2018-04-08 RX ADMIN — HYDROMORPHONE HYDROCHLORIDE 8 MG: 2 TABLET ORAL at 16:56

## 2018-04-08 RX ADMIN — HYDROMORPHONE HYDROCHLORIDE 8 MG: 2 TABLET ORAL at 08:51

## 2018-04-08 RX ADMIN — OXYCODONE HYDROCHLORIDE 30 MG: 5 TABLET ORAL at 17:31

## 2018-04-08 RX ADMIN — CYCLOBENZAPRINE HYDROCHLORIDE 10 MG: 10 TABLET, FILM COATED ORAL at 10:11

## 2018-04-08 RX ADMIN — GABAPENTIN 600 MG: 300 CAPSULE ORAL at 21:19

## 2018-04-08 RX ADMIN — PANTOPRAZOLE SODIUM 40 MG: 40 TABLET, DELAYED RELEASE ORAL at 06:31

## 2018-04-08 NOTE — ROUTINE PROCESS
0700: Bedside shift change report given to Alexandre Helm RN (oncoming nurse) by Hossein Mora RN (offgoing nurse). Report included the following information SBAR, Kardex, OR Summary, Procedure Summary, Intake/Output, MAR, Accordion, Recent Results and Med Rec Status. Pt. Has been on 3 walks today with RN, OT and PT.    2000: Bedside shift change report given to Vanna Holstein, RN (oncoming nurse) by Alexandre Helm RN (offgoing nurse). Report included the following information SBAR, Kardex, OR Summary, Procedure Summary, Intake/Output, MAR, Accordion, Recent Results and Med Rec Status.

## 2018-04-08 NOTE — PROGRESS NOTES
Problem: Mobility Impaired (Adult and Pediatric)  Goal: *Acute Goals and Plan of Care (Insert Text)  Physical Therapy Goals  Initiated 4/3/2018 and to be accomplished within 7 day(s)  1. Patient will move from supine to sit and sit to supine  in bed with modified independence. 2.  Patient will maintain seated at edge of bed for 10 min with modified independence to prepare for out of bed activity. 3.  Patient will perform sit to stand with modified independence. 4.  Patient will transfer from bed to chair and chair to bed with modified independence using the least restrictive device. 5.  Patient will ambulate with modified independence for 150 feet with the least restrictive device. 6.  Patient will ascend/descend 12 stairs with handrail(s) with modified independence. 7. Patient will verbalize 3/3 lumbar precautions. 8. Patient will demonstrate compliance with lumbar precautions greater than 90% of session. 9. Patient will demonstrate appropriate use of incentive spirometer to aid in pulomnary compliance for mobility. Outcome: Progressing Towards Goal  physical Therapy TREATMENT    Patient: Marie Mota (81 y.o. female)  Date: 4/8/2018  Diagnosis: thoracic lumbar radiculopathy   m54.16  m54.14  Radiculopathy, thoracic region  Lumbar radiculopathy  Acquired flat back syndrome Lumbar radiculopathy  Procedure(s) (LRB):  exposure of previous t10 - l3 fusion/ removal of hardware l3 pedicle subtraction osteotomy instrumentation ilium tto t10, bilateral duralrepair (N/A)  instrumentation ilium to possible t4 (N/A) 10 Days Post-Op  Precautions: Fall, Back, Spinal  Chart, physical therapy assessment, plan of care and goals were reviewed. PLOF: independent    ASSESSMENT:  Pt is progressing well towards goals and ambulating approx 200ft with supervision using FWW, requiring cues for adhering with spinal precautions especially \"no twisting\" and maintaining upright posture during mobility.  Pt able to ascend/descend 10 steps CGA using L HR only with VC's for correct LE stepping sequencing to prep for climbing steps at home. Pt demonstrates improve LE stability leading with L LE ascending and R LE descending steps. EDUCATION: Pt education on importance of maintaining back precautions during transfers, gait, and stairs to promote healing and minimize c/o pain. Pt verbalized understanding. Progression toward goals:   X   Improving appropriately and progressing toward goals        Improving slowly and progressing toward goals        Not making progress toward goals and plan of care will be adjusted     PLAN:  Patient continues to benefit from skilled intervention to address the above impairments. Continue treatment per established plan of care. Discharge Recommendations:  Home Health  Further Equipment Recommendations for Discharge:  FWW     SUBJECTIVE:   Patient stated I walked for about 15 mins with NSG earlier\". OBJECTIVE DATA SUMMARY:   Critical Behavior:  Neurologic State: Alert  Orientation Level: Oriented X4  Cognition: Follows commands  Safety/Judgement: Fall prevention    G CODE:  Functional Mobility Training:  Bed Mobility:  Rolling: Modified independent  Supine to Sit: Modified independent  Transfers:  Sit to Stand: Modified independent  Stand to Sit: Modified independent  Balance:  Sitting: Intact  Sitting - Static: Good (unsupported)  Sitting - Dynamic: Good (unsupported)  Standing: Intact; With support  Standing - Static: Good  Standing - Dynamic : Good (-)  Ambulation/Gait Training:  Distance (ft): 200 Feet (ft)  Assistive Device: Brace/Splint;Gait belt;Walker, rolling  Ambulation - Level of Assistance: Supervision  Gait Abnormalities: Antalgic;Decreased step clearance  Base of Support: Center of gravity altered  Speed/Leonie: Slow  Step Length: Left shortened;Right shortened  Interventions: Safety awareness training;Verbal cues  Stairs:  Number of Stairs Trained: 10  Stairs - Level of Assistance: Contact guard assistance  Rail Use: Left   Neuro Re-Education:  Therapeutic Exercises:   Vital Signs  Temp: 98.8 °F (37.1 °C)     Pulse (Heart Rate): 80     BP: 116/53     Resp Rate: 15     O2 Sat (%): 95 %  Pain:  Pre treatment pain level: 6  Post treatment pain level: 6  Pain Scale 1: Numeric (0 - 10)  Pain Intensity 1: 6  Pain Location 1: Back  Pain Orientation 1: Posterior  Pain Description 1: Aching  Pain Intervention(s) 1: Medication (see MAR)  Activity Tolerance:   Good     After treatment:   Patient left in no apparent distress sitting up in chair X  Patient left in no apparent distress in bed  Call bell left within reach X  Nursing notified  Caregiver present  Bed alarm activated    [address], ROBER   Time Calculation: 24 mins

## 2018-04-08 NOTE — PROGRESS NOTES
Neurosurgery Progress Note;   Post op day 8  S:  Patient is starting to progress, will plan to discharge home with family in the morning with follow up with her pain management doctor this week  O: doing well looking forward to going home tomorrow. Wound is clean and dry voiding, legs feel better ambulating in the unit.    A; doing well  P: discharge tomorrow am to outpat

## 2018-04-08 NOTE — PROGRESS NOTES
Progress Note      Patient: Sandy Padilla               Sex: female          DOA: 3/29/2018       YOB: 1960      Age:  60 y.o.        LOS:  LOS: 10 days               Subjective:   Pt is now walking in the halls . She will go home in the am ..  her  was walking with her in the halls . Objective:      Visit Vitals    /53    Pulse 80    Temp 98.8 °F (37.1 °C)    Resp 15    Ht 5' 6\" (1.676 m)    Wt 58.4 kg (128 lb 12.8 oz)    SpO2 95%    BMI 20.79 kg/m2       Physical Exam:  Pt is awake and is alert . she always says that she is having pain   Heart reg rate and rhythm   Lungs good breath sounds heard   Abdomen soft and nontender   Neuro walks without too much difficulty      Lab/Data Reviewed:  BMP:   Lab Results   Component Value Date/Time     04/08/2018 04:00 AM    K 4.3 04/08/2018 04:00 AM     04/08/2018 04:00 AM    CO2 32 04/08/2018 04:00 AM    AGAP 6 04/08/2018 04:00 AM    GLU 90 04/08/2018 04:00 AM    BUN 11 04/08/2018 04:00 AM    CREA 0.53 (L) 04/08/2018 04:00 AM    GFRAA >60 04/08/2018 04:00 AM    GFRNA >60 04/08/2018 04:00 AM     CMP:   Lab Results   Component Value Date/Time     04/08/2018 04:00 AM    K 4.3 04/08/2018 04:00 AM     04/08/2018 04:00 AM    CO2 32 04/08/2018 04:00 AM    AGAP 6 04/08/2018 04:00 AM    GLU 90 04/08/2018 04:00 AM    BUN 11 04/08/2018 04:00 AM    CREA 0.53 (L) 04/08/2018 04:00 AM    GFRAA >60 04/08/2018 04:00 AM    GFRNA >60 04/08/2018 04:00 AM    CA 8.4 (L) 04/08/2018 04:00 AM    MG 2.3 04/08/2018 04:00 AM    PHOS 4.6 04/08/2018 04:00 AM    ALB 2.5 (L) 04/08/2018 04:00 AM    TP 4.7 (L) 04/08/2018 04:00 AM    GLOB 2.2 04/08/2018 04:00 AM    AGRAT 1.1 04/08/2018 04:00 AM    SGOT 17 04/08/2018 04:00 AM    ALT 18 04/08/2018 04:00 AM     CBC:   Lab Results   Component Value Date/Time    WBC 8.2 04/08/2018 04:00 AM    HGB 7.1 (L) 04/08/2018 04:00 AM    HCT 22.8 (L) 04/08/2018 04:00 AM     04/08/2018 04:00 AM Assessment/Plan   Pt is s/p L2-L4 fusion with removal of hardware   H/o chronic pain   Analgesic dependence   Plan: will be dc'd in the am

## 2018-04-08 NOTE — ROUTINE PROCESS
0700: Bedside shift change report given to Reji Morales RN (oncoming nurse) by Alfonso Fay RN (offgoing nurse). Report included the following information SBAR, Kardex, OR Summary, Procedure Summary, Intake/Output, MAR, Accordion, Recent Results and Med Rec Status. Pt up to walk on unit x3. Walk durations were 15 minutes, 20 minutes with stairs, and 10 minutes. 1900: Bedside shift change report given to SARAH Saunders (oncoming nurse) by Reji Morales RN (offgoing nurse). Report included the following information SBAR, Kardex, OR Summary, Procedure Summary, Intake/Output, MAR, Accordion, Recent Results and Med Rec Status.

## 2018-04-08 NOTE — ROUTINE PROCESS
2000  Bedside and Verbal shift change report given to Tere Hsu (oncoming nurse) by Selene Garrett (offgoing nurse). Report included the following information SBAR, Kardex, Intake/Output and MAR. Pt awake and alert at this time. 2120  Shift assessment complete. Pt c/o pain 8/10 at this time. PRN medication administered. 0400  Reassessment complete. No change in pt condition    0720  Bedside and Verbal shift change report given to Ashley EL (oncoming nurse) by Tere Hsu (offgoing nurse). Report included the following information SBAR, Kardex, Intake/Output and MAR.

## 2018-04-09 LAB
CA-I SERPL-SCNC: 1.29 MMOL/L (ref 1.12–1.32)
MAGNESIUM SERPL-MCNC: 2.1 MG/DL (ref 1.6–2.6)
PHOSPHATE SERPL-MCNC: 4.9 MG/DL (ref 2.5–4.9)

## 2018-04-09 PROCEDURE — 82330 ASSAY OF CALCIUM: CPT | Performed by: NEUROLOGICAL SURGERY

## 2018-04-09 PROCEDURE — 77030027138 HC INCENT SPIROMETER -A

## 2018-04-09 PROCEDURE — 74011250637 HC RX REV CODE- 250/637: Performed by: NURSE PRACTITIONER

## 2018-04-09 PROCEDURE — 83735 ASSAY OF MAGNESIUM: CPT | Performed by: NEUROLOGICAL SURGERY

## 2018-04-09 PROCEDURE — 84100 ASSAY OF PHOSPHORUS: CPT | Performed by: NEUROLOGICAL SURGERY

## 2018-04-09 PROCEDURE — 65270000029 HC RM PRIVATE

## 2018-04-09 PROCEDURE — 74011250637 HC RX REV CODE- 250/637: Performed by: HOSPITALIST

## 2018-04-09 PROCEDURE — 74011250637 HC RX REV CODE- 250/637: Performed by: NEUROLOGICAL SURGERY

## 2018-04-09 PROCEDURE — 97530 THERAPEUTIC ACTIVITIES: CPT

## 2018-04-09 RX ORDER — GABAPENTIN 400 MG/1
800 CAPSULE ORAL 3 TIMES DAILY
Status: DISCONTINUED | OUTPATIENT
Start: 2018-04-09 | End: 2018-04-10 | Stop reason: HOSPADM

## 2018-04-09 RX ADMIN — OXYCODONE HYDROCHLORIDE 30 MG: 5 TABLET ORAL at 12:40

## 2018-04-09 RX ADMIN — Medication 10 ML: at 14:48

## 2018-04-09 RX ADMIN — GABAPENTIN 600 MG: 300 CAPSULE ORAL at 08:43

## 2018-04-09 RX ADMIN — Medication 40 ML: at 22:17

## 2018-04-09 RX ADMIN — ACETAMINOPHEN 500 MG: 500 TABLET ORAL at 00:17

## 2018-04-09 RX ADMIN — Medication 10 ML: at 15:57

## 2018-04-09 RX ADMIN — OXYCODONE HYDROCHLORIDE 30 MG: 5 TABLET ORAL at 22:16

## 2018-04-09 RX ADMIN — Medication 10 ML: at 06:52

## 2018-04-09 RX ADMIN — IBUPROFEN 600 MG: 200 TABLET, FILM COATED ORAL at 06:51

## 2018-04-09 RX ADMIN — DULOXETINE HYDROCHLORIDE 60 MG: 60 CAPSULE, DELAYED RELEASE ORAL at 08:44

## 2018-04-09 RX ADMIN — ACETAMINOPHEN 500 MG: 500 TABLET ORAL at 11:47

## 2018-04-09 RX ADMIN — OXYCODONE HYDROCHLORIDE 30 MG: 5 TABLET ORAL at 08:09

## 2018-04-09 RX ADMIN — OXYCODONE HYDROCHLORIDE 30 MG: 5 TABLET ORAL at 16:50

## 2018-04-09 RX ADMIN — ACETAMINOPHEN 500 MG: 500 TABLET ORAL at 18:14

## 2018-04-09 RX ADMIN — ACETAMINOPHEN 500 MG: 500 TABLET ORAL at 06:52

## 2018-04-09 RX ADMIN — HYDROMORPHONE HYDROCHLORIDE 8 MG: 2 TABLET ORAL at 09:36

## 2018-04-09 RX ADMIN — IBUPROFEN 600 MG: 200 TABLET, FILM COATED ORAL at 00:17

## 2018-04-09 RX ADMIN — Medication 20 ML: at 18:00

## 2018-04-09 RX ADMIN — IBUPROFEN 600 MG: 200 TABLET, FILM COATED ORAL at 18:14

## 2018-04-09 RX ADMIN — IBUPROFEN 600 MG: 200 TABLET, FILM COATED ORAL at 11:47

## 2018-04-09 RX ADMIN — Medication 10 ML: at 15:58

## 2018-04-09 RX ADMIN — GABAPENTIN 800 MG: 400 CAPSULE ORAL at 15:52

## 2018-04-09 RX ADMIN — GABAPENTIN 800 MG: 400 CAPSULE ORAL at 22:16

## 2018-04-09 RX ADMIN — DULOXETINE HYDROCHLORIDE 60 MG: 60 CAPSULE, DELAYED RELEASE ORAL at 18:00

## 2018-04-09 RX ADMIN — Medication 10 ML: at 22:17

## 2018-04-09 RX ADMIN — MAGNESIUM HYDROXIDE 30 ML: 400 SUSPENSION ORAL at 08:44

## 2018-04-09 RX ADMIN — PANTOPRAZOLE SODIUM 40 MG: 40 TABLET, DELAYED RELEASE ORAL at 08:44

## 2018-04-09 RX ADMIN — Medication 10 ML: at 22:18

## 2018-04-09 RX ADMIN — Medication 10 ML: at 18:00

## 2018-04-09 NOTE — PROGRESS NOTES
I talked with patient, she has been able to ambulate  Well and stair walk with therapy. She states she will be going home, said she thought it would be with [address] [place] [address] Seth Tyler PT She says this is her 9th surgery and she is comfortable going home instead of going to [facility], [facility].. Her transition plan is to go home, her  to drive her. Katy Dewey.  [address] [facility]  [phone], beeper [phone]

## 2018-04-09 NOTE — PROGRESS NOTES
Progress Note    Patient: Christi Goel MRN: [id number]  SSN: [id number]    YOB: 1960  Age: 60 y.o. Sex: female      Admit Date: 3/29/2018    LOS: 11 days     Subjective:   Mobilized some yesterday. A bit more dysesthesia in the right thigh (non-radicular), affects only the anterolateral thigh-- most consistent with meralgia paresthetica. Denies HA. Objective:     Vitals:    04/09/18 0300 04/09/18 0400 04/09/18 0509 04/09/18 0604   BP: 104/61 105/67 95/54 100/81   Pulse: 83 83 80 73   Resp: 12 17 18 18   Temp:  98.2 °F (36.8 °C)     SpO2:  94%  96%   Weight:       Height:            Intake and Output:  Current Shift:    Last three shifts: 04/07 1901 - 04/09 0700  In: 9778 [P.O.:1610]  Out: 1050 [Urine:1050]    Physical Exam:   Alert and appropriate. Motor 5/5 for all- no weakness appreciated. Lt grossly intact save some alteration in right lateral femoral cutaneous nerve distribution. Wound c/d/i; flat     Thighs and calves non-tender to deep palpation; extremity swelling absent. Lab/Data Review:  Recent Results (from the past 24 hour(s))   MAGNESIUM    Collection Time: 04/09/18  4:10 AM   Result Value Ref Range    Magnesium 2.1 1.6 - 2.6 mg/dL   PHOSPHORUS    Collection Time: 04/09/18  4:10 AM   Result Value Ref Range    Phosphorus 4.9 2.5 - 4.9 MG/DL   CALCIUM, IONIZED    Collection Time: 04/09/18  4:10 AM   Result Value Ref Range    Ionized Calcium 1.29 1.12 - 1.32 MMOL/L       Assessment:     Principal Problem:    Lumbar radiculopathy (3/29/2018)    Active Problems:    Radiculopathy, thoracic region (3/29/2018)      Acquired flat back syndrome (3/29/2018)        Plan:   1. Mobilize today and transfer to floor  2. Will bump up the neurontin dose again  3.   Will shoot for discharge tomorrow    Signed By: Maru Nieves MD     April 9, 2018

## 2018-04-09 NOTE — PROGRESS NOTES
Problem: Mobility Impaired (Adult and Pediatric)  Goal: *Acute Goals and Plan of Care (Insert Text)  Physical Therapy Goals  Initiated 4/3/2018 and to be accomplished within 7 day(s)  1. Patient will move from supine to sit and sit to supine  in bed with modified independence. 2.  Patient will maintain seated at edge of bed for 10 min with modified independence to prepare for out of bed activity. 3.  Patient will perform sit to stand with modified independence. 4.  Patient will transfer from bed to chair and chair to bed with modified independence using the least restrictive device. 5.  Patient will ambulate with modified independence for 150 feet with the least restrictive device. 6.  Patient will ascend/descend 12 stairs with handrail(s) with modified independence. 7. Patient will verbalize 3/3 lumbar precautions. 8. Patient will demonstrate compliance with lumbar precautions greater than 90% of session. 9. Patient will demonstrate appropriate use of incentive spirometer to aid in pulomnary compliance for mobility. Outcome: Progressing Towards Goal  physical Therapy TREATMENT    Patient: Tonya Harrell (49 y.o. female)  Date: 4/9/2018  Diagnosis: thoracic lumbar radiculopathy   m54.16  m54.14  Radiculopathy, thoracic region  Lumbar radiculopathy  Acquired flat back syndrome Lumbar radiculopathy  Procedure(s) (LRB):  exposure of previous t10 - l3 fusion/ removal of hardware l3 pedicle subtraction osteotomy instrumentation ilium tto t10, bilateral duralrepair (N/A)  instrumentation ilium to possible t4 (N/A) 11 Days Post-Op  Precautions: Fall, Back, Spinal; brace  Chart, physical therapy assessment, plan of care and goals were reviewed. PLOF: ind    ASSESSMENT: Presents in bed. Demos modified independence w mobility. Needs occ reminders for no twisting. Dons brace w set up. Has met goals 1,4,5, and 8 for the PT POC.  Cont w slight pelvic obliquity/trendelenburg during stance on R but has decreased since last week. Reports heel of R foot numb. Request defer stairs today. Has completed stairs over weekend without difficulty. EDUCATION:Instructed diaphragmatic breathing and issued handout for same. Reinforce lumbar prec., issued handout for isometrics   Progression toward goals:           Improving slowly and progressing toward goals     PLAN:  Patient continues to benefit from skilled intervention to address the above impairments. Continue treatment per established plan of care. Discharge Recommendations:  none  Further Equipment Recommendations for Discharge: none     SUBJECTIVE:   Patient stated Im going home from here.     OBJECTIVE DATA SUMMARY:   Critical Behavior:  Neurologic State: Alert, Appropriate for age  Orientation Level: Oriented X4  Cognition: Follows commands  Safety/Judgement: Fall prevention    G CODE:Mobility [id number] Current  CJ= 20-39%   Goal  CI= 1-19%. The severity rating is based on the Other [facility] Standing Balance Scale  4: Pt independently moves and returns center of gravity 1-2 inches in one plane. CJ, 20% to <40% impaired. Functional Mobility Training:  Bed Mobility: modified independence for sup <> sit  Transfers: Mod IND for sit <> stand  Balance: intact  Ambulation/Gait Training: ambulating 200 ft w TLSO, RW level surfaces . Therapeutic Exercises:   AP, quad and glut isometric x 10 w 3 sec hold. Instructed diaphragmatic breathing and issued handout for same.    Vital Signs  Temp: 98.1 °F (36.7 °C)     Pulse (Heart Rate): 92     BP: 113/72     Resp Rate: 22     O2 Sat (%): 96 %  Pain:  Pre treatment pain level:6  Post treatment pain level:6  Pain Scale 1: Numeric (0 - 10)  Pain Intensity 1: 6  Pain Location 1: Back  Pain Orientation 1: Posterior  Pain Description 1: Aching  Pain Intervention(s) 1: Medication (see MAR)  Activity Tolerance:   fair  After treatment:   Patient left in no apparent distress in bed  Call bell left within reach  Nursing notified    Lawerance Glasslauren, PTA   Time Calculation: 24 mins

## 2018-04-09 NOTE — PROGRESS NOTES
Problem: Falls - Risk of  Goal: *Absence of Falls  Document Ivania Fall Risk and appropriate interventions in the flowsheet.    Outcome: Progressing Towards Goal  Fall Risk Interventions:  Mobility Interventions: Patient to call before getting OOB, Strengthening exercises (ROM-active/passive), Utilize walker, cane, or other assitive device         Medication Interventions: Evaluate medications/consider consulting pharmacy, Patient to call before getting OOB, Teach patient to arise slowly    Elimination Interventions: Call light in reach, Patient to call for help with toileting needs, Toileting schedule/hourly rounds, Toilet paper/wipes in reach

## 2018-04-09 NOTE — ROUTINE PROCESS
Bedside shift change report given to Ivy Colón   (oncoming nurse) by Liborio Jacobson (offgoing nurse). Report included the following information SBAR, OR Summary, Procedure Summary, Intake/Output, Med Rec Status, Cardiac Rhythm NSR and Alarm Parameters .      1004- Attempt to call report to SARAH Nathan unavailable

## 2018-04-09 NOTE — PROGRESS NOTES
Progress Note      Patient: Aba Officer               Sex: female          DOA: 3/29/2018       YOB: 1960      Age:  60 y.o.        LOS:  LOS: 11 days             CHIEF COMPLAINT:  Lumbar stenosis    Subjective:     Patient awake and talking  No distress    Objective:      Visit Vitals    /72    Pulse 92    Temp 98.1 °F (36.7 °C)    Resp 22    Ht 5' 6\" (1.676 m)    Wt 58.4 kg (128 lb 12.8 oz)    SpO2 96%    BMI 20.79 kg/m2       Physical Exam:  Gen:  No distress, no complaint  Lungs:  Clear bilaterally, no wheeze or rhonchi  Heart:  Regular rate and rhythm, no murmurs or gallops  Abdomen:  Soft, non-tender, normal bowel sounds        Lab/Data Reviewed:    Labs reviewed        Assessment/Plan     Principal Problem:    Lumbar radiculopathy (3/29/2018)    Active Problems:    Radiculopathy, thoracic region (3/29/2018)      Acquired flat back syndrome (3/29/2018)        Plan:  Continue with mobilization  Pain control  DVT prophylaxis.

## 2018-04-09 NOTE — PROGRESS NOTES
Offered the pt FOC for home care, she chose [facility]. Pt verified the contact information on the face sheet is correct. DC pending. Care Management Interventions  PCP Verified by CM: Yes (Dr Shawnee Church)  Last Visit to PCP: 01/28/18  Mode of Transport at Discharge:  Other (see comment)  Transition of Care Consult (CM Consult): [address]: Yes  Current Support Network: Lives with Spouse, Own Home (2 stories)  Confirm Follow Up Transport: Family  Plan discussed with Pt/Family/Caregiver: Yes  Freedom of Choice Offered: Yes  Discharge Location  Discharge Placement: Home with home health

## 2018-04-09 NOTE — PROGRESS NOTES
Bedside shift report received from [address] [place] [address]: [address] [facility], on room air, resting in bed with significant other at bedside; shift assessment done; NSR on tele; Flexeril given po as requested for muscle spasms; NSR on tele    2119: c/o 10/10 deep aching pain over back area; Roxicodone 30 mg po given as requested- see flowsheet          : Assisted with bedside commode use; had 200 ml of clear sheyla colored urine    2200: resting comfortably; vital signs stable    0200: sleeping; NSR on tele    0500: sleeping; NSR on tele; awakens and responds to verbal stimuli; callbell within reach    0605: awake, \"I'm going home today\" pt claims cheerfully; NSR on tele; 02 sat 96%    0640: assisted with bedside commode use and back to bed; tolerated activity well    0710: Bedside and Verbal shift change report given to Mamie Rodriguez RN (oncoming nurse) by Alex Cervantes RN (offgoing nurse). Report included the following information SBAR, Kardex, Intake/Output, Recent Results and Cardiac Rhythm NSR.

## 2018-04-10 VITALS
HEART RATE: 84 BPM | TEMPERATURE: 98.4 F | HEIGHT: 66 IN | RESPIRATION RATE: 16 BRPM | OXYGEN SATURATION: 94 % | SYSTOLIC BLOOD PRESSURE: 123 MMHG | BODY MASS INDEX: 20.7 KG/M2 | WEIGHT: 128.8 LBS | DIASTOLIC BLOOD PRESSURE: 76 MMHG

## 2018-04-10 PROBLEM — M40.30: Status: RESOLVED | Noted: 2018-03-29 | Resolved: 2018-04-10

## 2018-04-10 PROBLEM — M54.16 LUMBAR RADICULOPATHY: Status: RESOLVED | Noted: 2018-03-29 | Resolved: 2018-04-10

## 2018-04-10 PROBLEM — M54.14 RADICULOPATHY, THORACIC REGION: Status: RESOLVED | Noted: 2018-03-29 | Resolved: 2018-04-10

## 2018-04-10 LAB
CA-I SERPL-SCNC: 1.29 MMOL/L (ref 1.12–1.32)
MAGNESIUM SERPL-MCNC: 2 MG/DL (ref 1.6–2.6)
PHOSPHATE SERPL-MCNC: 4.2 MG/DL (ref 2.5–4.9)

## 2018-04-10 PROCEDURE — 74011250637 HC RX REV CODE- 250/637: Performed by: NEUROLOGICAL SURGERY

## 2018-04-10 PROCEDURE — 83735 ASSAY OF MAGNESIUM: CPT | Performed by: NEUROLOGICAL SURGERY

## 2018-04-10 PROCEDURE — 84100 ASSAY OF PHOSPHORUS: CPT | Performed by: NEUROLOGICAL SURGERY

## 2018-04-10 PROCEDURE — 36592 COLLECT BLOOD FROM PICC: CPT

## 2018-04-10 PROCEDURE — 82330 ASSAY OF CALCIUM: CPT | Performed by: NEUROLOGICAL SURGERY

## 2018-04-10 RX ORDER — CYCLOBENZAPRINE HCL 10 MG
10 TABLET ORAL
Qty: 40 TAB | Refills: 0 | Status: SHIPPED | OUTPATIENT
Start: 2018-04-10

## 2018-04-10 RX ORDER — HYDROMORPHONE HYDROCHLORIDE 8 MG/1
8 TABLET ORAL
Qty: 40 TAB | Refills: 0 | Status: SHIPPED | OUTPATIENT
Start: 2018-04-10

## 2018-04-10 RX ORDER — OXYCODONE HYDROCHLORIDE 30 MG/1
30 TABLET ORAL
Qty: 40 TAB | Refills: 0 | Status: SHIPPED | OUTPATIENT
Start: 2018-04-10

## 2018-04-10 RX ORDER — GABAPENTIN 400 MG/1
800 CAPSULE ORAL 3 TIMES DAILY
Qty: 60 CAP | Refills: 0 | Status: SHIPPED | OUTPATIENT
Start: 2018-04-10

## 2018-04-10 RX ORDER — NALOXONE HYDROCHLORIDE 0.4 MG/ML
0.4 INJECTION, SOLUTION INTRAMUSCULAR; INTRAVENOUS; SUBCUTANEOUS AS NEEDED
Qty: 2 ML | Refills: 0 | Status: SHIPPED | OUTPATIENT
Start: 2018-04-10

## 2018-04-10 RX ADMIN — OXYCODONE HYDROCHLORIDE 30 MG: 5 TABLET ORAL at 06:07

## 2018-04-10 RX ADMIN — HYDROMORPHONE HYDROCHLORIDE 8 MG: 2 TABLET ORAL at 00:55

## 2018-04-10 RX ADMIN — IBUPROFEN 600 MG: 200 TABLET, FILM COATED ORAL at 00:55

## 2018-04-10 RX ADMIN — ACETAMINOPHEN 500 MG: 500 TABLET ORAL at 00:55

## 2018-04-10 RX ADMIN — ACETAMINOPHEN 500 MG: 500 TABLET ORAL at 06:07

## 2018-04-10 RX ADMIN — IBUPROFEN 600 MG: 200 TABLET, FILM COATED ORAL at 06:07

## 2018-04-10 NOTE — PROGRESS NOTES
Problem: Falls - Risk of  Goal: *Absence of Falls  Document Ivania Fall Risk and appropriate interventions in the flowsheet.    Outcome: Progressing Towards Goal  Fall Risk Interventions:  Mobility Interventions: Communicate number of staff needed for ambulation/transfer, Patient to call before getting OOB, PT Consult for mobility concerns, Utilize walker, cane, or other assitive device    Medication Interventions: Teach patient to arise slowly    Elimination Interventions: Call light in reach, Patient to call for help with toileting needs, Toileting schedule/hourly rounds

## 2018-04-10 NOTE — ROUTINE PROCESS
Bedside and Verbal shift change report given to Allegra RN by Sara Marrero RN. Report included the following information SBAR, Kardex, Intake/Output and MAR.

## 2018-04-10 NOTE — PROGRESS NOTES
Care Management Interventions  PCP Verified by CM: Yes (Dr Elsa Ospina)  Last Visit to PCP: 01/28/18  Mode of Transport at Discharge:  Other (see comment)  Transition of Care Consult (CM Consult): [address]: Yes  Current Support Network: Lives with Spouse, Own Home (2 stories)  Confirm Follow Up Transport: Family  Plan discussed with Pt/Family/Caregiver: Yes  Freedom of Choice Offered: Yes  Discharge Location  Discharge Placement: Home with home health

## 2018-04-10 NOTE — DISCHARGE SUMMARY
Discharge Summary    Patient: Diane Tavera               Sex: female          DOA: 3/29/2018         YOB: 1960      Age:  60 y.o.        LOS:  LOS: 12 days                Admit Date: 3/29/2018    Discharge Date: 4/10/2018    Admission Diagnoses: thoracic lumbar radiculopathy   m54.16  m54.14  Radiculopathy, thoracic region  Lumbar radiculopathy  Acquired flat back syndrome    Discharge Diagnoses:    Problem List as of 4/10/2018  Date Reviewed: 2/9/2016          Codes Class Noted - Resolved    Osteoarthritis of left hip (Chronic) ICD-10-CM: M16.12  ICD-9-CM: 715.95  2/4/2016 - Present        Osteoarthritis of right hip (Chronic) ICD-10-CM: M16.11  ICD-9-CM: 715.95  8/30/2015 - Present        Hypertension (Chronic) ICD-10-CM: I10  ICD-9-CM: 401.9  7/28/2014 - Present        Lumbar stenosis ICD-10-CM: M48.061  ICD-9-CM: 724.02  7/25/2014 - Present        Chronic low back pain ICD-10-CM: M54.5, G89.29  ICD-9-CM: 724.2, 338.29  8/11/2012 - Present        Degeneration of lumbar or lumbosacral intervertebral disc ICD-10-CM: M51.37  ICD-9-CM: 722.52  8/11/2012 - Present        Encounter for long-term (current) use of high-risk medication ICD-10-CM: Z79.899  ICD-9-CM: V58.69  8/11/2012 - Present        Myalgia and myositis, unspecified ICD-10-CM: [id number]  ICD-9-CM: 729.1  8/11/2012 - Present        Spasm of muscle ICD-10-CM: M62.838  ICD-9-CM: 728.85  8/11/2012 - Present        * (Principal)RESOLVED: Lumbar radiculopathy ICD-10-CM: M54.16  ICD-9-CM: 724.4  3/29/2018 - 4/10/2018        RESOLVED: Radiculopathy, thoracic region ICD-10-CM: M54.14  ICD-9-CM: 724.4  3/29/2018 - 4/10/2018        RESOLVED: Acquired flat back syndrome ICD-10-CM: M40.30  ICD-9-CM: 737.29  3/29/2018 - 4/10/2018              Discharge Condition: Good    Hospital Course: patient admitted for surgery. Long history of chronic pain with opioid use. Patient of Dr. Remy Jacobo of pain management, surgery was uncomplicated.  Post op required icu observation for medication monitoring and vital monitoring. Progressed slowly. Conversation with Dr. Yaya Casanova helped managed the medication. Improvement of preop symtpoms home today with family, will see Dr. Yaya Casanova this week    Consults: Hospitalist    Significant Diagnostic Studies: none    Discharge Medications:     Current Discharge Medication List      START taking these medications    Details   gabapentin (NEURONTIN) 400 mg capsule Take 2 Caps by mouth three (3) times daily. Qty: 60 Cap, Refills: 0    Associated Diagnoses: Degeneration of lumbar or lumbosacral intervertebral disc      HYDROmorphone (DILAUDID) 8 mg tablet Take 1 Tab by mouth every eight (8) hours as needed. Max Daily Amount: 24 mg. Qty: 40 Tab, Refills: 0    Associated Diagnoses: Chronic midline low back pain with sciatica, sciatica laterality unspecified      naloxone (NARCAN) 0.4 mg/mL injection Take 1 mL by inhalation as needed for Other (Give if breaths per minute are less than 10, may repeat dose in 15 min and contact MD). Qty: 2 mL, Refills: 0         CONTINUE these medications which have CHANGED    Details   cyclobenzaprine (FLEXERIL) 10 mg tablet Take 1 Tab by mouth three (3) times daily as needed for Muscle Spasm(s). Indications: Muscle Spasm  Qty: 40 Tab, Refills: 0    Associated Diagnoses: Chronic midline low back pain with bilateral sciatica      oxyCODONE IR (ROXICODONE) 30 mg immediate release tablet Take 1 Tab by mouth every four (4) hours as needed. Max Daily Amount: 180 mg.  Qty: 40 Tab, Refills: 0    Associated Diagnoses: Degeneration of lumbar or lumbosacral intervertebral disc         CONTINUE these medications which have NOT CHANGED    Details   DULoxetine (CYMBALTA) 60 mg capsule Take 60 mg by mouth two (2) times a day. Indications: NEUROPATHIC PAIN      metoprolol-XL (TOPROL XL) 25 mg XL tablet Take 25 mg by mouth daily.  Indications: Heart palpitation             Activity: No heavy lifting, pushing, pulling with the implant side for 2 months    Diet: Regular Diet    Wound Care: Keep wound clean and dry may shower    Follow-up: 2-3 weeks

## 2018-04-10 NOTE — DISCHARGE INSTRUCTIONS
Learning About Degenerative Disc Disease  What is degenerative disc disease? Degenerative disc disease is not really a disease. It's a term used to describe the normal changes in your spinal discs as you age. Spinal discs are small, spongy discs that separate the bones (vertebrae) that make up the spine. The discs act as shock absorbers for the spine, so it can flex, bend, and twist.  Degenerative disc disease can take place in one or more places along the spine. It most often occurs in the discs in the lower back and the neck. What causes it? As we age, our spinal discs break down, or degenerate. This breakdown causes the symptoms of degenerative disc disease in some people. When the discs break down, they can lose fluid and dry out, and their outer layers can have tiny cracks or tears. This leads to less padding and less space between the bones in the spine. The body reacts to this by making bony growths on the spine called bone spurs. These spurs can press on the spinal nerve roots or spinal cord. This can cause pain and can affect how well the nerves work. What are the symptoms? Many people with degenerative disc disease have no pain. But others have severe pain or other symptoms that limit their activities. Some of the most common symptoms are:  · Pain in the back or neck. Where the pain occurs depends on which discs are affected. · Pain that gets worse when you move, such as bending over, reaching up, or twisting. · Pain that may occur in the rear end (buttocks), arm, or leg if a nerve is pinched. · Numbness or tingling in your arm or leg. How is it diagnosed? A doctor can often diagnose degenerative disc disease while doing a physical exam. If your exam shows no signs of a serious condition, imaging tests (such as an X-ray) aren't likely to help your doctor find the cause of your symptoms.   Sometimes degenerative disc disease is found when an X-ray is taken for another reason, such as an injury or other health problem. But even if the doctor finds degenerative disc disease, that doesn't always mean that you will have symptoms. How is it treated? There are several things you can do at home to manage pain from this problem. · To relieve pain, use ice or heat (whichever feels better) on the affected area. ¨ Put ice or a cold pack on the area for 10 to 20 minutes at a time. Put a thin cloth between the ice and your skin. ¨ Put a warm water bottle, a heating pad set on low, or a warm cloth on your back. Put a thin cloth between the heating pad and your skin. Do not go to sleep with a heating pad on your skin. · Ask your doctor if you can take acetaminophen (such as Tylenol) or nonsteroidal anti-inflammatory drugs, such as ibuprofen or naproxen. Your doctor can prescribe stronger medicines if needed. Be safe with medicines. Read and follow all instructions on the label. · Get some exercise every day. Exercise is one of the best ways to help your back feel better and stay better. It's best to start each exercise slowly. You may notice a little soreness, and that's okay. But if an exercise makes your pain worse, stop doing it. Here are things you can try:  ¨ Walking. It's the simplest and maybe the best activity for your back. It gets your blood moving and helps your muscles stay strong. ¨ Exercises that gently stretch and strengthen your stomach, back, and leg muscles. The stronger those muscles are, the better they're able to protect your back. If you have constant or severe pain in your back or spine, you may need other treatments, such as physical therapy. In some cases, your doctor may suggest surgery. Follow-up care is a key part of your treatment and safety. Be sure to make and go to all appointments, and call your doctor if you are having problems. It's also a good idea to know your test results and keep a list of the medicines you take. Where can you learn more?   Go to http://sridhar-neftali.info/. Enter O807 in the search box to learn more about \"Learning About Degenerative Disc Disease. \"  Current as of: March 21, 2017  Content Version: 11.5  © 0634-7606 [employer]. Care instructions adapted under license by [employer] (which disclaims liability or warranty for this information). If you have questions about a medical condition or this instruction, always ask your healthcare professional. [place] [address] any warranty or liability for your use of this information. Gabapentin (By mouth)   Gabapentin (young-a-PEN-tin)  Treats seizures and pain caused by shingles. Brand Name(s): ACTIVE-PAC with Gabapentin, Convenience Marcin, Cyclo/Richy 10/300 Pack, FusePaq Fanatrex, Richy-V, Gralise, [address] Pack, Neurontin, SmartRx Richy Kit   There may be other brand names for this medicine. When This Medicine Should Not Be Used: This medicine is not right for everyone. Do not use it if you had an allergic reaction to gabapentin. How to Use This Medicine:   Capsule, Liquid, Tablet  · Take your medicine as directed. Your dose may need to be changed several times to find what works best for you. If you have epilepsy, do not allow more than 12 hours to pass between doses. · Capsule: Swallow the capsule whole with plenty of water. Do not open, crush, or chew it. · Gralise® tablet: Swallow the tablet whole . Do not crush, break, or chew it. · Neurontin® tablet: If you break a tablet into 2 pieces, use the second half as your next dose. If you don't use it within 28 days, throw it away. · Measure the oral liquid medicine with a marked measuring spoon, oral syringe, or medicine cup. · This medicine should come with a Medication Guide. Ask your pharmacist for a copy if you do not have one. · Missed dose: Take a dose as soon as you remember. If it is almost time for your next dose, wait until then and take a regular dose.  Do not take extra medicine to make up for a missed dose. · Store the medicine in a closed container at room temperature, away from heat, moisture, and direct light. Store the Neurontin® oral liquid in the refrigerator. Do not freeze. Drugs and Foods to Avoid:   Ask your doctor or pharmacist before using any other medicine, including over-the-counter medicines, vitamins, and herbal products. · Some medicines can affect how gabapentin works. Tell your doctor if you also use any of the following:   ¨ Hydrocodone  ¨ Morphine  · If you take an antacid, wait at least 2 hours before you take gabapentin. · Tell your doctor if you use anything else that makes you sleepy. Some examples are allergy medicine, narcotic pain medicine, and alcohol. Warnings While Using This Medicine:   · Tell your doctor if you are pregnant or breastfeeding, or if you have kidney problems or are receiving dialysis. Tell your doctor if you have a history of depression or mental health problems. · This medicine may increase depression or thoughts of suicide. Tell your doctor right away if you start to feel more depressed or think about hurting yourself. · This medicine may cause a serious allergic reaction called multiorgan hypersensitivity, which can damage organs and be life-threatening. · Do not stop using this medicine suddenly. Your doctor will need to slowly decrease your dose before you stop it completely. If you take this medicine to prevent seizures, your seizures may return or occur more often if you stop this medicine suddenly. · This medicine may make you dizzy or drowsy. Do not drive or do anything else that could be dangerous until you know how this medicine affects you. · Tell any doctor or dentist who treats you that you are using this medicine. This medicine may affect certain medical test results. · Your doctor will check your progress and the effects of this medicine at regular visits. Keep all appointments.   · Keep all medicine out of the reach of children. Never share your medicine with anyone. Possible Side Effects While Using This Medicine:   Call your doctor right away if you notice any of these side effects:  · Allergic reaction: Itching or hives, swelling in your face or hands, swelling or tingling in your mouth or throat, chest tightness, trouble breathing  · Behavior problems, aggression, restlessness, trouble concentrating, moodiness (especially in children)  · Blistering, peeling, red skin rash  · Change in how much or how often you urinate, bloody or cloudy urine,  · Chest pain, fast heartbeat, trouble breathing  · Dark urine or pale stools, nausea, vomiting, loss of appetite, stomach pain, yellow skin or eyes  · Fever, rash, swollen or tender glands in the neck, armpit, or groin  · Problems with coordination, shakiness, unsteadiness  · Rapid weight gain, swelling in your hands, ankles, or feet  · Unusual moods or behaviors, thoughts of hurting yourself, feeling depressed  If you notice these less serious side effects, talk with your doctor:   · Dizziness, drowsiness, sleepiness, tiredness  If you notice other side effects that you think are caused by this medicine, tell your doctor. Call your doctor for medical advice about side effects. You may report side effects to FDA at 4-785-FDA-6684  © 2017 [facility] Information is for End User's use only and may not be sold, redistributed or otherwise used for commercial purposes. The above information is an  only. It is not intended as medical advice for individual conditions or treatments. Talk to your doctor, nurse or pharmacist before following any medical regimen to see if it is safe and effective for you. Back Pain: Care Instructions  Your Care Instructions    Back pain has many possible causes. It is often related to problems with muscles and ligaments of the back.  It may also be related to problems with the nerves, discs, or bones of the back. Moving, lifting, standing, sitting, or sleeping in an awkward way can strain the back. Sometimes you don't notice the injury until later. Arthritis is another common cause of back pain. Although it may hurt a lot, back pain usually improves on its own within several weeks. Most people recover in 12 weeks or less. Using good home treatment and being careful not to stress your back can help you feel better sooner. Follow-up care is a key part of your treatment and safety. Be sure to make and go to all appointments, and call your doctor if you are having problems. It's also a good idea to know your test results and keep a list of the medicines you take. How can you care for yourself at home? · Sit or lie in positions that are most comfortable and reduce your pain. Try one of these positions when you lie down:  ¨ Lie on your back with your knees bent and supported by large pillows. ¨ Lie on the floor with your legs on the seat of a sofa or chair. Vanesa Sermons on your side with your knees and hips bent and a pillow between your legs. ¨ Lie on your stomach if it does not make pain worse. · Do not sit up in bed, and avoid soft couches and twisted positions. Bed rest can help relieve pain at first, but it delays healing. Avoid bed rest after the first day of back pain. · Change positions every 30 minutes. If you must sit for long periods of time, take breaks from sitting. Get up and walk around, or lie in a comfortable position. · Try using a heating pad on a low or medium setting for 15 to 20 minutes every 2 or 3 hours. Try a warm shower in place of one session with the heating pad. · You can also try an ice pack for 10 to 15 minutes every 2 to 3 hours. Put a thin cloth between the ice pack and your skin. · Take pain medicines exactly as directed. ¨ If the doctor gave you a prescription medicine for pain, take it as prescribed.   ¨ If you are not taking a prescription pain medicine, ask your doctor if you can take an over-the-counter medicine. · Take short walks several times a day. You can start with 5 to 10 minutes, 3 or 4 times a day, and work up to longer walks. Walk on level surfaces and avoid hills and stairs until your back is better. · Return to work and other activities as soon as you can. Continued rest without activity is usually not good for your back. · To prevent future back pain, do exercises to stretch and strengthen your back and stomach. Learn how to use good posture, safe lifting techniques, and proper body mechanics. When should you call for help? Call your doctor now or seek immediate medical care if:  ? · You have new or worsening numbness in your legs. ? · You have new or worsening weakness in your legs. (This could make it hard to stand up.)   ? · You lose control of your bladder or bowels. ? Watch closely for changes in your health, and be sure to contact your doctor if:  ? · Your pain gets worse. ? · You are not getting better after 2 weeks. Where can you learn more? Go to http://sridhar-neftali.info/. Enter N659 in the search box to learn more about \"Back Pain: Care Instructions. \"  Current as of: March 21, 2017  Content Version: 11.4  © 8183-4325 [employer]. Care instructions adapted under license by [employer] (which disclaims liability or warranty for this information). If you have questions about a medical condition or this instruction, always ask your healthcare professional. [place] [address] any warranty or liability for your use of this information. Cyclobenzaprine (Flexeril, Amrix, Fexmid, FusePaq Tabradol) - (By mouth)   Why this medicine is used:   Treats pain and stiffness caused by muscle spasms.   Contact a nurse or doctor right away if you have:  · Anxiety, restlessness, twitching  · Seeing or hearing things that are not there  · Fast, pounding, or uneven heartbeat  · Fever, sweating, nausea, vomiting, diarrhea     Common side effects:  · Dry mouth  · Dizziness, drowsiness  © 2017 [address] Information is for End User's use only and may not be sold, redistributed or otherwise used for commercial purposes. Hydromorphone (By mouth)   Hydromorphone (jnn-rvef-DKQ-fone)  Treats moderate to severe pain. This medicine is a narcotic pain reliever. Brand Name(s): Dilaudid, Exalgo   There may be other brand names for this medicine. When This Medicine Should Not Be Used: This medicine is not right for everyone. Do not use it if you had an allergic reaction to hydromorphone or sulfites, or if you have severe lung or breathing problems, or stomach or bowel blockage (including paralytic ileus). How to Use This Medicine:   Long Acting Capsule, Liquid, Tablet, Long Acting Tablet  · Take your medicine as directed. Your dose may need to be changed several times to find what works best for you. An overdose can be dangerous. Follow directions carefully so you do not get too much medicine at one time. · Oral liquid: Measure the oral liquid medicine with a marked measuring spoon, oral syringe, or medicine cup. If you get any of the oral liquid on your skin, rinse it with cool water right away. · Swallow the extended-release capsule whole. Do not crush, break, or chew it. · Swallow the extended-release tablet whole. Do not crush, break, or chew it. · If you take the extended-release tablet, part of the tablet may pass into your stools. This is normal and is nothing to worry about. · Hydromorphone extended-release capsules or tablets work differently than regular hydromorphone tablets, even at the same dose. Do not switch from one form to another unless your doctor tells you to. · This medicine should come with a Medication Guide. Ask your pharmacist for a copy if you do not have one. · Missed dose:   ¨ Extended-release capsules or tablets:  If you miss a dose, skip the missed dose and take your next dose at the usual time the next day. Do not double doses. ¨ Oral liquid: Take a dose as soon as you remember. If it is almost time for your next dose, wait until then and take a regular dose. Do not take extra medicine to make up for a missed dose. · Store the medicine in a closed container at room temperature, away from heat, moisture, and direct light. Store the medicine in a safe and secure place. Do not throw unused medicine in the trash. Ask your pharmacist about the best way to dispose of medicine you do not use. Drugs and Foods to Avoid:   Ask your doctor or pharmacist before using any other medicine, including over-the-counter medicines, vitamins, and herbal products. · Do not use this medicine if you are using or have used an MAO inhibitor within the past 14 days. · Some medicines can affect how hydromorphone works. Tell your doctor if you are using any of the following:   ¨ Blood pressure medicine  ¨ Diuretic (water pill)  ¨ Medicine to treat depression  ¨ Phenothiazine medicine  · Do not drink alcohol while you are using this medicine. · Tell your doctor if you use anything else that makes you sleepy. Some examples are allergy medicine, narcotic pain medicine, and alcohol. Tell your doctor if you are also using buprenorphine, butorphanol, nalbuphine, pentazocine, or a muscle relaxer. Warnings While Using This Medicine:   · Tell your doctor if you are pregnant or breastfeeding, or if you have kidney disease, liver disease, lung disease or breathing problems (such as asthma or COPD), an underactive thyroid, adrenal problems, cystic fibrosis, pancreas problems, gallbladder problems, an enlarged prostate, trouble urinating, or stomach or bowel problems. Tell your doctor if you have a history of head injury, brain tumor, seizures, depression, or alcohol or drug abuse.   · This medicine may cause the following problems:  ¨ High risk of overdose, which can lead to death  ¨ Respiratory depression (serious breathing problem that can be life-threatening)  ¨ Serotonin syndrome, when used with certain medicines  · This medicine can be habit-forming. Do not use more than your prescribed dose. Call your doctor if you think your medicine is not working. · Do not stop using this medicine suddenly. Your doctor will need to slowly decrease your dose before you stop it completely. · This medicine may make you dizzy, drowsy, or lightheaded. Do not drive or do anything else that could be dangerous until you know how this medicine affects you. Sit or lie down if you feel dizzy. Stand up carefully. · This medicine could cause infertility. Talk with your doctor before using this medicine if you plan to have children. · This medicine may cause constipation, especially with long-term use. Ask your doctor if you should use a laxative to prevent and treat constipation. · Keep all medicine out of the reach of children. Never share your medicine with anyone. Possible Side Effects While Using This Medicine:   Call your doctor right away if you notice any of these side effects:  · Allergic reaction: Itching or hives, swelling in your face or hands, swelling or tingling in your mouth or throat, chest tightness, trouble breathing  · Anxiety, restlessness, fast heartbeat, fever, sweating, muscle spasms, twitching, nausea, vomiting, diarrhea, seeing or hearing things that are not there  · Blue lips, fingernails, or skin  · Extreme dizziness or weakness, shallow breathing, slow or uneven heartbeat, sweating, cold or clammy skin, seizures  · Severe confusion, lightheadedness, dizziness, fainting  · Severe constipation, stomach pain, or vomiting  · Trouble breathing or slow breathing  If you notice these less serious side effects, talk with your doctor:   · Mild constipation, nausea, or vomiting  · Mild sleepiness or tiredness  If you notice other side effects that you think are caused by this medicine, tell your doctor.    Call your doctor for medical advice about side effects. You may report side effects to FDA at 2-863-XZU-7973  © 2017 [address] Information is for End User's use only and may not be sold, redistributed or otherwise used for commercial purposes. The above information is an  only. It is not intended as medical advice for individual conditions or treatments. Talk to your doctor, nurse or pharmacist before following any medical regimen to see if it is safe and effective for you. Oxycodone, Rapid Release (ETH-Oxydose, Oxy IR, Roxicodone) - (By mouth)   Why this medicine is used:   Treats moderate to severe pain. This medicine is a narcotic pain reliever. Contact a nurse or doctor right away if you have:  · Fast or slow heart beat, shallow breathing, blue lips, skin or fingernails  · Anxiety, restlessness, fever, sweating, muscle spasms, twitching, seeing or hearing things that are not there  · Extreme weakness, shallow breathing, slow heartbeat  · Severe confusion, lightheadedness, dizziness, fainting  · Sweating or cold, clammy skin, seizures  · Severe constipation, stomach pain, nausea, vomiting     Common side effects:  · Mild constipation  · Sleepiness, tiredness  © 2017 [address] Information is for End User's use only and may not be sold, redistributed or otherwise used for commercial purposes. Learning About Naloxone for Opioid Overdose  What is naloxone? Opioids are strong pain medicines. Examples include hydrocodone, oxycodone, fentanyl, and morphine. Heroin is an example of an illegal opioid. Taking too much of an opioid can cause death. An overdose is an emergency. Naloxone is a medicine that reverses the effects of an overdose. If you take it soon enough after an overdose, it can save your life. Naloxone comes in a rescue kit you can carry with you. You may hear it called a Narcan kit for short. The rescue kit may contain:  · The medicine.   · Syringes and needles. · A nasal adapter for the syringes. · A separate nasal spray device. Your doctor can give you a prescription for a rescue kit and show you how to use it. In some places you can buy Narcan kits without a prescription. When is naloxone used? Naloxone is used when a person shows signs of an opioid overdose. A person may have overdosed if he or she is:  · Sleepy or hard to wake up. · Confused. · Not breathing normally. Make sure your family and friends know about these signs of an overdose. If someone appears to have overdosed, call 911. A drug overdose is an emergency. How do you use it? If you overdose, you may not be able to give yourself the medicine. So it's very important to teach friends and family how and when to give it to you. Rescue kits come with instructions. There are two ways to give the medicine. Many rescue kits can be used for either method. The methods are:  · Injection with needle and syringe. ¨ Training may be needed in order to use this method correctly. ¨ Some rescue kits come with automatic injectors that don't require special training. ¨ The medicine can be injected through clothing. · Nasal spray. ¨ Many rescue kits come with a nasal adapter. It attaches to the syringe and turns the medicine into a mist.  ¨ The mist is sprayed into the nose of a person who has overdosed. The person needs to be lying down when the mist is sprayed. Overdose symptoms may return a few minutes after the first dose from the rescue kit. If that happens, a second dose is needed. Rescue kits come with two doses for that reason. Keep your rescue kit with you always. You never know when you might need it. If you think you or someone else may have overdosed but you're not sure, use the kit anyway. Aside from going through withdrawal, which may be uncomfortable, there is no downside to using this medicine. Always go to the emergency room after using naloxone.  Doctors will want to make sure the overdose has been reversed. Follow-up care is a key part of your treatment and safety. Be sure to make and go to all appointments, and call your doctor if you are having problems. It's also a good idea to know your test results and keep a list of the medicines you take. Where can you learn more? Go to http://sridhar-neftali.info/. Enter O718 in the search box to learn more about \"Learning About Naloxone for Opioid Overdose. \"  Current as of: November 3, 2016  Content Version: 11.4  © 9868-6619 [employer]. Care instructions adapted under license by [employer] (which disclaims liability or warranty for this information). If you have questions about a medical condition or this instruction, always ask your healthcare professional. [address] any warranty or liability for your use of this information.       Patient armband removed and shredded

## 2018-04-10 NOTE — PROGRESS NOTES
Neurosurgery Progress Note; post op day 15  S: doing reasonably well all parameters considered will discharge home today  Patient will see Dr. Selena Rayo of pain management this week  Medications; neurontin 800 tid, cymbalta 60 mg bid dilaudid 8 mg tid flexeril roxicodone 30 mg every 4 hours narcan  Wound looks good healing well  Function is 5.5 has burning in the right L3/L4 distriubution   No headache  Afebrile and stable vital signs. A; ready to discharge  P; dc today, will follow up with pain md in several days.  Paper work completed

## 2018-04-11 ENCOUNTER — HOME HEALTH ADMISSION (OUTPATIENT)
Dept: HOME HEALTH SERVICES | Facility: HOME HEALTH | Age: 58
End: 2018-04-11

## 2018-04-12 ENCOUNTER — HOME CARE VISIT (OUTPATIENT)
Dept: HOME HEALTH SERVICES | Facility: HOME HEALTH | Age: 58
End: 2018-04-12

## 2018-04-14 ENCOUNTER — HOME CARE VISIT (OUTPATIENT)
Dept: HOME HEALTH SERVICES | Facility: HOME HEALTH | Age: 58
End: 2018-04-14

## 2018-04-16 ENCOUNTER — HOME CARE VISIT (OUTPATIENT)
Dept: HOME HEALTH SERVICES | Facility: HOME HEALTH | Age: 58
End: 2018-04-16

## 2018-04-17 ENCOUNTER — HOME CARE VISIT (OUTPATIENT)
Dept: HOME HEALTH SERVICES | Facility: HOME HEALTH | Age: 58
End: 2018-04-17

## 2018-11-14 ENCOUNTER — HOSPITAL ENCOUNTER (OUTPATIENT)
Dept: CT IMAGING | Age: 58
Discharge: HOME | End: 2018-11-14
Attending: NEUROLOGICAL SURGERY
Payer: MEDICARE

## 2018-11-14 ENCOUNTER — HOSPITAL ENCOUNTER (OUTPATIENT)
Dept: GENERAL RADIOLOGY | Age: 58
Discharge: HOME | End: 2018-11-14
Attending: NEUROLOGICAL SURGERY | Admitting: RADIOLOGY
Payer: MEDICARE

## 2018-11-14 VITALS
WEIGHT: 131 LBS | OXYGEN SATURATION: 97 % | SYSTOLIC BLOOD PRESSURE: 120 MMHG | TEMPERATURE: 97.3 F | HEIGHT: 66 IN | DIASTOLIC BLOOD PRESSURE: 65 MMHG | BODY MASS INDEX: 21.05 KG/M2 | RESPIRATION RATE: 16 BRPM

## 2018-11-14 DIAGNOSIS — M54.14 THORACIC RADICULOPATHY: ICD-10-CM

## 2018-11-14 DIAGNOSIS — M54.16 LUMBAR RADICULOPATHY: ICD-10-CM

## 2018-11-14 PROCEDURE — 74011250636 HC RX REV CODE- 250/636: Performed by: NEUROLOGICAL SURGERY

## 2018-11-14 PROCEDURE — 74011636320 HC RX REV CODE- 636/320: Performed by: NEUROLOGICAL SURGERY

## 2018-11-14 PROCEDURE — 62305 MYELOGRAPHY LUMBAR INJECTION: CPT

## 2018-11-14 PROCEDURE — 72132 CT LUMBAR SPINE W/DYE: CPT

## 2018-11-14 PROCEDURE — 72129 CT CHEST SPINE W/DYE: CPT

## 2018-11-14 RX ORDER — HYDROCODONE BITARTRATE AND ACETAMINOPHEN 5; 325 MG/1; MG/1
1 TABLET ORAL
Status: DISCONTINUED | OUTPATIENT
Start: 2018-11-14 | End: 2018-11-14 | Stop reason: HOSPADM

## 2018-11-14 RX ORDER — LIDOCAINE HYDROCHLORIDE 10 MG/ML
10 INJECTION, SOLUTION EPIDURAL; INFILTRATION; INTRACAUDAL; PERINEURAL
Status: COMPLETED | OUTPATIENT
Start: 2018-11-14 | End: 2018-11-14

## 2018-11-14 RX ORDER — ACETAMINOPHEN 325 MG/1
650 TABLET ORAL
Status: DISCONTINUED | OUTPATIENT
Start: 2018-11-14 | End: 2018-11-14 | Stop reason: HOSPADM

## 2018-11-14 RX ORDER — SODIUM CHLORIDE 9 MG/ML
3 INJECTION INTRAMUSCULAR; INTRAVENOUS; SUBCUTANEOUS
Status: DISCONTINUED | OUTPATIENT
Start: 2018-11-14 | End: 2018-11-14

## 2018-11-14 RX ADMIN — IOPAMIDOL 20 ML: 408 INJECTION, SOLUTION INTRATHECAL at 11:00

## 2018-11-14 RX ADMIN — LIDOCAINE HYDROCHLORIDE 5 ML: 10 INJECTION, SOLUTION EPIDURAL; INFILTRATION; INTRACAUDAL; PERINEURAL at 10:55

## 2018-11-14 NOTE — DISCHARGE INSTRUCTIONS
Myelogram: About This Test  What is it? A myelogram uses X-rays and a special dye to make pictures of bones and nerves of the spine (spinal canal). The spinal canal holds the spinal cord, the spinal nerve roots, and the fluid-filled space between the bones in your spine. Why is this test done? A myelogram is done to check for:  · The cause of arm or leg numbness, weakness, or pain. · Narrowing of the spinal canal (spinal stenosis). · A tumor or infection causing problems with the spinal cord or nerve roots. · A spinal disc that has ruptured (herniated disc). · Inflammation of the membrane that covers the brain and spinal cord. · Problems with the blood vessels to the spine. How can you prepare for the test?  Your doctor will tell you if you need to change how much you eat and drink before the myelogram. You may be asked to increase the amount of water you drink before the test. Follow the instructions your doctor gives you about eating and drinking. Before a myelogram, tell your doctor if:  · You are allergic to any medicines, contrast material, or iodine dye. · You have had bleeding problems, or you take a blood thinner, such as aspirin, clopidogrel (Plavix), or warfarin (Coumadin), or you take over-the-counter medicines, such as ibuprofen (Advil, Motrin) or naproxen (Aleve). Your doctor will tell you when you should stop taking these medicines several days before your procedure. Make sure that you understand exactly what he or she wants you to do. · You have had kidney problems. · You have diabetes, especially if you take metformin (Glucophage, for example). · You are or might be pregnant. Ask someone to take you home and stay with you after the test.  What happens during the test?  The dye is put into your spinal canal with a thin needle. This is called a lumbar puncture. The dye moves through the space so the nerve roots and spinal cord can be seen more clearly.  After the dye is put in, you will lie still while the X-ray pictures are taken. How does it feel? You will feel a quick sting from a small needle that has medicine to numb the skin on your back. You will also feel some pressure as the long, thin spinal needle is put into your spinal canal. You may feel a quick sharp pain down your buttock or leg when the needle is moved in your spine. The dye may make you feel warm and flushed and have a metallic taste in your mouth. What else should you know about the test?  In rare cases, the hole made by the needle in the sac around the spine does not close normally. This can allow spinal fluid to leak out. This leak may need to be repaired through a procedure called an epidural blood patch. To do the patch, your doctor injects some of your own blood to cover the hole. How long does the test take? · A myelogram usually takes 30 minutes to 1 hour. What happens after the test?  · You will probably be able to go home 30 minutes to 2 hours after the test.  · You may need to lie in bed with your head raised for 4 to 24 hours after the test. To prevent seizures, which are a rare side effect, do not bend over or lie down with your head lower than your body. Keeping your head higher than your body after a myelogram also may help prevent or reduce other side effects, such as headache, nausea, or vomiting. · Avoid strenuous activity, such as running or heavy lifting, for at least 1 day after your myelogram.  · Drink plenty of water after the myelogram. Your doctor will give you instructions on taking your regular medicines. When should you call for help? Call 911 anytime you think you may need emergency care. For example, call if:  · You have a seizure. Call your doctor now or seek immediate medical care if:  · You have any increase in pain, weakness, or numbness in your legs. · You have a severe headache or stiff neck, or your eyes become very sensitive to light.   · You have a headache that lasts longer than 24 hours.  · You have problems urinating or having a bowel movement. · You have a fever. Follow-up care is a key part of your treatment and safety. Be sure to make and go to all appointments, and call your doctor if you are having problems. It's also a good idea to keep a list of the medicines you take. Ask your doctor when you can expect to have your test results. Where can you learn more? Go to http://sridhar-neftali.info/. Enter U585 in the search box to learn more about \"Myelogram: About This Test.\"  Current as of: June 26, 2018  Content Version: 11.8  © 8418-7913 [employer]. Care instructions adapted under license by [employer] (which disclaims liability or warranty for this information). If you have questions about a medical condition or this instruction, always ask your healthcare professional. [place] [address] any warranty or liability for your use of this information. DISCHARGE SUMMARY from Nurse    PATIENT INSTRUCTIONS:    After general anesthesia or intravenous sedation, for 24 hours or while taking prescription Narcotics:  · Limit your activities  · Do not drive and operate hazardous machinery  · Do not make important personal or business decisions  · Do  not drink alcoholic beverages  · If you have not urinated within 8 hours after discharge, please contact your surgeon on call.     Report the following to your surgeon:  · Excessive pain, swelling, redness or odor of or around the surgical area  · Temperature over 100.5  · Nausea and vomiting lasting longer than 4 hours or if unable to take medications  · Any signs of decreased circulation or nerve impairment to extremity: change in color, persistent  numbness, tingling, coldness or increase pain  · Any questions    What to do at Home:  Recommended activity: Activity as tolerated and no driving for today    These are general instructions for a healthy lifestyle:    No smoking/ No tobacco products/ Avoid exposure to second hand smoke  Surgeon General's Warning:  Quitting smoking now greatly reduces serious risk to your health. Obesity, smoking, and sedentary lifestyle greatly increases your risk for illness    A healthy diet, regular physical exercise & weight monitoring are important for maintaining a healthy lifestyle    You may be retaining fluid if you have a history of heart failure or if you experience any of the following symptoms:  Weight gain of 3 pounds or more overnight or 5 pounds in a week, increased swelling in our hands or feet or shortness of breath while lying flat in bed. Please call your doctor as soon as you notice any of these symptoms; do not wait until your next office visit. Recognize signs and symptoms of STROKE:    F-face looks uneven    A-arms unable to move or move unevenly    S-speech slurred or non-existent    T-time-call 911 as soon as signs and symptoms begin-DO NOT go       Back to bed or wait to see if you get better-TIME IS BRAIN. Warning Signs of HEART ATTACK     Call 911 if you have these symptoms:   Chest discomfort. Most heart attacks involve discomfort in the center of the chest that lasts more than a few minutes, or that goes away and comes back. It can feel like uncomfortable pressure, squeezing, fullness, or pain.  Discomfort in other areas of the upper body. Symptoms can include pain or discomfort in one or both arms, the back, neck, jaw, or stomach.  Shortness of breath with or without chest discomfort.  Other signs may include breaking out in a cold sweat, nausea, or lightheadedness. Don't wait more than five minutes to call 911 - MINUTES MATTER! Fast action can save your life. Calling 911 is almost always the fastest way to get lifesaving treatment. Emergency Medical Services staff can begin treatment when they arrive -- up to an hour sooner than if someone gets to the hospital by car.      The discharge information has been reviewed with the patient. The patient verbalized understanding. Discharge medications reviewed with the patient and appropriate educational materials and side effects teaching were provided. Patient armband removed and given to patient to take home. Patient was informed of the privacy risks if armband lost or stolen.

## 2018-11-14 NOTE — PROGRESS NOTES
Vascular & Interventional Radiology Brief Procedure Note    Interventional Radiologist: Gayatri Mondragon MD    Assistants: None    Pre-operative Diagnosis:  Thoracic and lumbar radiculopathy    Post-operative Diagnosis: Same as pre-op dx    Procedure(s) Performed:  Thoracic and lumbar myelogram via lumbar puncture    Anesthesia:  Local    Findings:  Clear csf    Complications: None    Estimated Blood Loss:  minimal    Tubes and Drains: None    Specimens: None    Condition: Good    Disposition:  To CT    Plan: discharge      Gayatri Mondragon MD  [facility]  Vascular & Interventional Radiology  11/14/2018

## 2021-11-28 NOTE — PROGRESS NOTES
Problem: Falls - Risk of  Goal: *Absence of Falls  Document Ivania Fall Risk and appropriate interventions in the flowsheet.    Outcome: Progressing Towards Goal  Fall Risk Interventions:  Mobility Interventions: Bed/chair exit alarm         Medication Interventions: Bed/chair exit alarm    Elimination Interventions: Bed/chair exit alarm, Call light in reach No

## 2023-09-27 ENCOUNTER — OFFICE VISIT (OUTPATIENT)
Facility: CLINIC | Age: 63
End: 2023-09-27
Payer: MEDICARE

## 2023-09-27 VITALS
DIASTOLIC BLOOD PRESSURE: 84 MMHG | SYSTOLIC BLOOD PRESSURE: 112 MMHG | WEIGHT: 151.6 LBS | OXYGEN SATURATION: 98 % | RESPIRATION RATE: 16 BRPM | HEART RATE: 71 BPM | BODY MASS INDEX: 26.86 KG/M2 | TEMPERATURE: 98.2 F | HEIGHT: 63 IN

## 2023-09-27 DIAGNOSIS — G89.29 CHRONIC PAIN OF BOTH KNEES: ICD-10-CM

## 2023-09-27 DIAGNOSIS — M25.561 CHRONIC PAIN OF BOTH KNEES: ICD-10-CM

## 2023-09-27 DIAGNOSIS — M25.551 BILATERAL HIP PAIN: ICD-10-CM

## 2023-09-27 DIAGNOSIS — G89.29 CHRONIC PAIN OF BOTH SHOULDERS: ICD-10-CM

## 2023-09-27 DIAGNOSIS — M25.50 POLYARTHRALGIA: ICD-10-CM

## 2023-09-27 DIAGNOSIS — M25.511 CHRONIC PAIN OF BOTH SHOULDERS: ICD-10-CM

## 2023-09-27 DIAGNOSIS — Z12.11 ENCOUNTER FOR SCREENING COLONOSCOPY: ICD-10-CM

## 2023-09-27 DIAGNOSIS — Z12.31 ENCOUNTER FOR SCREENING MAMMOGRAM FOR BREAST CANCER: Primary | ICD-10-CM

## 2023-09-27 DIAGNOSIS — M25.552 BILATERAL HIP PAIN: ICD-10-CM

## 2023-09-27 DIAGNOSIS — M25.562 CHRONIC PAIN OF BOTH KNEES: ICD-10-CM

## 2023-09-27 DIAGNOSIS — M25.512 CHRONIC PAIN OF BOTH SHOULDERS: ICD-10-CM

## 2023-09-27 PROBLEM — R19.7 DIARRHEA: Status: ACTIVE | Noted: 2023-09-27

## 2023-09-27 PROBLEM — F32.A DEPRESSIVE DISORDER: Status: ACTIVE | Noted: 2023-06-15

## 2023-09-27 PROBLEM — N91.2 AMENORRHEA: Status: ACTIVE | Noted: 2023-09-27

## 2023-09-27 PROBLEM — K20.90 ESOPHAGITIS: Status: ACTIVE | Noted: 2023-09-27

## 2023-09-27 PROBLEM — K22.6 MALLORY-WEISS TEAR: Status: ACTIVE | Noted: 2023-06-15

## 2023-09-27 PROBLEM — I49.9 CARDIAC ARRHYTHMIA: Status: ACTIVE | Noted: 2023-06-15

## 2023-09-27 PROBLEM — K56.609 SMALL BOWEL OBSTRUCTION (HCC): Status: ACTIVE | Noted: 2020-02-18

## 2023-09-27 PROBLEM — R00.2 PALPITATIONS: Status: ACTIVE | Noted: 2023-09-27

## 2023-09-27 PROBLEM — N95.1 MENOPAUSAL SYMPTOM: Status: ACTIVE | Noted: 2023-09-27

## 2023-09-27 PROBLEM — D64.9 ANEMIA: Status: ACTIVE | Noted: 2023-09-27

## 2023-09-27 PROCEDURE — 1036F TOBACCO NON-USER: CPT | Performed by: STUDENT IN AN ORGANIZED HEALTH CARE EDUCATION/TRAINING PROGRAM

## 2023-09-27 PROCEDURE — 3074F SYST BP LT 130 MM HG: CPT | Performed by: STUDENT IN AN ORGANIZED HEALTH CARE EDUCATION/TRAINING PROGRAM

## 2023-09-27 PROCEDURE — 99204 OFFICE O/P NEW MOD 45 MIN: CPT | Performed by: STUDENT IN AN ORGANIZED HEALTH CARE EDUCATION/TRAINING PROGRAM

## 2023-09-27 PROCEDURE — 3017F COLORECTAL CA SCREEN DOC REV: CPT | Performed by: STUDENT IN AN ORGANIZED HEALTH CARE EDUCATION/TRAINING PROGRAM

## 2023-09-27 PROCEDURE — 3078F DIAST BP <80 MM HG: CPT | Performed by: STUDENT IN AN ORGANIZED HEALTH CARE EDUCATION/TRAINING PROGRAM

## 2023-09-27 PROCEDURE — G8419 CALC BMI OUT NRM PARAM NOF/U: HCPCS | Performed by: STUDENT IN AN ORGANIZED HEALTH CARE EDUCATION/TRAINING PROGRAM

## 2023-09-27 PROCEDURE — G8427 DOCREV CUR MEDS BY ELIG CLIN: HCPCS | Performed by: STUDENT IN AN ORGANIZED HEALTH CARE EDUCATION/TRAINING PROGRAM

## 2023-09-27 RX ORDER — LOSARTAN POTASSIUM 50 MG/1
50 TABLET ORAL DAILY
COMMUNITY
Start: 2023-07-27

## 2023-09-27 RX ORDER — METOPROLOL SUCCINATE 25 MG/1
25 TABLET, EXTENDED RELEASE ORAL DAILY
COMMUNITY
Start: 2023-09-23

## 2023-09-27 SDOH — ECONOMIC STABILITY: HOUSING INSECURITY
IN THE LAST 12 MONTHS, WAS THERE A TIME WHEN YOU DID NOT HAVE A STEADY PLACE TO SLEEP OR SLEPT IN A SHELTER (INCLUDING NOW)?: NO

## 2023-09-27 SDOH — ECONOMIC STABILITY: INCOME INSECURITY: HOW HARD IS IT FOR YOU TO PAY FOR THE VERY BASICS LIKE FOOD, HOUSING, MEDICAL CARE, AND HEATING?: NOT HARD AT ALL

## 2023-09-27 SDOH — ECONOMIC STABILITY: FOOD INSECURITY: WITHIN THE PAST 12 MONTHS, THE FOOD YOU BOUGHT JUST DIDN'T LAST AND YOU DIDN'T HAVE MONEY TO GET MORE.: NEVER TRUE

## 2023-09-27 SDOH — ECONOMIC STABILITY: FOOD INSECURITY: WITHIN THE PAST 12 MONTHS, YOU WORRIED THAT YOUR FOOD WOULD RUN OUT BEFORE YOU GOT MONEY TO BUY MORE.: NEVER TRUE

## 2023-09-27 ASSESSMENT — PATIENT HEALTH QUESTIONNAIRE - PHQ9
10. IF YOU CHECKED OFF ANY PROBLEMS, HOW DIFFICULT HAVE THESE PROBLEMS MADE IT FOR YOU TO DO YOUR WORK, TAKE CARE OF THINGS AT HOME, OR GET ALONG WITH OTHER PEOPLE: 0
1. LITTLE INTEREST OR PLEASURE IN DOING THINGS: 0
2. FEELING DOWN, DEPRESSED OR HOPELESS: 0
SUM OF ALL RESPONSES TO PHQ QUESTIONS 1-9: 3
4. FEELING TIRED OR HAVING LITTLE ENERGY: 1
9. THOUGHTS THAT YOU WOULD BE BETTER OFF DEAD, OR OF HURTING YOURSELF: 0
7. TROUBLE CONCENTRATING ON THINGS, SUCH AS READING THE NEWSPAPER OR WATCHING TELEVISION: 0
5. POOR APPETITE OR OVEREATING: 0
SUM OF ALL RESPONSES TO PHQ QUESTIONS 1-9: 3
6. FEELING BAD ABOUT YOURSELF - OR THAT YOU ARE A FAILURE OR HAVE LET YOURSELF OR YOUR FAMILY DOWN: 0
3. TROUBLE FALLING OR STAYING ASLEEP: 2
SUM OF ALL RESPONSES TO PHQ QUESTIONS 1-9: 3
8. MOVING OR SPEAKING SO SLOWLY THAT OTHER PEOPLE COULD HAVE NOTICED. OR THE OPPOSITE, BEING SO FIGETY OR RESTLESS THAT YOU HAVE BEEN MOVING AROUND A LOT MORE THAN USUAL: 0
SUM OF ALL RESPONSES TO PHQ9 QUESTIONS 1 & 2: 0
SUM OF ALL RESPONSES TO PHQ QUESTIONS 1-9: 3

## 2023-09-27 NOTE — PROGRESS NOTES
Chief Complaint   Patient presents with    Establish Care     Pt needing in a referral for mammo and colo. 1. \"Have you been to the ER, urgent care clinic since your last visit? Hospitalized since your last visit? \" No    2. \"Have you seen or consulted any other health care providers outside of the [address] [place] [address] since your last visit? \" No     3. For patients aged 43-73: Has the patient had a colonoscopy / FIT/ Cologuard? No      If the patient is female:    4. For patients aged 43-66: Has the patient had a mammogram within the past 2 years? No      5. For patients aged 21-65: Has the patient had a pap smear?  No

## 2023-09-27 NOTE — PROGRESS NOTES
Prasanna Pendleton (: 1960) is a 58 y.o. female, new patient, here for evaluation of the following chief complaint(s):  Establish Care (Pt needing in a referral for mammo and colo.)       Assessment/Plan:  1. Encounter for screening mammogram for breast cancer  Asymptomatic screening.   - JORDAN JANAE DIGITAL SCREEN BILATERAL; Future    2. Encounter for screening colonoscopy  Need of colonoscopy for colon cancer screening as well as for her recurrent diverticulitis. Discussed dietary approaches to try and decrease risk of recurrent attacks. Their efficacy is limited but may be beneficial.   - Amb External Referral To Gastroenterology    3. Polyarthralgia  Some discomfort in the all major joints. Would like to start with imaging given chronicity. If otherwise normal, will start with lab workup for potential inflammatory/autoimmune conditions that may be contributing. Can take otc analgesics prn for discomfort. 4. Chronic pain of both shoulders  As above  - XR SHOULDER RIGHT (MIN 2 VIEWS); Future  - XR SHOULDER LEFT (MIN 2 VIEWS); Future    5. Chronic pain of both knees  As above  - XR KNEE RIGHT (3 VIEWS); Future  - XR KNEE LEFT (3 VIEWS); Future    6. Bilateral hip pain  As above  - XR HIP LEFT (2-3 VIEWS); Future  - XR HIP RIGHT (2-3 VIEWS); Future      Return in about 6 weeks (around 2023) for joint pains. Subjective/Objective:  HPI  Establish Care- Retired, shared [address] with  (pt ran office). Palpitations- Been on metoprolol for 30yrs. Stable symptoms    HTN- Losartan 50mg for a few months. Had gotten up to 150/110s. Originally diagnosed during hospitalizations for diverticulitis and headaches. Unclear exactly how many BP's were checked when she was not in pain or acutely ill.   - Denies lightheadedness, dizziness on current regimen. Recurrent diverticulitis- Several ED visits. No recent follow up with GI. No recent colonoscopy.        Physical Exam  Blood pressure

## 2023-09-29 ENCOUNTER — HOSPITAL ENCOUNTER (OUTPATIENT)
Facility: HOSPITAL | Age: 63
End: 2023-09-29
Attending: STUDENT IN AN ORGANIZED HEALTH CARE EDUCATION/TRAINING PROGRAM
Payer: MEDICARE

## 2023-09-29 VITALS — HEIGHT: 63 IN | WEIGHT: 151 LBS | BODY MASS INDEX: 26.75 KG/M2

## 2023-09-29 DIAGNOSIS — Z12.31 ENCOUNTER FOR SCREENING MAMMOGRAM FOR BREAST CANCER: ICD-10-CM

## 2023-09-29 PROCEDURE — 77063 BREAST TOMOSYNTHESIS BI: CPT

## 2023-10-06 ENCOUNTER — TELEPHONE (OUTPATIENT)
Facility: CLINIC | Age: 63
End: 2023-10-06

## 2023-10-06 NOTE — TELEPHONE ENCOUNTER
Patient called requesting a call back regarding her mammo results. Please review and advise. [phone]

## 2023-10-11 ENCOUNTER — OFFICE VISIT (OUTPATIENT)
Facility: CLINIC | Age: 63
End: 2023-10-11
Payer: MEDICARE

## 2023-10-11 VITALS — TEMPERATURE: 98 F

## 2023-10-11 DIAGNOSIS — J40 BRONCHITIS: Primary | ICD-10-CM

## 2023-10-11 DIAGNOSIS — R68.89 FLU-LIKE SYMPTOMS: ICD-10-CM

## 2023-10-11 PROCEDURE — 99213 OFFICE O/P EST LOW 20 MIN: CPT | Performed by: STUDENT IN AN ORGANIZED HEALTH CARE EDUCATION/TRAINING PROGRAM

## 2023-10-11 PROCEDURE — G8484 FLU IMMUNIZE NO ADMIN: HCPCS | Performed by: STUDENT IN AN ORGANIZED HEALTH CARE EDUCATION/TRAINING PROGRAM

## 2023-10-11 PROCEDURE — G8419 CALC BMI OUT NRM PARAM NOF/U: HCPCS | Performed by: STUDENT IN AN ORGANIZED HEALTH CARE EDUCATION/TRAINING PROGRAM

## 2023-10-11 PROCEDURE — 3017F COLORECTAL CA SCREEN DOC REV: CPT | Performed by: STUDENT IN AN ORGANIZED HEALTH CARE EDUCATION/TRAINING PROGRAM

## 2023-10-11 PROCEDURE — 1036F TOBACCO NON-USER: CPT | Performed by: STUDENT IN AN ORGANIZED HEALTH CARE EDUCATION/TRAINING PROGRAM

## 2023-10-11 PROCEDURE — G8428 CUR MEDS NOT DOCUMENT: HCPCS | Performed by: STUDENT IN AN ORGANIZED HEALTH CARE EDUCATION/TRAINING PROGRAM

## 2024-01-11 ENCOUNTER — HOSPITAL ENCOUNTER (OUTPATIENT)
Facility: HOSPITAL | Age: 64
Setting detail: SPECIMEN
Discharge: HOME | End: 2024-01-11
Payer: MEDICARE

## 2024-01-11 ENCOUNTER — OFFICE VISIT (OUTPATIENT)
Facility: CLINIC | Age: 64
End: 2024-01-11
Payer: MEDICARE

## 2024-01-11 VITALS
HEART RATE: 71 BPM | BODY MASS INDEX: 26.75 KG/M2 | HEIGHT: 63 IN | SYSTOLIC BLOOD PRESSURE: 148 MMHG | DIASTOLIC BLOOD PRESSURE: 82 MMHG | TEMPERATURE: 98.1 F | OXYGEN SATURATION: 98 % | RESPIRATION RATE: 16 BRPM | WEIGHT: 151 LBS

## 2024-01-11 DIAGNOSIS — E66.3 OVERWEIGHT (BMI 25.0-29.9): ICD-10-CM

## 2024-01-11 DIAGNOSIS — Z11.59 ENCOUNTER FOR HEPATITIS C SCREENING TEST FOR LOW RISK PATIENT: ICD-10-CM

## 2024-01-11 DIAGNOSIS — F17.290 OTHER TOBACCO PRODUCT NICOTINE DEPENDENCE, UNCOMPLICATED: ICD-10-CM

## 2024-01-11 DIAGNOSIS — Z11.4 SCREENING FOR HIV WITHOUT PRESENCE OF RISK FACTORS: ICD-10-CM

## 2024-01-11 DIAGNOSIS — Z87.898 HISTORY OF PALPITATIONS: ICD-10-CM

## 2024-01-11 DIAGNOSIS — Z00.00 MEDICARE ANNUAL WELLNESS VISIT, SUBSEQUENT: Primary | ICD-10-CM

## 2024-01-11 LAB
ALBUMIN SERPL-MCNC: 4.1 G/DL (ref 3.4–5)
ALBUMIN/GLOB SERPL: 1.6 (ref 0.8–1.7)
ALP SERPL-CCNC: 111 U/L (ref 45–117)
ALT SERPL-CCNC: 22 U/L (ref 13–56)
ANION GAP SERPL CALC-SCNC: 4 MMOL/L (ref 3–18)
AST SERPL-CCNC: 18 U/L (ref 10–38)
BILIRUB SERPL-MCNC: 0.3 MG/DL (ref 0.2–1)
BUN SERPL-MCNC: 23 MG/DL (ref 7–18)
BUN/CREAT SERPL: 29 (ref 12–20)
CALCIUM SERPL-MCNC: 10.5 MG/DL (ref 8.5–10.1)
CHLORIDE SERPL-SCNC: 106 MMOL/L (ref 100–111)
CHOLEST SERPL-MCNC: 197 MG/DL
CO2 SERPL-SCNC: 30 MMOL/L (ref 21–32)
CREAT SERPL-MCNC: 0.79 MG/DL (ref 0.6–1.3)
ERYTHROCYTE [DISTWIDTH] IN BLOOD BY AUTOMATED COUNT: 13.2 % (ref 11.6–14.5)
GLOBULIN SER CALC-MCNC: 2.6 G/DL (ref 2–4)
GLUCOSE SERPL-MCNC: 88 MG/DL (ref 74–99)
HCT VFR BLD AUTO: 40.8 % (ref 35–45)
HDLC SERPL-MCNC: 68 MG/DL (ref 40–60)
HDLC SERPL: 2.9 (ref 0–5)
HGB BLD-MCNC: 13 G/DL (ref 12–16)
LDLC SERPL CALC-MCNC: 109.2 MG/DL (ref 0–100)
LIPID PANEL: ABNORMAL
MCH RBC QN AUTO: 31.9 PG (ref 24–34)
MCHC RBC AUTO-ENTMCNC: 31.9 G/DL (ref 31–37)
MCV RBC AUTO: 100 FL (ref 78–100)
NRBC # BLD: 0 K/UL (ref 0–0.01)
NRBC BLD-RTO: 0 PER 100 WBC
PLATELET # BLD AUTO: 283 K/UL (ref 135–420)
PMV BLD AUTO: 9.1 FL (ref 9.2–11.8)
POTASSIUM SERPL-SCNC: 4.1 MMOL/L (ref 3.5–5.5)
PROT SERPL-MCNC: 6.7 G/DL (ref 6.4–8.2)
RBC # BLD AUTO: 4.08 M/UL (ref 4.2–5.3)
SODIUM SERPL-SCNC: 140 MMOL/L (ref 136–145)
TRIGL SERPL-MCNC: 99 MG/DL
VLDLC SERPL CALC-MCNC: 19.8 MG/DL
WBC # BLD AUTO: 6.1 K/UL (ref 4.6–13.2)

## 2024-01-11 PROCEDURE — 80061 LIPID PANEL: CPT

## 2024-01-11 PROCEDURE — 3079F DIAST BP 80-89 MM HG: CPT | Performed by: STUDENT IN AN ORGANIZED HEALTH CARE EDUCATION/TRAINING PROGRAM

## 2024-01-11 PROCEDURE — G0439 PPPS, SUBSEQ VISIT: HCPCS | Performed by: STUDENT IN AN ORGANIZED HEALTH CARE EDUCATION/TRAINING PROGRAM

## 2024-01-11 PROCEDURE — 99406 BEHAV CHNG SMOKING 3-10 MIN: CPT | Performed by: STUDENT IN AN ORGANIZED HEALTH CARE EDUCATION/TRAINING PROGRAM

## 2024-01-11 PROCEDURE — 85027 COMPLETE CBC AUTOMATED: CPT

## 2024-01-11 PROCEDURE — 3077F SYST BP >= 140 MM HG: CPT | Performed by: STUDENT IN AN ORGANIZED HEALTH CARE EDUCATION/TRAINING PROGRAM

## 2024-01-11 PROCEDURE — 87389 HIV-1 AG W/HIV-1&-2 AB AG IA: CPT

## 2024-01-11 PROCEDURE — 86803 HEPATITIS C AB TEST: CPT

## 2024-01-11 PROCEDURE — G8484 FLU IMMUNIZE NO ADMIN: HCPCS | Performed by: STUDENT IN AN ORGANIZED HEALTH CARE EDUCATION/TRAINING PROGRAM

## 2024-01-11 PROCEDURE — 3017F COLORECTAL CA SCREEN DOC REV: CPT | Performed by: STUDENT IN AN ORGANIZED HEALTH CARE EDUCATION/TRAINING PROGRAM

## 2024-01-11 PROCEDURE — 80053 COMPREHEN METABOLIC PANEL: CPT

## 2024-01-11 ASSESSMENT — PATIENT HEALTH QUESTIONNAIRE - PHQ9
SUM OF ALL RESPONSES TO PHQ9 QUESTIONS 1 & 2: 0
SUM OF ALL RESPONSES TO PHQ QUESTIONS 1-9: 0
SUM OF ALL RESPONSES TO PHQ QUESTIONS 1-9: 0
6. FEELING BAD ABOUT YOURSELF - OR THAT YOU ARE A FAILURE OR HAVE LET YOURSELF OR YOUR FAMILY DOWN: 0
SUM OF ALL RESPONSES TO PHQ QUESTIONS 1-9: 0
3. TROUBLE FALLING OR STAYING ASLEEP: 0
SUM OF ALL RESPONSES TO PHQ QUESTIONS 1-9: 0
SUM OF ALL RESPONSES TO PHQ9 QUESTIONS 1 & 2: 0
10. IF YOU CHECKED OFF ANY PROBLEMS, HOW DIFFICULT HAVE THESE PROBLEMS MADE IT FOR YOU TO DO YOUR WORK, TAKE CARE OF THINGS AT HOME, OR GET ALONG WITH OTHER PEOPLE: 0
SUM OF ALL RESPONSES TO PHQ QUESTIONS 1-9: 0
SUM OF ALL RESPONSES TO PHQ QUESTIONS 1-9: 0
2. FEELING DOWN, DEPRESSED OR HOPELESS: 0
1. LITTLE INTEREST OR PLEASURE IN DOING THINGS: 0
5. POOR APPETITE OR OVEREATING: 0
SUM OF ALL RESPONSES TO PHQ QUESTIONS 1-9: 0
9. THOUGHTS THAT YOU WOULD BE BETTER OFF DEAD, OR OF HURTING YOURSELF: 0
7. TROUBLE CONCENTRATING ON THINGS, SUCH AS READING THE NEWSPAPER OR WATCHING TELEVISION: 0
8. MOVING OR SPEAKING SO SLOWLY THAT OTHER PEOPLE COULD HAVE NOTICED. OR THE OPPOSITE, BEING SO FIGETY OR RESTLESS THAT YOU HAVE BEEN MOVING AROUND A LOT MORE THAN USUAL: 0
1. LITTLE INTEREST OR PLEASURE IN DOING THINGS: 0
SUM OF ALL RESPONSES TO PHQ QUESTIONS 1-9: 0
4. FEELING TIRED OR HAVING LITTLE ENERGY: 0
2. FEELING DOWN, DEPRESSED OR HOPELESS: 0

## 2024-01-11 ASSESSMENT — LIFESTYLE VARIABLES
HOW OFTEN DO YOU HAVE A DRINK CONTAINING ALCOHOL: 2-3 TIMES A WEEK
HOW MANY STANDARD DRINKS CONTAINING ALCOHOL DO YOU HAVE ON A TYPICAL DAY: 1 OR 2

## 2024-01-11 NOTE — PATIENT INSTRUCTIONS
Activity: Your Everyday Guide\" from [employer]. Call 1-601.526.1751 or search [employer] online.  You need 8342-8112 mg of calcium and 9362-9736 IU of vitamin D per day. It is possible to meet your calcium requirement with diet alone, but a vitamin D supplement is usually necessary to meet this goal.  When exposed to the sun, use a sunscreen that protects against both UVA and UVB radiation with an SPF of 30 or greater. Reapply every 2 to 3 hours or after sweating, drying off with a towel, or swimming.  Always wear a seat belt when traveling in a car. Always wear a helmet when riding a bicycle or motorcycle.

## 2024-01-11 NOTE — PROGRESS NOTES
Medicare Annual Wellness Visit    Roya Marcial is here for Medicare AWV    Assessment & Plan   1. Medicare annual wellness visit, subsequent  Discussed age appropriate screenings and preventative care. Will get Shingrix at pharm. Recommend either return for pap or follow up with Gyn. Upcoming colo    2. Overweight (BMI 25.0-29.9)  Diet and lifestyle modifications for weight loss. Dietary principles from MyPlate.gov for a healthy, varied diet. Reduce caloric intake. Recommend cumulatively 150-minutes of moderate intensity exercise or 75-minutes of vigorous activity weekly.  - Lipid Panel; Future  - Comprehensive Metabolic Panel; Future  - CBC; Future    3. Other tobacco product nicotine dependence, uncomplicated  See below. Goal to quit vaping by bday. Will pursue NRT OTC.   - [place] TOBACCO USE CESSATION INTERMEDIATE 3-10 MINUTES    4. Screening for HIV without presence of risk factors  - Hepatitis C Ab, Rflx to Qt by PCR; Future    5. Encounter for hepatitis C screening test for low risk patient  - HIV 1/2 Ag/Ab, 4TH Generation,W Rflx Confirm; Future    6. History of palpitations  Lengthy history of metoprolol for treatment of this condition. No real trial off of it. No cardiac secondary reason she needs it. Can perform 1-2wk trial off of it. Monitor home BP's during this period    Recommendations for Preventive Services Due: see orders and patient instructions/AVS.  Recommended screening schedule for the next 5-10 years is provided to the patient in written form: see Patient Instructions/AVS.     Return in 4 weeks (on 2/8/2024) for for pap smear.     Subjective   The following acute and/or chronic problems were also addressed today:      GI- Some run around and had to reschedule colonoscopy due to provider cancelling. Scheduled for April     Dental work- Had some upper partial but issues with ulceration. Had extensive surgery/teeth pull. Upcoming implants.     HTN  - checks a few times a week at home. Reportedly

## 2024-01-12 LAB
HIV 1+2 AB+HIV1 P24 AG SERPL QL IA: NONREACTIVE
HIV 1/2 RESULT COMMENT: NORMAL

## 2024-01-13 LAB
HCV AB SERPL QL IA: NORMAL
HCV IGG SERPL QL IA: NON REACTIVE S/CO RATIO

## 2024-01-15 ENCOUNTER — TELEPHONE (OUTPATIENT)
Facility: CLINIC | Age: 64
End: 2024-01-15

## 2024-01-15 NOTE — TELEPHONE ENCOUNTER
Pt wanted to inform Dr. Longoria that coming of the metoprolol she started having really bad heart palpitations to the point where she could barely catch her breath. Pt states once she was able to get herself together that she called the pharmacist and was informed that she should not stop taking that medication all at once and to take one immediately. Ms. Marcial informed me that the pharmacist informed her that going off of metoprolol all at once can be fatal. Please review and advise.

## 2024-04-18 ENCOUNTER — OFFICE VISIT (OUTPATIENT)
Facility: CLINIC | Age: 64
End: 2024-04-18
Payer: MEDICARE

## 2024-04-18 VITALS
RESPIRATION RATE: 14 BRPM | BODY MASS INDEX: 26.57 KG/M2 | HEART RATE: 83 BPM | WEIGHT: 150 LBS | SYSTOLIC BLOOD PRESSURE: 140 MMHG | OXYGEN SATURATION: 95 % | TEMPERATURE: 98.1 F | DIASTOLIC BLOOD PRESSURE: 92 MMHG

## 2024-04-18 DIAGNOSIS — R50.9 COUGH WITH FEVER: ICD-10-CM

## 2024-04-18 DIAGNOSIS — I10 PRIMARY HYPERTENSION: ICD-10-CM

## 2024-04-18 DIAGNOSIS — R00.2 PALPITATIONS: ICD-10-CM

## 2024-04-18 DIAGNOSIS — R05.9 COUGH WITH FEVER: ICD-10-CM

## 2024-04-18 DIAGNOSIS — J06.9 UPPER RESPIRATORY TRACT INFECTION, UNSPECIFIED TYPE: Primary | ICD-10-CM

## 2024-04-18 LAB
EXP DATE SOLUTION: NORMAL
EXP DATE SWAB: NORMAL
EXPIRATION DATE: NORMAL
INFLUENZA A ANTIGEN, POC: NEGATIVE
INFLUENZA B ANTIGEN, POC: NEGATIVE
LOT NUMBER POC: NORMAL
LOT NUMBER SOLUTION: NORMAL
LOT NUMBER SWAB: NORMAL
SARS-COV-2 RNA, POC: NEGATIVE
VALID INTERNAL CONTROL, POC: YES

## 2024-04-18 PROCEDURE — 1123F ACP DISCUSS/DSCN MKR DOCD: CPT | Performed by: STUDENT IN AN ORGANIZED HEALTH CARE EDUCATION/TRAINING PROGRAM

## 2024-04-18 PROCEDURE — G8400 PT W/DXA NO RESULTS DOC: HCPCS | Performed by: STUDENT IN AN ORGANIZED HEALTH CARE EDUCATION/TRAINING PROGRAM

## 2024-04-18 PROCEDURE — G8419 CALC BMI OUT NRM PARAM NOF/U: HCPCS | Performed by: STUDENT IN AN ORGANIZED HEALTH CARE EDUCATION/TRAINING PROGRAM

## 2024-04-18 PROCEDURE — 1090F PRES/ABSN URINE INCON ASSESS: CPT | Performed by: STUDENT IN AN ORGANIZED HEALTH CARE EDUCATION/TRAINING PROGRAM

## 2024-04-18 PROCEDURE — 3077F SYST BP >= 140 MM HG: CPT | Performed by: STUDENT IN AN ORGANIZED HEALTH CARE EDUCATION/TRAINING PROGRAM

## 2024-04-18 PROCEDURE — 1036F TOBACCO NON-USER: CPT | Performed by: STUDENT IN AN ORGANIZED HEALTH CARE EDUCATION/TRAINING PROGRAM

## 2024-04-18 PROCEDURE — G8427 DOCREV CUR MEDS BY ELIG CLIN: HCPCS | Performed by: STUDENT IN AN ORGANIZED HEALTH CARE EDUCATION/TRAINING PROGRAM

## 2024-04-18 PROCEDURE — 99214 OFFICE O/P EST MOD 30 MIN: CPT | Performed by: STUDENT IN AN ORGANIZED HEALTH CARE EDUCATION/TRAINING PROGRAM

## 2024-04-18 PROCEDURE — 87502 INFLUENZA DNA AMP PROBE: CPT | Performed by: STUDENT IN AN ORGANIZED HEALTH CARE EDUCATION/TRAINING PROGRAM

## 2024-04-18 PROCEDURE — 87635 SARS-COV-2 COVID-19 AMP PRB: CPT | Performed by: STUDENT IN AN ORGANIZED HEALTH CARE EDUCATION/TRAINING PROGRAM

## 2024-04-18 PROCEDURE — 3017F COLORECTAL CA SCREEN DOC REV: CPT | Performed by: STUDENT IN AN ORGANIZED HEALTH CARE EDUCATION/TRAINING PROGRAM

## 2024-04-18 PROCEDURE — 3080F DIAST BP >= 90 MM HG: CPT | Performed by: STUDENT IN AN ORGANIZED HEALTH CARE EDUCATION/TRAINING PROGRAM

## 2024-04-18 RX ORDER — AMOXICILLIN AND CLAVULANATE POTASSIUM 875; 125 MG/1; MG/1
1 TABLET, FILM COATED ORAL 2 TIMES DAILY
Qty: 14 TABLET | Refills: 0 | Status: SHIPPED | OUTPATIENT
Start: 2024-04-18 | End: 2024-04-25

## 2024-04-18 RX ORDER — METOPROLOL SUCCINATE 25 MG/1
25 TABLET, EXTENDED RELEASE ORAL DAILY
Qty: 90 TABLET | Refills: 3 | Status: SHIPPED | OUTPATIENT
Start: 2024-04-18

## 2024-04-18 RX ORDER — LOSARTAN POTASSIUM 50 MG/1
50 TABLET ORAL DAILY
Qty: 90 TABLET | Refills: 3 | Status: SHIPPED | OUTPATIENT
Start: 2024-04-18

## 2024-04-18 NOTE — PROGRESS NOTES
\"Have you been to the ER, urgent care clinic since your last visit?  Hospitalized since your last visit?\"    NO    “Have you seen or consulted any other health care providers outside of [facility] since your last visit?”    NO     “Have you had a pap smear?”    NO    Chief Complaint   Patient presents with    Congestion     With cough, fever and headaches started 2 weeks ago.           No cervical cancer screening on file             Click Here for Release of Records Request  
sore throat, she reported feeling unwell yesterday. Her symptoms have been intermittent, with periods of relief lasting one to two days. She has also been experiencing intermittent fevers. He has attempted to alleviate his symptoms with Emergen-C and Mucinex, but to no avail. Her hydration status has been satisfactory, as evidenced by her consumption of Gatorade and water. Her sleep quality has been suboptimal. Her sister was ill on Easter Sunday, which prompted his sister to seek medical attention, where it was determined that her symptoms will need to run its course. The patient has decreased vaping, from two to three per week, now to 1. She has also reported an itchy tickle in his ear over the past few days, but denies any history of allergies.    The patient is seeking a refill of his Toprol prescription. He was previously advised to discontinue Toprol. She reported feeling well for a day, but experienced severe chest pain the following day, prompting her to contact two pharmacists who advised against immediate discontinuation after a 30-year use.         Objective   Blood pressure (!) 140/92, pulse 83, temperature 98.1 °F (36.7 °C), temperature source Tympanic, resp. rate 14, weight 68 kg (150 lb), SpO2 95 %.Body mass index is 26.57 kg/m².  Physical Exam  General appearance - Alert, NAD, tired appearing  Head - Atraumatic. Normocephalic. No lymphadenopathy  Eyes - EOMI. Sclera white.   Ears - Hearing grossly normal. TMs without erythema or bulge bilaterally. No cerumen impaction.   Nose - Nares patent, no polyps  Throat - moist, pharynx without erythema or exudates.   Respiratory - LCTAB. Effort normal, without respiratory distress. No wheeze/rale/rhonchi  Heart - Normal rate, regular rhythm. No m/r/r  Abdomen - Soft, non tender. Non distended.   Neurological - No gross focal deficits. Speech normal. Alert and oriented. Mental status is at baseline.   Musculoskeletal - Grossly normal ROM, Gait normal.

## 2024-04-23 ENCOUNTER — TELEPHONE (OUTPATIENT)
Facility: CLINIC | Age: 64
End: 2024-04-23

## 2024-04-23 DIAGNOSIS — T36.95XA ANTIBIOTIC-INDUCED YEAST INFECTION: Primary | ICD-10-CM

## 2024-04-23 DIAGNOSIS — B37.9 ANTIBIOTIC-INDUCED YEAST INFECTION: Primary | ICD-10-CM

## 2024-04-23 RX ORDER — FLUCONAZOLE 150 MG/1
150 TABLET ORAL
Qty: 2 TABLET | Refills: 0 | Status: SHIPPED | OUTPATIENT
Start: 2024-04-23 | End: 2024-04-29

## 2024-04-23 NOTE — TELEPHONE ENCOUNTER
Patient is requesting (New  Updated) referral to the following office:    Speciality: gyn  Specialist Name:unknown  Office Location: unknown  Phone (if available): unknown  Fax (if available): unknown  Diagnosis: Female issues  Date of appointment (if scheduled): unknown Please contact patient [phone].

## 2024-04-25 NOTE — TELEPHONE ENCOUNTER
Reached out to patient to inform her of this. Unable to get in touch with patient LMOV. Please review and advise as needed.

## (undated) DEVICE — 3M™ IOBAN™ 2 ANTIMICROBIAL INCISE DRAPE 6640EZ: Brand: IOBAN™ 2

## (undated) DEVICE — 3M™ IOBAN™ 2 ANTIMICROBIAL INCISE DRAPE 6650EZ: Brand: IOBAN™ 2

## (undated) DEVICE — X-RAY DETECTABLE SPONGES,12 PLY: Brand: VISTEC

## (undated) DEVICE — SSC BONE WAX: Brand: SSC BONE WAX

## (undated) DEVICE — SNAP KOVER: Brand: UNBRANDED

## (undated) DEVICE — STERILE POLYISOPRENE POWDER-FREE SURGICAL GLOVES: Brand: PROTEXIS

## (undated) DEVICE — CODMAN® SURGICAL PATTIES 1/2" X 3" (1.27CM X 7.62CM): Brand: CODMAN®

## (undated) DEVICE — (D)GLOVE SURG ORTH 7 PWD LTX -- DISC BY MFR USE ITEM 278013

## (undated) DEVICE — 12FR FRAZIER SUCTION HANDLE: Brand: CARDINAL HEALTH

## (undated) DEVICE — SUTURE ABSRB L30CM 2-0 VLT SPRL PDS + STRATAFIX SXPP1B410

## (undated) DEVICE — DRAPE C-ARMOUR C-ARM KIT --

## (undated) DEVICE — NEEDLE HYPO 25GA L1.5IN BVL ORIENTED ECLIPSE

## (undated) DEVICE — DISPOSABLE PEDICLE SCREW PROBE 3MM BALL; 100MM X 1.8MM ELECTRODE; 1.9M WIRE WITH DIN42802 CONNECTOR: Brand: NEUROSIGN V4

## (undated) DEVICE — SUT PROL 6-0 24IN BV1 DA BLU --

## (undated) DEVICE — CATHETER IV 10GA L3IN OD3.3-3.5MM ID2.64-2.74MM BRN FEP

## (undated) DEVICE — THE MILL DISPOSABLE - FINE

## (undated) DEVICE — DRSG PATCH ANTIMIC 1INX7.0MM -- CONVERT TO ITEM 356054

## (undated) DEVICE — INTENDED FOR TISSUE SEPARATION, AND OTHER PROCEDURES THAT REQUIRE A SHARP SURGICAL BLADE TO PUNCTURE OR CUT.: Brand: BARD-PARKER ® CARBON RIB-BACK BLADES

## (undated) DEVICE — 3M™ TEGADERM™ TRANSPARENT FILM DRESSING FRAME STYLE, 1626W, 4 IN X 4-3/4 IN (10 CM X 12 CM), 50/CT 4CT/CASE: Brand: 3M™ TEGADERM™

## (undated) DEVICE — (D)SYR 10ML SLIP TIP 1/5ML GRD -- DISC BY MFR USE ITEM 338000

## (undated) DEVICE — DRAPE TBL W72XH34IN D30IN SGL PC DISPOSABLE

## (undated) DEVICE — TOWEL SURG W16XL26IN BLU NONFENESTRATED DLX ST 2 PER PK

## (undated) DEVICE — 8FR FRAZIER SUCTION HANDLE: Brand: CARDINAL HEALTH

## (undated) DEVICE — DRAIN KT WND 10FR RND 400ML --

## (undated) DEVICE — REM POLYHESIVE ADULT PATIENT RETURN ELECTRODE: Brand: VALLEYLAB

## (undated) DEVICE — KIT CATH OD16FR 5ML BLLN SIL URIN INDWL STR TIP INF CTRL

## (undated) DEVICE — 20 ML SYRINGE REGULAR TIP: Brand: MONOJECT

## (undated) DEVICE — SUTURE VCRL SZ 0 L18IN ABSRB UD L36MM CT-1 1/2 CIR J840D

## (undated) DEVICE — KIT JACK TBL PT CARE

## (undated) DEVICE — DRAPE SHEET, X-LARGE: Brand: CONVERTORS

## (undated) DEVICE — SPINE PACK DEPAUL: Brand: MEDLINE INDUSTRIES, INC.

## (undated) DEVICE — X-RAY SPONGES,12 PLY: Brand: DERMACEA

## (undated) DEVICE — SYRINGE 30CC LUER LOCK: Brand: CARDINAL HEALTH

## (undated) DEVICE — 1010 S-DRAPE TOWEL DRAPE 10/BX: Brand: STERI-DRAPE™

## (undated) DEVICE — SUTURE VCRL SZ 2-0 L18IN ABSRB UD CT-1 L36MM 1/2 CIR J839D

## (undated) DEVICE — SOLUTION IV 1000ML 0.9% SOD CHL

## (undated) DEVICE — SUT STRATA PGA 4-0 PS-2 30CM

## (undated) DEVICE — FLOSEAL HEMOSTATIC MATRIX, 5 ML: Brand: FLOSEAL

## (undated) DEVICE — SYR 10ML CTRL LR LCK NSAF LF --

## (undated) DEVICE — INCISE SURGICAL DRAPE: Brand: OPSITE INCISE 14X25CM CTN 20

## (undated) DEVICE — COVER LT HNDL BLU PLAS

## (undated) DEVICE — SYR 5ML 1/5 GRAD LL NSAF LF --

## (undated) DEVICE — STERILE LATEX POWDER-FREE SURGICAL GLOVESWITH NITRILE COATING: Brand: PROTEXIS

## (undated) DEVICE — 10FR FRAZIER SUCTION HANDLE: Brand: CARDINAL HEALTH

## (undated) DEVICE — BIPOLAR FORCEPS CORD,BANANA LEADS: Brand: VALLEYLAB

## (undated) DEVICE — KENDALL SCD EXPRESS SLEEVES, KNEE LENGTH, MEDIUM: Brand: KENDALL SCD

## (undated) DEVICE — SPONGE LAP 18X18IN STRL -- 5/PK

## (undated) DEVICE — DURASEAL® EXACT SPINAL SEALANT SYSTEM 5ML 5 PACK: Brand: DURASEAL EXACT SPINAL SEALANT SYSTEM

## (undated) DEVICE — TOWEL: OR BLU 80/CS: Brand: MEDICAL ACTION INDUSTRIES

## (undated) DEVICE — SUTURE NRLN SZ 4-0 L18IN NONABSORBABLE BLK L13MM TF 1/2 CIR C584D

## (undated) DEVICE — SOLUTION IRRIG 1000ML H2O STRL BLT

## (undated) DEVICE — 20 ML SYRINGE LUER-LOCK TIP: Brand: MONOJECT

## (undated) DEVICE — TOOL 14MH30 LEGEND 14CM 3MM: Brand: MIDAS REX ™

## (undated) DEVICE — CODMAN® SURGICAL PATTIES 1/2" X 1/2" (1.27CM X 1.27CM): Brand: CODMAN®

## (undated) DEVICE — CATHETER IV 14GA L1.25IN ORNG POLY SFTY SYS FULL ENCASED

## (undated) DEVICE — ELECTRODE BLDE L4IN NONINSULATED EDGE

## (undated) DEVICE — PREP SKN DURAPREP 26ML APPL --

## (undated) DEVICE — SKIN CLOS DERMABND PRINEO 60CM -- DERMABOUND PRINEO

## (undated) DEVICE — THE CANADY HYBRID PLASMA SCALPEL IS AN ELECTROSURGICAL PLASMA SCALPEL THAT USES AN 85MM BENDABLE PADDLE BLADE TIP. THE ELECTROSURGICAL PLASMA SCALPEL IS USED TO SIMULTANEOUSLY CUT AND COAGULATE BIOLOGICAL TISSUE.: Brand: CANADY HYBRID PLASMA PADDLE BLADE

## (undated) DEVICE — PACKING 8004007 NEURAY 200PK 13X76MM: Brand: NEURAY ®